# Patient Record
Sex: FEMALE | Race: WHITE | Employment: FULL TIME | URBAN - METROPOLITAN AREA
[De-identification: names, ages, dates, MRNs, and addresses within clinical notes are randomized per-mention and may not be internally consistent; named-entity substitution may affect disease eponyms.]

---

## 2017-01-16 ENCOUNTER — PATIENT MESSAGE (OUTPATIENT)
Dept: FAMILY MEDICINE CLINIC | Age: 32
End: 2017-01-16

## 2017-01-17 RX ORDER — ALBUTEROL SULFATE 90 UG/1
2 AEROSOL, METERED RESPIRATORY (INHALATION) EVERY 6 HOURS PRN
Qty: 1 INHALER | Refills: 1 | Status: SHIPPED | OUTPATIENT
Start: 2017-01-17 | End: 2018-07-26 | Stop reason: ALTCHOICE

## 2017-01-18 ENCOUNTER — OFFICE VISIT (OUTPATIENT)
Dept: FAMILY MEDICINE CLINIC | Age: 32
End: 2017-01-18

## 2017-01-18 VITALS
DIASTOLIC BLOOD PRESSURE: 84 MMHG | HEIGHT: 66 IN | TEMPERATURE: 98.1 F | SYSTOLIC BLOOD PRESSURE: 112 MMHG | HEART RATE: 124 BPM | BODY MASS INDEX: 38.41 KG/M2 | OXYGEN SATURATION: 94 % | WEIGHT: 239 LBS

## 2017-01-18 DIAGNOSIS — J06.9 URI, ACUTE: Primary | ICD-10-CM

## 2017-01-18 PROCEDURE — 99213 OFFICE O/P EST LOW 20 MIN: CPT | Performed by: NURSE PRACTITIONER

## 2017-01-18 RX ORDER — BROMPHENIRAMINE MALEATE, PSEUDOEPHEDRINE HYDROCHLORIDE, AND DEXTROMETHORPHAN HYDROBROMIDE 2; 30; 10 MG/5ML; MG/5ML; MG/5ML
SYRUP ORAL
COMMUNITY
Start: 2017-01-16 | End: 2017-11-13 | Stop reason: ALTCHOICE

## 2017-01-18 RX ORDER — METHYLPREDNISOLONE 4 MG/1
TABLET ORAL
COMMUNITY
Start: 2017-01-16 | End: 2017-01-18 | Stop reason: SINTOL

## 2017-01-18 RX ORDER — FLUCONAZOLE 150 MG/1
TABLET ORAL
Qty: 2 TABLET | Refills: 0 | Status: SHIPPED | OUTPATIENT
Start: 2017-01-18 | End: 2017-01-19

## 2017-01-18 RX ORDER — AMOXICILLIN AND CLAVULANATE POTASSIUM 875; 125 MG/1; MG/1
1 TABLET, FILM COATED ORAL 2 TIMES DAILY
Qty: 20 TABLET | Refills: 0 | Status: SHIPPED | OUTPATIENT
Start: 2017-01-18 | End: 2017-01-28

## 2017-01-18 ASSESSMENT — ENCOUNTER SYMPTOMS
COUGH: 1
DIARRHEA: 0
ALLERGIC/IMMUNOLOGIC NEGATIVE: 1
NAUSEA: 0
CHEST TIGHTNESS: 1
EYES NEGATIVE: 1
SORE THROAT: 0
ABDOMINAL PAIN: 0
WHEEZING: 0
SINUS PAIN: 1
SWOLLEN GLANDS: 0
SINUS PRESSURE: 1
VOMITING: 0
GASTROINTESTINAL NEGATIVE: 1
RHINORRHEA: 1

## 2017-02-05 DIAGNOSIS — F51.04 PSYCHOPHYSIOLOGICAL INSOMNIA: Primary | ICD-10-CM

## 2017-02-05 RX ORDER — TRAZODONE HYDROCHLORIDE 50 MG/1
50 TABLET ORAL NIGHTLY
Qty: 90 TABLET | Refills: 1 | Status: SHIPPED | OUTPATIENT
Start: 2017-02-05 | End: 2017-08-10 | Stop reason: SDUPTHER

## 2017-06-19 ENCOUNTER — PATIENT MESSAGE (OUTPATIENT)
Dept: FAMILY MEDICINE CLINIC | Age: 32
End: 2017-06-19

## 2017-06-19 DIAGNOSIS — H10.33 ACUTE BACTERIAL CONJUNCTIVITIS OF BOTH EYES: Primary | ICD-10-CM

## 2017-06-19 PROCEDURE — 99444 PR PHYSICIAN ONLINE EVALUATION & MANAGEMENT SERVICE: CPT | Performed by: FAMILY MEDICINE

## 2017-06-19 RX ORDER — CIPROFLOXACIN HYDROCHLORIDE 3.5 MG/ML
1 SOLUTION/ DROPS TOPICAL
Qty: 1 BOTTLE | Refills: 0 | Status: SHIPPED | OUTPATIENT
Start: 2017-06-19 | End: 2017-06-29

## 2017-06-19 RX ORDER — CIPROFLOXACIN HYDROCHLORIDE 3.5 MG/ML
SOLUTION/ DROPS TOPICAL
Qty: 5 ML | Refills: 0 | OUTPATIENT
Start: 2017-06-19

## 2017-08-10 DIAGNOSIS — F51.04 PSYCHOPHYSIOLOGICAL INSOMNIA: ICD-10-CM

## 2017-08-10 RX ORDER — TRAZODONE HYDROCHLORIDE 50 MG/1
50 TABLET ORAL NIGHTLY
Qty: 90 TABLET | Refills: 0 | Status: SHIPPED | OUTPATIENT
Start: 2017-08-10 | End: 2017-11-13 | Stop reason: ALTCHOICE

## 2017-11-13 ENCOUNTER — OFFICE VISIT (OUTPATIENT)
Dept: FAMILY MEDICINE CLINIC | Age: 32
End: 2017-11-13

## 2017-11-13 VITALS
HEIGHT: 66 IN | DIASTOLIC BLOOD PRESSURE: 60 MMHG | HEART RATE: 125 BPM | WEIGHT: 237.4 LBS | OXYGEN SATURATION: 96 % | SYSTOLIC BLOOD PRESSURE: 110 MMHG | BODY MASS INDEX: 38.15 KG/M2 | TEMPERATURE: 99.4 F

## 2017-11-13 DIAGNOSIS — J06.9 URI, ACUTE: Primary | ICD-10-CM

## 2017-11-13 RX ORDER — NORETHINDRONE 0.35 MG/1
TABLET ORAL
COMMUNITY
Start: 2017-10-24 | End: 2018-07-26 | Stop reason: ALTCHOICE

## 2017-11-13 RX ORDER — AMOXICILLIN 500 MG/1
500 CAPSULE ORAL 3 TIMES DAILY
Qty: 30 CAPSULE | Refills: 0 | Status: SHIPPED | OUTPATIENT
Start: 2017-11-13 | End: 2017-11-23

## 2017-11-13 ASSESSMENT — ENCOUNTER SYMPTOMS
VOMITING: 0
SWOLLEN GLANDS: 0
DIARRHEA: 0
SINUS PAIN: 1
CHEST TIGHTNESS: 1
ABDOMINAL PAIN: 0
ALLERGIC/IMMUNOLOGIC NEGATIVE: 1
COUGH: 1
EYES NEGATIVE: 1
SORE THROAT: 0
GASTROINTESTINAL NEGATIVE: 1
WHEEZING: 0
RHINORRHEA: 0
NAUSEA: 0

## 2017-11-13 NOTE — PROGRESS NOTES
11/13/17    Katharina Becerra  1985      Chief Complaint   Patient presents with    URI     chest congestion, cough x 5 days       Vitals:    11/13/17 1053   BP: 110/60   Pulse: 125   Temp: 99.4 °F (37.4 °C)   SpO2: 96%         Immunization History   Administered Date(s) Administered    HPV Gardasil Quadrivalent 09/02/2010    Influenza Vaccine, unspecified formulation 11/11/2016    MMR 08/03/2016    PPD Test 06/29/2011    Tdap (Boostrix, Adacel) 05/02/2006, 07/01/2011       Allergies   Allergen Reactions    Prednisone      flushing     Outpatient Prescriptions Marked as Taking for the 11/13/17 encounter (Office Visit) with Sandra Lua, NP   Medication Sig Dispense Refill    GABBIE 0.35 MG tablet       amoxicillin (AMOXIL) 500 MG capsule Take 1 capsule by mouth 3 times daily for 10 days 30 capsule 0    albuterol sulfate HFA (PROVENTIL HFA) 108 (90 BASE) MCG/ACT inhaler Inhale 2 puffs into the lungs every 6 hours as needed for Wheezing MAY SUBSTITUTE PER INSURANCE 1 Inhaler 1    fluticasone (FLONASE) 50 MCG/ACT nasal spray 2 sprays by Nasal route daily 3 Bottle 1    cetirizine (ZYRTEC) 10 MG tablet Take by mouth      Flaxseed, Linseed, 1000 MG CAPS Take  by mouth.  fish oil-omega-3 fatty acids 1000 MG capsule Take 2 g by mouth daily.  multivitamin (THERAGRAN) per tablet Take 1 tablet by mouth daily.  vitamin D (CHOLECALCIFEROL) 400 UNIT TABS tablet Take 2,000 Units by mouth daily.            Past Medical History:   Diagnosis Date    Allergic rhinitis     Goiter, non-toxic     Hypertension      Past Surgical History:   Procedure Laterality Date    WISDOM TOOTH EXTRACTION       Family History   Problem Relation Age of Onset    High Blood Pressure Mother     High Cholesterol Mother     High Blood Pressure Father     High Cholesterol Father     Heart Disease Father     High Cholesterol Brother     Heart Disease Paternal Aunt     Stroke Paternal Uncle     Heart Disease Negative. Physical Exam   Constitutional: She is oriented to person, place, and time. She appears well-developed and well-nourished. HENT:   Head: Normocephalic. Right Ear: External ear normal.   Left Ear: External ear normal.   Mouth/Throat: Oropharynx is clear and moist. No oropharyngeal exudate. Nasal congestion and facial pain     Eyes: Conjunctivae are normal. Pupils are equal, round, and reactive to light. Neck: Normal range of motion. Neck supple. Cardiovascular: Normal rate, regular rhythm, normal heart sounds and intact distal pulses. Pulmonary/Chest: Effort normal. No respiratory distress. She has decreased breath sounds. She has no wheezes. She has no rhonchi. She has no rales. Lymphadenopathy:     She has no cervical adenopathy. Neurological: She is alert and oriented to person, place, and time. Skin: Skin is warm and dry. Psychiatric: She has a normal mood and affect. Her behavior is normal. Judgment and thought content normal.         1. URI, acute  The condition is deteriorating, will change treatment, investigate cause and make further recommendations when data back. Continue current therapy, will monitor.    amoxicillin (AMOXIL) 500 MG capsule             Micheal Kaufman NP

## 2018-01-29 ENCOUNTER — TELEPHONE (OUTPATIENT)
Dept: FAMILY MEDICINE CLINIC | Age: 33
End: 2018-01-29

## 2018-01-29 NOTE — TELEPHONE ENCOUNTER
Son was seen by the [facility] today and tested negative for the flu but mom thinks he does have the flu  Mom is 7 months pregnant and wants to know what she can do or take to avoid getting the flu  Please advise  If applicable, please send medication

## 2018-06-26 ENCOUNTER — OFFICE VISIT (OUTPATIENT)
Dept: FAMILY MEDICINE CLINIC | Age: 33
End: 2018-06-26

## 2018-06-26 VITALS
BODY MASS INDEX: 32.95 KG/M2 | DIASTOLIC BLOOD PRESSURE: 78 MMHG | SYSTOLIC BLOOD PRESSURE: 102 MMHG | TEMPERATURE: 98.7 F | HEIGHT: 66 IN | OXYGEN SATURATION: 98 % | HEART RATE: 117 BPM | WEIGHT: 205 LBS

## 2018-06-26 DIAGNOSIS — E78.5 HYPERLIPIDEMIA, UNSPECIFIED HYPERLIPIDEMIA TYPE: ICD-10-CM

## 2018-06-26 DIAGNOSIS — E55.9 VITAMIN D DEFICIENCY: ICD-10-CM

## 2018-06-26 DIAGNOSIS — E04.9 NONTOXIC NODULAR GOITER: ICD-10-CM

## 2018-06-26 DIAGNOSIS — R03.0 ELEVATED BLOOD PRESSURE READING WITHOUT DIAGNOSIS OF HYPERTENSION: ICD-10-CM

## 2018-06-26 DIAGNOSIS — Z00.00 ROUTINE GENERAL MEDICAL EXAMINATION AT A HEALTH CARE FACILITY: ICD-10-CM

## 2018-06-26 DIAGNOSIS — K64.9 HEMORRHOIDS, UNSPECIFIED HEMORRHOID TYPE: Primary | ICD-10-CM

## 2018-06-26 DIAGNOSIS — L72.9 SCALP CYST: ICD-10-CM

## 2018-06-26 DIAGNOSIS — D22.9 NUMEROUS MOLES: ICD-10-CM

## 2018-06-26 PROCEDURE — 99214 OFFICE O/P EST MOD 30 MIN: CPT | Performed by: NURSE PRACTITIONER

## 2018-06-26 RX ORDER — DOCUSATE SODIUM 100 MG/1
100 CAPSULE, LIQUID FILLED ORAL 2 TIMES DAILY
COMMUNITY
End: 2018-09-28 | Stop reason: ALTCHOICE

## 2018-06-26 ASSESSMENT — PATIENT HEALTH QUESTIONNAIRE - PHQ9
SUM OF ALL RESPONSES TO PHQ9 QUESTIONS 1 & 2: 1
SUM OF ALL RESPONSES TO PHQ QUESTIONS 1-9: 1
2. FEELING DOWN, DEPRESSED OR HOPELESS: 0
1. LITTLE INTEREST OR PLEASURE IN DOING THINGS: 1

## 2018-06-26 ASSESSMENT — ENCOUNTER SYMPTOMS: CONSTIPATION: 1

## 2018-07-06 ENCOUNTER — OFFICE VISIT (OUTPATIENT)
Dept: SURGERY | Age: 33
End: 2018-07-06

## 2018-07-06 VITALS
TEMPERATURE: 97.9 F | HEIGHT: 66 IN | HEART RATE: 74 BPM | BODY MASS INDEX: 33.14 KG/M2 | SYSTOLIC BLOOD PRESSURE: 120 MMHG | DIASTOLIC BLOOD PRESSURE: 83 MMHG | WEIGHT: 206.2 LBS

## 2018-07-06 DIAGNOSIS — E04.9 NONTOXIC NODULAR GOITER: ICD-10-CM

## 2018-07-06 DIAGNOSIS — L72.11 PILAR CYST: Primary | ICD-10-CM

## 2018-07-06 DIAGNOSIS — E55.9 VITAMIN D DEFICIENCY: ICD-10-CM

## 2018-07-06 DIAGNOSIS — E78.5 HYPERLIPIDEMIA, UNSPECIFIED HYPERLIPIDEMIA TYPE: ICD-10-CM

## 2018-07-06 DIAGNOSIS — R03.0 ELEVATED BLOOD PRESSURE READING WITHOUT DIAGNOSIS OF HYPERTENSION: ICD-10-CM

## 2018-07-06 DIAGNOSIS — Z00.00 ROUTINE GENERAL MEDICAL EXAMINATION AT A HEALTH CARE FACILITY: ICD-10-CM

## 2018-07-06 LAB
A/G RATIO: 1.3 (ref 1.1–2.2)
ALBUMIN SERPL-MCNC: 4.4 G/DL (ref 3.4–5)
ALP BLD-CCNC: 102 U/L (ref 40–129)
ALT SERPL-CCNC: 20 U/L (ref 10–40)
ANION GAP SERPL CALCULATED.3IONS-SCNC: 13 MMOL/L (ref 3–16)
AST SERPL-CCNC: 16 U/L (ref 15–37)
BASOPHILS ABSOLUTE: 0 K/UL (ref 0–0.2)
BASOPHILS RELATIVE PERCENT: 0.5 %
BILIRUB SERPL-MCNC: 0.5 MG/DL (ref 0–1)
BUN BLDV-MCNC: 12 MG/DL (ref 7–20)
CALCIUM SERPL-MCNC: 10.7 MG/DL (ref 8.3–10.6)
CHLORIDE BLD-SCNC: 100 MMOL/L (ref 99–110)
CHOLESTEROL, TOTAL: 231 MG/DL (ref 0–199)
CO2: 28 MMOL/L (ref 21–32)
CREAT SERPL-MCNC: 0.6 MG/DL (ref 0.6–1.1)
EOSINOPHILS ABSOLUTE: 0.4 K/UL (ref 0–0.6)
EOSINOPHILS RELATIVE PERCENT: 6.3 %
GFR AFRICAN AMERICAN: >60
GFR NON-AFRICAN AMERICAN: >60
GLOBULIN: 3.3 G/DL
GLUCOSE BLD-MCNC: 85 MG/DL (ref 70–99)
HCT VFR BLD CALC: 41.3 % (ref 36–48)
HDLC SERPL-MCNC: 77 MG/DL (ref 40–60)
HEMOGLOBIN: 14.2 G/DL (ref 12–16)
LDL CHOLESTEROL CALCULATED: 137 MG/DL
LYMPHOCYTES ABSOLUTE: 2.3 K/UL (ref 1–5.1)
LYMPHOCYTES RELATIVE PERCENT: 36.4 %
MCH RBC QN AUTO: 28.8 PG (ref 26–34)
MCHC RBC AUTO-ENTMCNC: 34.4 G/DL (ref 31–36)
MCV RBC AUTO: 83.9 FL (ref 80–100)
MONOCYTES ABSOLUTE: 0.4 K/UL (ref 0–1.3)
MONOCYTES RELATIVE PERCENT: 7.1 %
NEUTROPHILS ABSOLUTE: 3.1 K/UL (ref 1.7–7.7)
NEUTROPHILS RELATIVE PERCENT: 49.7 %
PDW BLD-RTO: 12.6 % (ref 12.4–15.4)
PLATELET # BLD: 298 K/UL (ref 135–450)
PMV BLD AUTO: 7.4 FL (ref 5–10.5)
POTASSIUM SERPL-SCNC: 4.9 MMOL/L (ref 3.5–5.1)
RBC # BLD: 4.92 M/UL (ref 4–5.2)
SODIUM BLD-SCNC: 141 MMOL/L (ref 136–145)
T4 FREE: 2.2 NG/DL (ref 0.9–1.8)
TOTAL PROTEIN: 7.7 G/DL (ref 6.4–8.2)
TRIGL SERPL-MCNC: 83 MG/DL (ref 0–150)
TSH SERPL DL<=0.05 MIU/L-ACNC: <0.01 UIU/ML (ref 0.27–4.2)
VITAMIN D 25-HYDROXY: 35.3 NG/ML
VLDLC SERPL CALC-MCNC: 17 MG/DL
WBC # BLD: 6.2 K/UL (ref 4–11)

## 2018-07-06 PROCEDURE — 99202 OFFICE O/P NEW SF 15 MIN: CPT | Performed by: SURGERY

## 2018-07-06 NOTE — LETTER
[employer] of [address] [place] [address] [employer]  [address] [facility]  Phone: [phone]  Fax: [phone]    Issa Galan MD        July 12, 2018     Irena Erazo MD  [address] [address]    Patient: Julio César Schneider  MR Number: [id number]  YOB: 1985  Date of Visit: 7/6/2018    Dear Dr. Jaky Daniel [facility]:    Thank you for the request for consultation for Mahin Richter to me for the evaluation of a pilar cyst. Below are the relevant portions of my assessment and plan of care. The patient has a pilar cyst of the scalp. She wishes to have this removed and as a result we will proceed under straight local in the office. If you have questions, please do not hesitate to call me. I look forward to following Ana Snow along with you.     Sincerely,        Issa Galan MD

## 2018-07-11 ENCOUNTER — PATIENT MESSAGE (OUTPATIENT)
Dept: FAMILY MEDICINE CLINIC | Age: 33
End: 2018-07-11

## 2018-07-12 ENCOUNTER — TELEPHONE (OUTPATIENT)
Dept: SURGERY | Age: 33
End: 2018-07-12

## 2018-07-12 NOTE — PROGRESS NOTES
PATIENT NAME: Richelle Giang     YOB: 1985     TODAY'S DATE: 7/12/2018    Reason for Visit:  Jesse Britt cyst    Requesting Physician:  Dr. Jojo Ramirez:              The patient is a 26 y.o. female who presents with a cyst of the scalp that has been present for some time. This has gradually increased in size and associated with slight tenderness. Chief Complaint   Patient presents with    Cyst     scalp cyst-referred by Dr Chantel Florence:  CONSTITUTIONAL:  negative  HEENT:  negative  RESPIRATORY:  negative  CARDIOVASCULAR:  negative  GASTROINTESTINAL:  negative  GENITOURINARY:  negative  HEMATOLOGIC/LYMPHATIC:  negative  NEUROLOGICAL:  negative    PHYSICAL EXAM:  VITALS:  /83 (Site: Left Arm)   Pulse 74   Temp 97.9 °F (36.6 °C) (Oral)   Ht 5' 6\" (1.676 m)   Wt 206 lb 3.2 oz (93.5 kg)   LMP 06/30/2017   BMI 33.28 kg/m²     CONSTITUTIONAL:  alert, no apparent distress and mildly obese  EYES:  sclera clear  ENT:  normocepalic, without obvious abnormality  NECK:  supple, symmetrical, trachea midline and no carotid bruits  LUNGS:  clear to auscultation  CARDIOVASCULAR:  regular rate and rhythm and no murmur noted  ABDOMEN:  no scars, normal bowel sounds, soft, non-distended, non-tender, voluntary guarding absent, no masses palpated and hernia absent  MUSCULOSKELETAL:  0+ pitting edema lower extremities  NEUROLOGIC:  Mental Status Exam:  Level of Alertness:   awake  Orientation:   person, place, time  SKIN:  1 cm pilar cyst without evidence of infection    IMPRESSION/RECOMMENDATIONS:    The patient has a pilar cyst of the scalp. She wishes to have this removed and as a result we will proceed under straight local in the office. The risks and benefits of surgery were discussed with the patient and she wishes to proceed.     Romario Nazario

## 2018-07-18 ENCOUNTER — PROCEDURE VISIT (OUTPATIENT)
Dept: SURGERY | Age: 33
End: 2018-07-18

## 2018-07-18 VITALS
WEIGHT: 207 LBS | HEIGHT: 66 IN | TEMPERATURE: 98 F | BODY MASS INDEX: 33.27 KG/M2 | SYSTOLIC BLOOD PRESSURE: 106 MMHG | DIASTOLIC BLOOD PRESSURE: 74 MMHG

## 2018-07-18 DIAGNOSIS — L72.11 PILAR CYST: Primary | ICD-10-CM

## 2018-07-18 PROCEDURE — 11421 EXC H-F-NK-SP B9+MARG 0.6-1: CPT | Performed by: SURGERY

## 2018-07-18 PROCEDURE — 11422 EXC H-F-NK-SP B9+MARG 1.1-2: CPT | Performed by: SURGERY

## 2018-07-23 ENCOUNTER — OFFICE VISIT (OUTPATIENT)
Dept: SURGERY | Age: 33
End: 2018-07-23

## 2018-07-23 VITALS — WEIGHT: 207 LBS | TEMPERATURE: 98.1 F | HEIGHT: 66 IN | BODY MASS INDEX: 33.27 KG/M2

## 2018-07-23 DIAGNOSIS — L72.11 PILAR CYST: Primary | ICD-10-CM

## 2018-07-23 PROCEDURE — 99024 POSTOP FOLLOW-UP VISIT: CPT | Performed by: SURGERY

## 2018-07-24 PROBLEM — O24.410 GDM (GESTATIONAL DIABETES MELLITUS), CLASS A1: Status: ACTIVE | Noted: 2018-01-10

## 2018-07-24 PROBLEM — Z34.90 PREGNANCY: Status: ACTIVE | Noted: 2018-03-26

## 2018-07-26 ENCOUNTER — OFFICE VISIT (OUTPATIENT)
Dept: FAMILY MEDICINE CLINIC | Age: 33
End: 2018-07-26

## 2018-07-26 VITALS
HEART RATE: 74 BPM | DIASTOLIC BLOOD PRESSURE: 70 MMHG | HEIGHT: 66 IN | RESPIRATION RATE: 14 BRPM | OXYGEN SATURATION: 96 % | BODY MASS INDEX: 33.75 KG/M2 | SYSTOLIC BLOOD PRESSURE: 104 MMHG | WEIGHT: 210 LBS

## 2018-07-26 DIAGNOSIS — E04.9 NONTOXIC NODULAR GOITER: Primary | ICD-10-CM

## 2018-07-26 PROCEDURE — 99213 OFFICE O/P EST LOW 20 MIN: CPT | Performed by: FAMILY MEDICINE

## 2018-07-26 NOTE — PROGRESS NOTES
Final    Total Protein 07/06/2018 7.7  6.4 - 8.2 g/dL Final    Alb 07/06/2018 4.4  3.4 - 5.0 g/dL Final    Albumin/Globulin Ratio 07/06/2018 1.3  1.1 - 2.2 Final    Total Bilirubin 07/06/2018 0.5  0.0 - 1.0 mg/dL Final    Alkaline Phosphatase 07/06/2018 102  40 - 129 U/L Final    ALT 07/06/2018 20  10 - 40 U/L Final    AST 07/06/2018 16  15 - 37 U/L Final    Globulin 07/06/2018 3.3  g/dL Final    WBC 07/06/2018 6.2  4.0 - 11.0 K/uL Final    RBC 07/06/2018 4.92  4.00 - 5.20 M/uL Final    Hemoglobin 07/06/2018 14.2  12.0 - 16.0 g/dL Final    Hematocrit 07/06/2018 41.3  36.0 - 48.0 % Final    MCV 07/06/2018 83.9  80.0 - 100.0 fL Final    MCH 07/06/2018 28.8  26.0 - 34.0 pg Final    MCHC 07/06/2018 34.4  31.0 - 36.0 g/dL Final    RDW 07/06/2018 12.6  12.4 - 15.4 % Final    Platelets 01/96/7294 298  135 - 450 K/uL Final    MPV 07/06/2018 7.4  5.0 - 10.5 fL Final    Neutrophils % 07/06/2018 49.7  % Final    Lymphocytes % 07/06/2018 36.4  % Final    Monocytes % 07/06/2018 7.1  % Final    Eosinophils % 07/06/2018 6.3  % Final    Basophils % 07/06/2018 0.5  % Final    Neutrophils # 07/06/2018 3.1  1.7 - 7.7 K/uL Final    Lymphocytes # 07/06/2018 2.3  1.0 - 5.1 K/uL Final    Monocytes # 07/06/2018 0.4  0.0 - 1.3 K/uL Final    Eosinophils # 07/06/2018 0.4  0.0 - 0.6 K/uL Final    Basophils # 07/06/2018 0.0  0.0 - 0.2 K/uL Final    Cholesterol, Total 07/06/2018 231* 0 - 199 mg/dL Final    Triglycerides 07/06/2018 83  0 - 150 mg/dL Final    HDL 07/06/2018 77* 40 - 60 mg/dL Final    LDL Calculated 07/06/2018 137* <100 mg/dL Final    VLDL Cholesterol Calculated 07/06/2018 17  Not Established mg/dL Final    TSH 07/06/2018 <0.01* 0.27 - 4.20 uIU/mL Final    Vit D, 25-Hydroxy 07/06/2018 35.3  >=30 ng/mL Final    Comment: <=20 ng/mL. ........... Clerance Riis Deficient  21-29 ng/mL. ......... Clerance Riis Insufficient  >=30 ng/mL. ........ Clerance Riis Sufficient      T4 Free 07/06/2018 2.2* 0.9 - 1.8 ng/dL Final       Review of Systems

## 2018-07-31 NOTE — PROGRESS NOTES
PATIENT NAME: Amna Barone     YOB: 1985     TODAY'S DATE: 7/31/2018    Reason for Visit:  Post-op check    Requesting Physician:  Dr. Raguel Felty:              The patient is a 26 y.o. female who presents for follow up without complaints. Chief Complaint   Patient presents with    Post-Op Check     Excision of pilar cyst of scalp in office 7/18/18       REVIEW OF SYSTEMS:  CONSTITUTIONAL:  negative  HEENT:  negative  RESPIRATORY:  negative  CARDIOVASCULAR:  negative  GASTROINTESTINAL:  negative  GENITOURINARY:  negative  HEMATOLOGIC/LYMPHATIC:  negative  NEUROLOGICAL:  negative    PHYSICAL EXAM:  VITALS:  Temp 98.1 °F (36.7 °C)   Ht 5' 6\" (1.676 m)   Wt 207 lb (93.9 kg)   LMP 06/30/2017   BMI 33.41 kg/m²     CONSTITUTIONAL:  alert, no apparent distress and mildly obese  EYES:  sclera clear  ENT:  normocepalic, without obvious abnormality  NECK:  supple, symmetrical, trachea midline and no carotid bruits  LUNGS:  clear to auscultation  CARDIOVASCULAR:  regular rate and rhythm and no murmur noted  ABDOMEN:  no scars, normal bowel sounds, soft, non-distended, non-tender, voluntary guarding absent, no masses palpated and hernia absent  MUSCULOSKELETAL:  0+ pitting edema lower extremities  NEUROLOGIC:  Mental Status Exam:  Level of Alertness:   awake  Orientation:   person, place, time  SKIN:  Incision healing well and the sutures were removed    IMPRESSION/RECOMMENDATIONS:    The patient is s/p excision of a pilar cyst of the scalp. The patient is recovered well from surgery and his incision is healing well. I will see her back in the office on a PRN basis.      Romario Nazario

## 2018-08-20 ENCOUNTER — OFFICE VISIT (OUTPATIENT)
Dept: ENDOCRINOLOGY | Age: 33
End: 2018-08-20

## 2018-08-20 VITALS
HEIGHT: 66 IN | DIASTOLIC BLOOD PRESSURE: 78 MMHG | BODY MASS INDEX: 34.36 KG/M2 | WEIGHT: 213.8 LBS | HEART RATE: 84 BPM | SYSTOLIC BLOOD PRESSURE: 118 MMHG | OXYGEN SATURATION: 97 %

## 2018-08-20 DIAGNOSIS — E66.9 CLASS 1 OBESITY WITH BODY MASS INDEX (BMI) OF 34.0 TO 34.9 IN ADULT, UNSPECIFIED OBESITY TYPE, UNSPECIFIED WHETHER SERIOUS COMORBIDITY PRESENT: ICD-10-CM

## 2018-08-20 DIAGNOSIS — E05.90 HYPERTHYROIDISM: ICD-10-CM

## 2018-08-20 DIAGNOSIS — E04.2 MULTINODULAR GOITER: Primary | ICD-10-CM

## 2018-08-20 PROBLEM — E66.811 CLASS 1 OBESITY IN ADULT: Status: ACTIVE | Noted: 2018-08-20

## 2018-08-20 PROBLEM — Z34.90 PREGNANCY: Status: RESOLVED | Noted: 2018-03-26 | Resolved: 2018-08-20

## 2018-08-20 PROCEDURE — 99204 OFFICE O/P NEW MOD 45 MIN: CPT | Performed by: INTERNAL MEDICINE

## 2018-08-20 ASSESSMENT — PATIENT HEALTH QUESTIONNAIRE - PHQ9
2. FEELING DOWN, DEPRESSED OR HOPELESS: 0
SUM OF ALL RESPONSES TO PHQ QUESTIONS 1-9: 0
1. LITTLE INTEREST OR PLEASURE IN DOING THINGS: 0
SUM OF ALL RESPONSES TO PHQ QUESTIONS 1-9: 0
SUM OF ALL RESPONSES TO PHQ9 QUESTIONS 1 & 2: 0

## 2018-08-20 NOTE — PROGRESS NOTES
SUBJECTIVE:  Ferd Lombard is a 26 y.o. female who is here for a hyperthyroidism, multinodular goiter. 1. Multinodular goiter    This started in 2001. Patient was diagnosed with MNG. The problem has been unchanged. Previous thyroid studies include: TSH and free thyroxine. Current complaints: weight gain, anxiety, insomnia, fatigue. Had a lot of stress, father, brother passed away recently. In 2001 she was diagnosed with 1.5 cm cystic nodule. Repeated US - right 1.4 cm, right superior 1.7 cm, left subcm nodules. History of obstructive symptoms: difficulty swallowing No, changes in voice/hoarseness No.    History of radiation to patient's neck: No  Resent iodine exposure: No  Family history includes thyroid abnormalities. Family history of thyroid cancer: No    2. Hyperthyroidism    No diarrhea. Hair loss postpartum. Calcium 10.6, ULN 10.5. Had son born 3/28/2018. Has another boy 5yo  Has breastfeeding. Has Mary implant for birth control. Had short periods since 3/2018. 3. Class 1 obesity with body mass index (BMI) of 34.0 to 34.9 in adult, unspecified obesity type, unspecified whether serious comorbidity present  Gained 10 lbs recently. Eats moderately healthy.           Past Medical History:   Diagnosis Date    Allergic rhinitis     Class 1 obesity in adult 8/20/2018    Goiter, non-toxic     Hypertension     Hyperthyroidism 8/20/2018     Patient Active Problem List    Diagnosis Date Noted    Hyperthyroidism 08/20/2018    Class 1 obesity in adult 08/20/2018    GDM (gestational diabetes mellitus), class A1 01/10/2018    Psychophysiological insomnia 02/05/2017    Allergic rhinitis 07/20/2016    Routine general medical examination at a health care facility 05/02/2011    Multinodular goiter 05/02/2011    Hyperlipemia 05/02/2011    Vitamin D deficiency 05/02/2011    Anxiety 05/02/2011    Elevated blood pressure reading without diagnosis of hypertension 05/02/2011     Past Surgical History:   Procedure Laterality Date    CYST REMOVAL  2018    Scalp    WISDOM TOOTH EXTRACTION       Family History   Problem Relation Age of Onset    High Blood Pressure Mother     High Cholesterol Mother     High Blood Pressure Father     High Cholesterol Father     Heart Disease Father     High Cholesterol Brother     Heart Disease Paternal Aunt     Stroke Paternal Uncle     Heart Disease Paternal Uncle     Stroke Maternal Grandmother     Arthritis Paternal Grandmother     Heart Disease Paternal Grandfather      Social History     Social History    Marital status:      Spouse name: N/A    Number of children: N/A    Years of education: N/A     Social History Main Topics    Smoking status: Never Smoker    Smokeless tobacco: Never Used    Alcohol use Yes      Comment: 1-2 per month on average    Drug use: No    Sexual activity: Yes     Other Topics Concern    None     Social History Narrative    None     Current Outpatient Prescriptions   Medication Sig Dispense Refill    docusate sodium (COLACE) 100 MG capsule Take 100 mg by mouth 2 times daily      fluticasone (FLONASE) 50 MCG/ACT nasal spray 2 sprays by Nasal route daily 3 Bottle 1    cetirizine (ZYRTEC) 10 MG tablet Take by mouth      Flaxseed, Linseed, 1000 MG CAPS Take  by mouth.  fish oil-omega-3 fatty acids 1000 MG capsule Take 1,600 mg by mouth daily       multivitamin (THERAGRAN) per tablet Take 1 tablet by mouth daily.  Cholecalciferol (VITAMIN D) 2000 units CAPS capsule Take 2,000 Units by mouth daily. No current facility-administered medications for this visit.       Allergies   Allergen Reactions    Prednisone      flushing     Family Status   Relation Status    Mother Alive    Father     Brother Alive    PAunt Alive        one  from sudden death   [facility]     MGM     [address] [place] [address] Alive    [facility]     MGF Alive        thyroid, gout    Brother 07/06/2018    GFRAA 98 10/22/2011    AGRATIO 1.3 07/06/2018    GLOB 3.3 07/06/2018     Lab Results   Component Value Date    TSH <0.01 07/06/2018     No results found for: LABA1C  No results found for: EAG  Lab Results   Component Value Date    CHOL 231 07/06/2018     Lab Results   Component Value Date    TRIG 83 07/06/2018     Lab Results   Component Value Date    HDL 77 07/06/2018    HDL 51 03/14/2012     Lab Results   Component Value Date    LDLCALC 137 07/06/2018     Lab Results   Component Value Date    LABVLDL 17 07/06/2018     No results found for: CHOLHDLRATIO  No results found for: Jarret Sherwood  Lab Results   Component Value Date    VITD25 35.3 07/06/2018        ASSESSMENT/PLAN:  1. Hyperthyroidism    - Comprehensive Metabolic Panel; Future  - T3; Future  - T3, Free; Future  - T4; Future  - T4, Free; Future  - TSH without Reflex; Future  - Thyroid Stimulating Immunoglobulin; Future  - Thyroid Peroxidase Antibody; Future  - Anti-Thyroglobulin Antibody; Future  - Thyrotropin receptor antibody; Future  - CBC Auto Differential; Future    2. Class 1 obesity with body mass index (BMI) of 34.0 to 34.9 in adult, unspecified obesity type, unspecified whether serious comorbidity present  Diet, exercise. 3. Multinodular goiter  Follow sonogram with Dr. Vickey Springer. Reviewed and/or ordered clinical lab results Yes  Reviewed and/or ordered radiology tests Yes   Reviewed and/or ordered other diagnostic tests No  Discussed test results with performing physician No  Independently reviewed image, tracing, or specimen No  Made a decision to obtain old records No  Reviewed and summarized old records Yes  In 2001 she was diagnosed with 1.5 cm cystic nodule. Repeated US - right 1.4 cm, right superior 1.7 cm, left subcm nodules.   TSH 7/21/2018 <0.01  TSH 7/2016 2.36  Obtained history from other than patient No    Janet Reese was counseled regarding symptoms of thyroid diagnosis, course and complications of disease if inadequately treated, side effects of medications, diagnosis, treatment options, and prognosis, risks, benefits, complications, and alternatives of treatment, labs, imaging and other studies and treatment targets and goals, thyorid nodules, hyperthyroidism causes treatment. .  She understands instructions and counseling. Total visit time 45 min, >50% was counseling time. Return in about 1 month (around 9/20/2018) for thyroid problems.

## 2018-08-24 DIAGNOSIS — E05.90 HYPERTHYROIDISM: ICD-10-CM

## 2018-08-25 LAB
A/G RATIO: 1.7 (ref 1.1–2.2)
ALBUMIN SERPL-MCNC: 4.7 G/DL (ref 3.4–5)
ALP BLD-CCNC: 91 U/L (ref 40–129)
ALT SERPL-CCNC: 17 U/L (ref 10–40)
ANION GAP SERPL CALCULATED.3IONS-SCNC: 14 MMOL/L (ref 3–16)
ANTI-THYROGLOB ABS: 187 IU/ML
AST SERPL-CCNC: 18 U/L (ref 15–37)
BASOPHILS ABSOLUTE: 0.1 K/UL (ref 0–0.2)
BASOPHILS RELATIVE PERCENT: 1.3 %
BILIRUB SERPL-MCNC: 0.4 MG/DL (ref 0–1)
BUN BLDV-MCNC: 11 MG/DL (ref 7–20)
CALCIUM SERPL-MCNC: 10 MG/DL (ref 8.3–10.6)
CHLORIDE BLD-SCNC: 101 MMOL/L (ref 99–110)
CO2: 27 MMOL/L (ref 21–32)
CREAT SERPL-MCNC: 0.7 MG/DL (ref 0.6–1.1)
EOSINOPHILS ABSOLUTE: 0.3 K/UL (ref 0–0.6)
EOSINOPHILS RELATIVE PERCENT: 5.2 %
GFR AFRICAN AMERICAN: >60
GFR NON-AFRICAN AMERICAN: >60
GLOBULIN: 2.8 G/DL
GLUCOSE BLD-MCNC: 77 MG/DL (ref 70–99)
HCT VFR BLD CALC: 42.6 % (ref 36–48)
HEMOGLOBIN: 14 G/DL (ref 12–16)
LYMPHOCYTES ABSOLUTE: 2.3 K/UL (ref 1–5.1)
LYMPHOCYTES RELATIVE PERCENT: 40.4 %
MCH RBC QN AUTO: 28.7 PG (ref 26–34)
MCHC RBC AUTO-ENTMCNC: 32.8 G/DL (ref 31–36)
MCV RBC AUTO: 87.7 FL (ref 80–100)
MONOCYTES ABSOLUTE: 0.3 K/UL (ref 0–1.3)
MONOCYTES RELATIVE PERCENT: 5.2 %
NEUTROPHILS ABSOLUTE: 2.8 K/UL (ref 1.7–7.7)
NEUTROPHILS RELATIVE PERCENT: 47.9 %
PDW BLD-RTO: 14.1 % (ref 12.4–15.4)
PLATELET # BLD: 273 K/UL (ref 135–450)
PMV BLD AUTO: 7.1 FL (ref 5–10.5)
POTASSIUM SERPL-SCNC: 4.6 MMOL/L (ref 3.5–5.1)
RBC # BLD: 4.86 M/UL (ref 4–5.2)
SODIUM BLD-SCNC: 142 MMOL/L (ref 136–145)
T3 FREE: 2.6 PG/ML (ref 2.3–4.2)
T3 TOTAL: 1.04 NG/ML (ref 0.8–2)
T4 FREE: 0.8 NG/DL (ref 0.9–1.8)
T4 TOTAL: 5.5 UG/DL (ref 4.5–10.9)
THYROID PEROXIDASE (TPO) ABS: 568 IU/ML
TOTAL PROTEIN: 7.5 G/DL (ref 6.4–8.2)
TSH SERPL DL<=0.05 MIU/L-ACNC: 6.12 UIU/ML (ref 0.27–4.2)
WBC # BLD: 5.8 K/UL (ref 4–11)

## 2018-08-26 ENCOUNTER — TELEPHONE (OUTPATIENT)
Dept: ENDOCRINOLOGY | Age: 33
End: 2018-08-26

## 2018-08-26 PROBLEM — E06.9 THYROIDITIS: Status: ACTIVE | Noted: 2018-08-26

## 2018-08-26 PROBLEM — E06.3 HASHIMOTO'S THYROIDITIS: Status: ACTIVE | Noted: 2018-08-26

## 2018-08-26 PROBLEM — E03.9 ACQUIRED HYPOTHYROIDISM: Status: ACTIVE | Noted: 2018-08-26

## 2018-08-27 LAB — TSH RECEPTOR AB: <0.9 IU/L

## 2018-08-29 LAB — THYROID STIMULATING IMMUNOGLOBULIN: 94 %

## 2018-09-06 ENCOUNTER — OFFICE VISIT (OUTPATIENT)
Dept: ENDOCRINOLOGY | Age: 33
End: 2018-09-06

## 2018-09-06 VITALS
SYSTOLIC BLOOD PRESSURE: 100 MMHG | HEART RATE: 90 BPM | DIASTOLIC BLOOD PRESSURE: 68 MMHG | BODY MASS INDEX: 33.52 KG/M2 | HEIGHT: 66 IN | OXYGEN SATURATION: 98 % | WEIGHT: 208.6 LBS

## 2018-09-06 DIAGNOSIS — E04.2 MULTINODULAR GOITER: Primary | ICD-10-CM

## 2018-09-06 DIAGNOSIS — E06.3 HASHIMOTO'S THYROIDITIS: ICD-10-CM

## 2018-09-06 DIAGNOSIS — E03.9 ACQUIRED HYPOTHYROIDISM: ICD-10-CM

## 2018-09-06 DIAGNOSIS — E66.9 CLASS 1 OBESITY WITH BODY MASS INDEX (BMI) OF 33.0 TO 33.9 IN ADULT, UNSPECIFIED OBESITY TYPE, UNSPECIFIED WHETHER SERIOUS COMORBIDITY PRESENT: ICD-10-CM

## 2018-09-06 DIAGNOSIS — E55.9 VITAMIN D DEFICIENCY: ICD-10-CM

## 2018-09-06 DIAGNOSIS — Z86.39 HISTORY OF HYPERTHYROIDISM: ICD-10-CM

## 2018-09-06 PROCEDURE — 99213 OFFICE O/P EST LOW 20 MIN: CPT | Performed by: INTERNAL MEDICINE

## 2018-09-06 ASSESSMENT — PATIENT HEALTH QUESTIONNAIRE - PHQ9
SUM OF ALL RESPONSES TO PHQ9 QUESTIONS 1 & 2: 0
1. LITTLE INTEREST OR PLEASURE IN DOING THINGS: 0
SUM OF ALL RESPONSES TO PHQ QUESTIONS 1-9: 0
2. FEELING DOWN, DEPRESSED OR HOPELESS: 0
SUM OF ALL RESPONSES TO PHQ QUESTIONS 1-9: 0

## 2018-09-06 NOTE — PROGRESS NOTES
SUBJECTIVE:  Ruy Koo is a 26 y.o. female who is here for a hyperthyroidism, multinodular goiter. 1. Multinodular goiter    This started in . Patient was diagnosed with MNG. The problem has been unchanged. Previous thyroid studies include: TSH and free thyroxine. Current complaints: weight gain, anxiety, insomnia, fatigue. Had a lot of stress, father, brother passed away recently. In  she was diagnosed with 1.5 cm cystic nodule. Repeated US - right 1.4 cm, right superior 1.7 cm, left subcm nodules. History of obstructive symptoms: difficulty swallowing No, changes in voice/hoarseness No.    History of radiation to patient's neck: No  Resent iodine exposure: No  Family history includes thyroid abnormalities. Family history of thyroid cancer: No    2. History of Hyperthyroidism    No diarrhea. Hair loss postpartum. Calcium 10.6, ULN 10.5. Had son born 3/28/2018. Has another boy 3yo  Has breastfeeding. Has Mary implant for birth control. Had short periods since 3/2018. 3. Class 1 obesity with body mass index (BMI) of 33.0 to 33.9 in adult, unspecified obesity type, unspecified whether serious comorbidity present  Gained 10 lbs recently. Eats moderately healthy. 4. Hypothyroidism  New diagnosis. 5. Hashimoto's thyroiditis  Has fatigue. 6. Postpartum thyroiditis    Name: Gabriel Garcia   :  1985   [address] [place] [address]. Staff: Sonia Briseno [id number]    Verified By: Rahat SANZ         Samm: 09  11:51 am  Exams:  US-THYROID/NECK/HEAD      Thyroid ultrasound on 2009    History: Thyroid nodule    Comparison: None    Findings: The right lobe of the gland measures greater than 5 cm in  craniocaudal dimension. It extends beyond the confines of the  transducer. Transversely, it measures 2.4 x 2.5 cm. The left  lobe measures 4.8 x 2.4 x 1.6 cm.  The thyroid demonstrates  diffusely heterogeneous echogenicity. There is coarsening of the  echotexture. Within the right lobe, there is a 1.3 x 0.7 x 0.9  cm cyst. There is also a hypoechoic nodule measuring 1.2 x 0.9 x  0.7 cm. The remainder of the gland is diffusely heterogeneous  and may contain additional small hypoechoic nodules. Impression:    Diffusely heterogeneous thyroid gland. There is a discrete cyst  and a discrete nodule seen within the right lobe as described  above.   * end of result *   Status         Past Medical History:   Diagnosis Date    Acquired hypothyroidism 8/26/2018    Allergic rhinitis     Class 1 obesity in adult 8/20/2018    Goiter, non-toxic     Hypertension     Hyperthyroidism 8/20/2018     Patient Active Problem List    Diagnosis Date Noted    Postpartum thyroiditis 08/26/2018    Hashimoto's thyroiditis 08/26/2018    Acquired hypothyroidism 08/26/2018    Hyperthyroidism 08/20/2018    Class 1 obesity in adult 08/20/2018    GDM (gestational diabetes mellitus), class A1 01/10/2018    Psychophysiological insomnia 02/05/2017    Allergic rhinitis 07/20/2016    Routine general medical examination at a health care facility 05/02/2011    Multinodular goiter 05/02/2011    Hyperlipemia 05/02/2011    Vitamin D deficiency 05/02/2011    Anxiety 05/02/2011    Elevated blood pressure reading without diagnosis of hypertension 05/02/2011     Past Surgical History:   Procedure Laterality Date    CYST REMOVAL  07/18/2018    Scalp    WISDOM TOOTH EXTRACTION       Family History   Problem Relation Age of Onset    High Blood Pressure Mother     High Cholesterol Mother     High Blood Pressure Father     High Cholesterol Father     Heart Disease Father     High Cholesterol Brother     Heart Disease Paternal Aunt     Stroke Paternal Uncle     Heart Disease Paternal Uncle     Stroke Maternal Grandmother     Arthritis Paternal Grandmother     Heart Disease Paternal Grandfather      Social History     Social History    no lower extremity edema, no orthopnea, no intermittent leg claudication, no palpitations  Gastrointestinal: no abdominal pain, no nausea, no vomiting, no diarrhea, no constipation, no dysphagia, no heartburn, no bloating  Genitourinary: no dysuria, no urinary incontinence, no urinary hesitancy, no urinary frequency, no feelings of urinary urgency, no nocturia  Musculoskeletal: no joint swelling, no joint stiffness, no joint pain, no muscle cramps, no muscle pain, no bone pain  Integument/Breast: no hair loss, no skin rashes, no skin lesions, no itching, no dry skin  Neurological: no numbness, no tingling, no weakness, no confusion, no headaches, no dizziness, no fainting, no tremors, no decrease in memory, no balance problems  Psychiatric: has anxiety, no depression, has insomnia  Hematologic/Lymphatic: no tendency for easy bleeding, no swollen lymph nodes, no tendency for easy bruising  Immunology: has seasonal allergies, no frequent infections, no frequent illnesses  Endocrine: no temperature intolerance    /68 (Site: Left Arm, Position: Sitting, Cuff Size: Large Adult)   Pulse 90   Ht 5' 6\" (1.676 m)   Wt 208 lb 9.6 oz (94.6 kg)   SpO2 98%   BMI 33.67 kg/m²    Wt Readings from Last 3 Encounters:   09/06/18 208 lb 9.6 oz (94.6 kg)   08/20/18 213 lb 12.8 oz (97 kg)   07/26/18 210 lb (95.3 kg)     Body mass index is 33.67 kg/m².     OBJECTIVE:  Constitutional: no acute distress, well appearing and well nourished  Psychiatric: oriented to person, place and time, judgement and insight and normal, recent and remote memory and intact and mood and affect are normal  Skin: skin and subcutaneous tissue is normal without mass, normal turgor  Head and Face: examination of head and face revealed no abnormalities  Eyes: no lid or conjunctival swelling, erythema or discharge, pupils are normal, equal, round, reactive to light  Ears/Nose: external inspection of ears and nose revealed no abnormalities, hearing is grossly normal  Oropharynx/Mouth/Face: lips, tongue and gums are normal with no lesions, the voice quality was normal  Neck: neck is supple and symmetric, with midline trachea and no masses, thyroid is enlarged  Lymphatics: normal cervical lymph nodes, normal supraclavicular nodes  Pulmonary: no increased work of breathing or signs of respiratory distress, lungs are clear to auscultation  Cardiovascular: normal heart rate and rhythm, normal S1 and S2, no murmurs and pedal pulses and 2+ bilaterally, No edema  Abdomen: abdomen is soft, non-tender with no masses  Musculoskeletal: normal gait and station and exam of the digits and nails are normal  Neurological: normal coordination and normal general cortical function      Lab Review:    Lab Results   Component Value Date    WBC 5.8 08/24/2018    HGB 14.0 08/24/2018    HCT 42.6 08/24/2018    MCV 87.7 08/24/2018     08/24/2018     Lab Results   Component Value Date     08/24/2018    K 4.6 08/24/2018     08/24/2018    CO2 27 08/24/2018    BUN 11 08/24/2018    CREATININE 0.7 08/24/2018    GLUCOSE 77 08/24/2018    GLUCOSE 83 10/22/2011    CALCIUM 10.0 08/24/2018    PROT 7.5 08/24/2018    PROT 7.1 10/22/2011    LABALBU 4.7 08/24/2018    BILITOT 0.4 08/24/2018    ALKPHOS 91 08/24/2018    AST 18 08/24/2018    ALT 17 08/24/2018    LABGLOM >60 08/24/2018    GFRAA >60 08/24/2018    GFRAA 98 10/22/2011    AGRATIO 1.7 08/24/2018    GLOB 2.8 08/24/2018     Lab Results   Component Value Date    TSH 6.12 08/24/2018    FT3 2.6 08/24/2018     No results found for: LABA1C  No results found for: EAG  Lab Results   Component Value Date    CHOL 231 07/06/2018     Lab Results   Component Value Date    TRIG 83 07/06/2018     Lab Results   Component Value Date    HDL 77 07/06/2018    HDL 51 03/14/2012     Lab Results   Component Value Date    LDLCALC 137 07/06/2018     Lab Results   Component Value Date    LABVLDL 17 07/06/2018     No results found for: CHOLHDLRATIO  No

## 2018-09-28 ENCOUNTER — OFFICE VISIT (OUTPATIENT)
Dept: FAMILY MEDICINE CLINIC | Age: 33
End: 2018-09-28
Payer: COMMERCIAL

## 2018-09-28 VITALS
HEIGHT: 66 IN | WEIGHT: 210 LBS | BODY MASS INDEX: 33.75 KG/M2 | RESPIRATION RATE: 14 BRPM | SYSTOLIC BLOOD PRESSURE: 116 MMHG | DIASTOLIC BLOOD PRESSURE: 66 MMHG | HEART RATE: 72 BPM | OXYGEN SATURATION: 96 %

## 2018-09-28 DIAGNOSIS — Z00.00 ANNUAL PHYSICAL EXAM: Primary | ICD-10-CM

## 2018-09-28 PROCEDURE — 99395 PREV VISIT EST AGE 18-39: CPT | Performed by: FAMILY MEDICINE

## 2018-09-28 ASSESSMENT — ENCOUNTER SYMPTOMS
ABDOMINAL PAIN: 0
EYE PAIN: 0
DIARRHEA: 0
SORE THROAT: 0
SHORTNESS OF BREATH: 0
CONSTIPATION: 0
EYE ITCHING: 0
COUGH: 0
APNEA: 0
COLOR CHANGE: 0
BACK PAIN: 0
VOMITING: 0
RHINORRHEA: 0

## 2018-09-28 ASSESSMENT — PATIENT HEALTH QUESTIONNAIRE - PHQ9
SUM OF ALL RESPONSES TO PHQ QUESTIONS 1-9: 0
1. LITTLE INTEREST OR PLEASURE IN DOING THINGS: 0
SUM OF ALL RESPONSES TO PHQ9 QUESTIONS 1 & 2: 0
2. FEELING DOWN, DEPRESSED OR HOPELESS: 0
SUM OF ALL RESPONSES TO PHQ QUESTIONS 1-9: 0

## 2018-09-28 NOTE — PROGRESS NOTES
(ZYRTEC) 10 MG tablet Take by mouth      Flaxseed, Linseed, 1000 MG CAPS Take  by mouth.  fish oil-omega-3 fatty acids 1000 MG capsule Take 1,600 mg by mouth daily       multivitamin (THERAGRAN) per tablet Take 1 tablet by mouth daily.  Cholecalciferol (VITAMIN D) 2000 units CAPS capsule Take 2,000 Units by mouth daily. Past Medical History:   Diagnosis Date    Acquired hypothyroidism 8/26/2018    Allergic rhinitis     Class 1 obesity in adult 8/20/2018    Goiter, non-toxic     Hypertension     Hyperthyroidism 8/20/2018     Past Surgical History:   Procedure Laterality Date    CYST REMOVAL  07/18/2018    Scalp    WISDOM TOOTH EXTRACTION       Family History   Problem Relation Age of Onset    High Blood Pressure Mother     High Cholesterol Mother     High Blood Pressure Father     High Cholesterol Father     Heart Disease Father     High Cholesterol Brother     Heart Disease Paternal Aunt     Stroke Paternal Uncle     Heart Disease Paternal Uncle     Stroke Maternal Grandmother     Arthritis Paternal Grandmother     Heart Disease Paternal Grandfather      Social History     Social History    Marital status:      Spouse name: N/A    Number of children: N/A    Years of education: N/A     Occupational History    Not on file. Social History Main Topics    Smoking status: Never Smoker    Smokeless tobacco: Never Used    Alcohol use Yes      Comment: 1-2 per month on average    Drug use: No    Sexual activity: Yes     Other Topics Concern    Not on file     Social History Narrative    No narrative on file       Review of Systems:  Review of Systems   Constitutional: Positive for fatigue. Negative for activity change and appetite change. HENT: Negative for rhinorrhea and sore throat. Eyes: Negative for pain and itching. Respiratory: Negative for apnea, cough and shortness of breath.          Snores   Cardiovascular: Negative for chest pain and 1 year (around 9/28/2019) for Physical Exam.      Prior to Visit Medications    Medication Sig Taking? Authorizing Provider   cetirizine (ZYRTEC) 10 MG tablet Take by mouth Yes Historical Provider, MD   Flaxseed, Linseed, 1000 MG CAPS Take  by mouth. Yes Historical Provider, MD   fish oil-omega-3 fatty acids 1000 MG capsule Take 1,600 mg by mouth daily  Yes Historical Provider, MD   multivitamin [facility]) per tablet Take 1 tablet by mouth daily. Yes Historical Provider, MD   Cholecalciferol (VITAMIN D) 2000 units CAPS capsule Take 2,000 Units by mouth daily.    Yes Historical Provider, MD   fluticasone (FLONASE) 50 MCG/ACT nasal spray 2 sprays by Nasal route daily  Jeancarlos Rodríguez MD

## 2018-10-29 ENCOUNTER — OFFICE VISIT (OUTPATIENT)
Dept: FAMILY MEDICINE CLINIC | Age: 33
End: 2018-10-29
Payer: COMMERCIAL

## 2018-10-29 VITALS
DIASTOLIC BLOOD PRESSURE: 70 MMHG | WEIGHT: 210 LBS | SYSTOLIC BLOOD PRESSURE: 118 MMHG | HEART RATE: 74 BPM | RESPIRATION RATE: 16 BRPM | HEIGHT: 66 IN | BODY MASS INDEX: 33.75 KG/M2 | OXYGEN SATURATION: 94 %

## 2018-10-29 DIAGNOSIS — L72.11 PILAR CYST: Primary | ICD-10-CM

## 2018-10-29 PROCEDURE — 99213 OFFICE O/P EST LOW 20 MIN: CPT | Performed by: NURSE PRACTITIONER

## 2018-10-29 RX ORDER — SULFAMETHOXAZOLE AND TRIMETHOPRIM 800; 160 MG/1; MG/1
1 TABLET ORAL 2 TIMES DAILY
Qty: 20 TABLET | Refills: 0 | Status: SHIPPED | OUTPATIENT
Start: 2018-10-29 | End: 2018-11-08

## 2018-10-29 RX ORDER — ASCORBIC ACID 500 MG
500 TABLET ORAL DAILY
COMMUNITY
End: 2019-02-21

## 2018-10-29 ASSESSMENT — ENCOUNTER SYMPTOMS
GASTROINTESTINAL NEGATIVE: 1
ALLERGIC/IMMUNOLOGIC NEGATIVE: 1
EYES NEGATIVE: 1
RESPIRATORY NEGATIVE: 1

## 2018-10-29 ASSESSMENT — PATIENT HEALTH QUESTIONNAIRE - PHQ9
1. LITTLE INTEREST OR PLEASURE IN DOING THINGS: 0
SUM OF ALL RESPONSES TO PHQ QUESTIONS 1-9: 0
SUM OF ALL RESPONSES TO PHQ9 QUESTIONS 1 & 2: 0
2. FEELING DOWN, DEPRESSED OR HOPELESS: 0
SUM OF ALL RESPONSES TO PHQ QUESTIONS 1-9: 0

## 2018-10-29 NOTE — PROGRESS NOTES
finished as prescribed. Take it with food. This medicine can wipe out some of the good bacteria in your body along with the bad causing the infection. Please eat yogurt or take a probiotic daily to prevent diarrhea and/or yeast infection    If you are on birth control pills, this medication may interfere with the effectiveness of the pill so please use a backup method of birth control during and for 1 week after you are on the antibiotic    Return if symptoms worsen or fail to improve. An electronic signature was used to authenticate this note.     --CATY Major - CNP on 10/29/2018 at 4:57 PM

## 2018-11-07 DIAGNOSIS — E03.9 ACQUIRED HYPOTHYROIDISM: ICD-10-CM

## 2018-11-07 PROBLEM — Z86.39 HISTORY OF HYPERTHYROIDISM: Status: ACTIVE | Noted: 2018-08-20

## 2018-11-07 LAB
A/G RATIO: 1.6 (ref 1.1–2.2)
ALBUMIN SERPL-MCNC: 4.7 G/DL (ref 3.4–5)
ALP BLD-CCNC: 83 U/L (ref 40–129)
ALT SERPL-CCNC: 17 U/L (ref 10–40)
ANION GAP SERPL CALCULATED.3IONS-SCNC: 16 MMOL/L (ref 3–16)
AST SERPL-CCNC: 16 U/L (ref 15–37)
BILIRUB SERPL-MCNC: <0.2 MG/DL (ref 0–1)
BUN BLDV-MCNC: 12 MG/DL (ref 7–20)
CALCIUM SERPL-MCNC: 10.3 MG/DL (ref 8.3–10.6)
CHLORIDE BLD-SCNC: 101 MMOL/L (ref 99–110)
CO2: 24 MMOL/L (ref 21–32)
CREAT SERPL-MCNC: 0.8 MG/DL (ref 0.6–1.1)
GFR AFRICAN AMERICAN: >60
GFR NON-AFRICAN AMERICAN: >60
GLOBULIN: 3 G/DL
GLUCOSE BLD-MCNC: 100 MG/DL (ref 70–99)
POTASSIUM SERPL-SCNC: 4.5 MMOL/L (ref 3.5–5.1)
SODIUM BLD-SCNC: 141 MMOL/L (ref 136–145)
T3 FREE: 2.7 PG/ML (ref 2.3–4.2)
T3 TOTAL: 1.04 NG/ML (ref 0.8–2)
T4 FREE: 1 NG/DL (ref 0.9–1.8)
T4 TOTAL: 6.1 UG/DL (ref 4.5–10.9)
TOTAL PROTEIN: 7.7 G/DL (ref 6.4–8.2)
TSH SERPL DL<=0.05 MIU/L-ACNC: 3.42 UIU/ML (ref 0.27–4.2)

## 2018-11-08 ENCOUNTER — OFFICE VISIT (OUTPATIENT)
Dept: ENDOCRINOLOGY | Age: 33
End: 2018-11-08
Payer: COMMERCIAL

## 2018-11-08 VITALS
SYSTOLIC BLOOD PRESSURE: 123 MMHG | OXYGEN SATURATION: 97 % | HEIGHT: 66 IN | WEIGHT: 209.6 LBS | HEART RATE: 78 BPM | BODY MASS INDEX: 33.68 KG/M2 | DIASTOLIC BLOOD PRESSURE: 80 MMHG

## 2018-11-08 DIAGNOSIS — E04.2 MULTINODULAR GOITER: Primary | ICD-10-CM

## 2018-11-08 DIAGNOSIS — E66.9 CLASS 1 OBESITY WITHOUT SERIOUS COMORBIDITY WITH BODY MASS INDEX (BMI) OF 33.0 TO 33.9 IN ADULT, UNSPECIFIED OBESITY TYPE: ICD-10-CM

## 2018-11-08 DIAGNOSIS — E03.9 ACQUIRED HYPOTHYROIDISM: ICD-10-CM

## 2018-11-08 DIAGNOSIS — Z86.39 HISTORY OF HYPERTHYROIDISM: ICD-10-CM

## 2018-11-08 DIAGNOSIS — E06.3 HASHIMOTO'S THYROIDITIS: ICD-10-CM

## 2018-11-08 PROCEDURE — 99213 OFFICE O/P EST LOW 20 MIN: CPT | Performed by: INTERNAL MEDICINE

## 2019-01-11 ENCOUNTER — PATIENT MESSAGE (OUTPATIENT)
Dept: ENDOCRINOLOGY | Age: 34
End: 2019-01-11

## 2019-01-11 DIAGNOSIS — E03.9 ACQUIRED HYPOTHYROIDISM: Primary | ICD-10-CM

## 2019-01-11 RX ORDER — LEVOTHYROXINE SODIUM 0.03 MG/1
25 TABLET ORAL
Qty: 30 TABLET | Refills: 3 | Status: SHIPPED | OUTPATIENT
Start: 2019-01-11 | End: 2019-02-14 | Stop reason: SDUPTHER

## 2019-02-07 LAB
ANTI-THYROGLOB ABS: 197 IU/ML
T3 FREE: 2.9 PG/ML (ref 2.3–4.2)
T3 TOTAL: 1.13 NG/ML (ref 0.8–2)
T4 FREE: 1.1 NG/DL (ref 0.9–1.8)
T4 TOTAL: 6.9 UG/DL (ref 4.5–10.9)
THYROID PEROXIDASE (TPO) ABS: 344 IU/ML
TSH SERPL DL<=0.05 MIU/L-ACNC: 1.61 UIU/ML (ref 0.27–4.2)

## 2019-02-10 ENCOUNTER — E-VISIT (OUTPATIENT)
Dept: FAMILY MEDICINE CLINIC | Age: 34
End: 2019-02-10
Payer: COMMERCIAL

## 2019-02-10 DIAGNOSIS — H10.9 BACTERIAL CONJUNCTIVITIS: Primary | ICD-10-CM

## 2019-02-10 PROCEDURE — 99444 PR PHYSICIAN ONLINE EVALUATION & MANAGEMENT SERVICE: CPT | Performed by: NURSE PRACTITIONER

## 2019-02-10 RX ORDER — POLYMYXIN B SULFATE AND TRIMETHOPRIM 1; 10000 MG/ML; [USP'U]/ML
1 SOLUTION OPHTHALMIC EVERY 6 HOURS
Qty: 10 ML | Refills: 0 | Status: SHIPPED | OUTPATIENT
Start: 2019-02-10 | End: 2019-02-17

## 2019-02-14 ENCOUNTER — OFFICE VISIT (OUTPATIENT)
Dept: ENDOCRINOLOGY | Age: 34
End: 2019-02-14
Payer: COMMERCIAL

## 2019-02-14 VITALS
DIASTOLIC BLOOD PRESSURE: 80 MMHG | HEART RATE: 73 BPM | WEIGHT: 220.6 LBS | SYSTOLIC BLOOD PRESSURE: 115 MMHG | BODY MASS INDEX: 35.45 KG/M2 | HEIGHT: 66 IN

## 2019-02-14 DIAGNOSIS — E03.9 ACQUIRED HYPOTHYROIDISM: ICD-10-CM

## 2019-02-14 DIAGNOSIS — Z86.39 HISTORY OF HYPERTHYROIDISM: ICD-10-CM

## 2019-02-14 DIAGNOSIS — E78.2 MIXED HYPERLIPIDEMIA: ICD-10-CM

## 2019-02-14 DIAGNOSIS — E06.3 HASHIMOTO'S THYROIDITIS: ICD-10-CM

## 2019-02-14 DIAGNOSIS — E66.9 CLASS 2 OBESITY WITHOUT SERIOUS COMORBIDITY WITH BODY MASS INDEX (BMI) OF 35.0 TO 35.9 IN ADULT, UNSPECIFIED OBESITY TYPE: ICD-10-CM

## 2019-02-14 DIAGNOSIS — E04.2 MULTINODULAR GOITER: ICD-10-CM

## 2019-02-14 PROBLEM — E66.812 CLASS 2 OBESITY WITH BODY MASS INDEX (BMI) OF 35.0 TO 35.9 IN ADULT: Status: ACTIVE | Noted: 2018-08-20

## 2019-02-14 PROCEDURE — 99214 OFFICE O/P EST MOD 30 MIN: CPT | Performed by: INTERNAL MEDICINE

## 2019-02-14 RX ORDER — LEVOTHYROXINE SODIUM 0.03 MG/1
25 TABLET ORAL
Qty: 30 TABLET | Refills: 3 | Status: SHIPPED | OUTPATIENT
Start: 2019-02-14 | End: 2019-04-18 | Stop reason: SDUPTHER

## 2019-02-21 ENCOUNTER — OFFICE VISIT (OUTPATIENT)
Dept: DERMATOLOGY | Age: 34
End: 2019-02-21
Payer: COMMERCIAL

## 2019-02-21 DIAGNOSIS — L81.4 LENTIGINES: ICD-10-CM

## 2019-02-21 DIAGNOSIS — Z78.9 NON-TOBACCO USER: ICD-10-CM

## 2019-02-21 DIAGNOSIS — D22.9 MULTIPLE BENIGN MELANOCYTIC NEVI: Primary | ICD-10-CM

## 2019-02-21 DIAGNOSIS — F42.4 SKIN-PICKING DISORDER: ICD-10-CM

## 2019-02-21 DIAGNOSIS — L81.9 HYPERPIGMENTATION: ICD-10-CM

## 2019-02-21 DIAGNOSIS — Z87.2 HISTORY OF FOLLICULITIS: ICD-10-CM

## 2019-02-21 PROCEDURE — 99243 OFF/OP CNSLTJ NEW/EST LOW 30: CPT | Performed by: DERMATOLOGY

## 2019-02-21 RX ORDER — CLINDAMYCIN PHOSPHATE 10 UG/ML
LOTION TOPICAL
Qty: 60 ML | Refills: 1 | Status: SHIPPED | OUTPATIENT
Start: 2019-02-21 | End: 2019-08-13

## 2019-04-11 DIAGNOSIS — E03.9 ACQUIRED HYPOTHYROIDISM: ICD-10-CM

## 2019-04-11 LAB
A/G RATIO: 1.5 (ref 1.1–2.2)
ALBUMIN SERPL-MCNC: 4.6 G/DL (ref 3.4–5)
ALP BLD-CCNC: 75 U/L (ref 40–129)
ALT SERPL-CCNC: 18 U/L (ref 10–40)
ANION GAP SERPL CALCULATED.3IONS-SCNC: 10 MMOL/L (ref 3–16)
AST SERPL-CCNC: 18 U/L (ref 15–37)
BILIRUB SERPL-MCNC: 0.5 MG/DL (ref 0–1)
BUN BLDV-MCNC: 11 MG/DL (ref 7–20)
CALCIUM SERPL-MCNC: 9.9 MG/DL (ref 8.3–10.6)
CHLORIDE BLD-SCNC: 103 MMOL/L (ref 99–110)
CO2: 28 MMOL/L (ref 21–32)
CREAT SERPL-MCNC: 0.8 MG/DL (ref 0.6–1.1)
GFR AFRICAN AMERICAN: >60
GFR NON-AFRICAN AMERICAN: >60
GLOBULIN: 3.1 G/DL
GLUCOSE BLD-MCNC: 102 MG/DL (ref 70–99)
POTASSIUM SERPL-SCNC: 4.7 MMOL/L (ref 3.5–5.1)
SODIUM BLD-SCNC: 141 MMOL/L (ref 136–145)
T3 FREE: 3 PG/ML (ref 2.3–4.2)
T3 TOTAL: 1.13 NG/ML (ref 0.8–2)
T4 FREE: 1.2 NG/DL (ref 0.9–1.8)
T4 TOTAL: 7.1 UG/DL (ref 4.5–10.9)
TOTAL PROTEIN: 7.7 G/DL (ref 6.4–8.2)
TSH SERPL DL<=0.05 MIU/L-ACNC: 1.32 UIU/ML (ref 0.27–4.2)

## 2019-04-15 LAB — T3 REVERSE: 14.5 NG/DL (ref 9–27)

## 2019-04-18 ENCOUNTER — OFFICE VISIT (OUTPATIENT)
Dept: ENDOCRINOLOGY | Age: 34
End: 2019-04-18
Payer: COMMERCIAL

## 2019-04-18 VITALS
DIASTOLIC BLOOD PRESSURE: 89 MMHG | WEIGHT: 229 LBS | BODY MASS INDEX: 36.8 KG/M2 | HEIGHT: 66 IN | SYSTOLIC BLOOD PRESSURE: 126 MMHG | HEART RATE: 72 BPM

## 2019-04-18 DIAGNOSIS — E04.2 MULTINODULAR GOITER: ICD-10-CM

## 2019-04-18 DIAGNOSIS — Z86.39 HISTORY OF HYPERTHYROIDISM: ICD-10-CM

## 2019-04-18 DIAGNOSIS — E66.9 CLASS 2 OBESITY WITHOUT SERIOUS COMORBIDITY WITH BODY MASS INDEX (BMI) OF 36.0 TO 36.9 IN ADULT, UNSPECIFIED OBESITY TYPE: ICD-10-CM

## 2019-04-18 DIAGNOSIS — E03.9 ACQUIRED HYPOTHYROIDISM: Primary | ICD-10-CM

## 2019-04-18 DIAGNOSIS — E06.3 HASHIMOTO'S THYROIDITIS: ICD-10-CM

## 2019-04-18 PROCEDURE — 99214 OFFICE O/P EST MOD 30 MIN: CPT | Performed by: INTERNAL MEDICINE

## 2019-04-18 RX ORDER — LEVOTHYROXINE SODIUM 0.03 MG/1
25 TABLET ORAL
Qty: 90 TABLET | Refills: 1 | Status: SHIPPED | OUTPATIENT
Start: 2019-04-18 | End: 2019-10-18

## 2019-04-18 NOTE — PROGRESS NOTES
SUBJECTIVE:  Zane Malhotra is a 26 y.o. female who is here for a hyperthyroidism, multinodular goiter. 1. Hypothyroidism    This started in . Patient was diagnosed with MNG. The problem has been worsening. Previous thyroid studies include: TSH and free thyroxine. On levothyroxine 0.025 mg daily. Current complaints: weight gain, insomnia, fatigue, hair loss. Had a lot of stress, father, brother passed away recently. 2. Multinodular goiter    In  she was diagnosed with 1.5 cm cystic nodule. Repeated US - right 1.4 cm, right superior 1.7 cm, left subcm nodules. History of obstructive symptoms: difficulty swallowing No, changes in voice/hoarseness No.  History of radiation to patient's neck: No  Resent iodine exposure: No  Family history includes thyroid abnormalities. Family history of thyroid cancer: No    3. Class 1 obesity with body mass index (BMI) of 36.0 to 36.9 in adult, unspecified obesity type, unspecified whether serious comorbidity present    Eats moderately healthy. 2. History of Hyperthyroidism    No diarrhea. Hair loss postpartum. Calcium 10.6, ULN 10.5. Had son born 3/28/2018. Has another boy 5yo  Has Mary implant for birth control. Had short periods since 3/2018.    5. Hashimoto's thyroiditis  Has fatigue. 6. Postpartum thyroiditis  Has fatigue. Thyroid sono copy from Dr. Felipe Walton note: The patient was placed in a semi-recumbent position with mild neck extension. Real time B-mode ultrasound on the neck was performed in transverse/ axial and longitudinal/ sagittal planes. Stable heterogenous MNG with cyto nondiagnostic R inf predominately cystic 1.1cm (prior1.4cm) with R sup solid 1.5cm (prior 1.7cm) and L inf solid hypo 1.1cm without suspicious features. Bilateral infrathyroid LNs benign appearing likely reactive. The patient tolerated the procedure well and without side effects. 7. IFG  Glucose 102.     Name: Wander Robles   :  [id number]  Reason:  THYROID NODULE  Dict. Staff: Elgin Perez [id number]    Verified By: Ariana SANZ         Samm: 4/17/09  11:51 am  Exams:  US-THYROID/NECK/HEAD      Thyroid ultrasound on 04/16/2009    History: Thyroid nodule    Comparison: None    Findings: The right lobe of the gland measures greater than 5 cm in  craniocaudal dimension. It extends beyond the confines of the  transducer. Transversely, it measures 2.4 x 2.5 cm. The left  lobe measures 4.8 x 2.4 x 1.6 cm. The thyroid demonstrates  diffusely heterogeneous echogenicity. There is coarsening of the  echotexture. Within the right lobe, there is a 1.3 x 0.7 x 0.9  cm cyst. There is also a hypoechoic nodule measuring 1.2 x 0.9 x  0.7 cm. The remainder of the gland is diffusely heterogeneous  and may contain additional small hypoechoic nodules. Impression:    Diffusely heterogeneous thyroid gland. There is a discrete cyst  and a discrete nodule seen within the right lobe as described  above.   * end of result *   Status         Past Medical History:   Diagnosis Date    Acquired hypothyroidism 8/26/2018    Allergic rhinitis     Class 1 obesity in adult 8/20/2018    Goiter, non-toxic     Hypertension     Hyperthyroidism 8/20/2018     Patient Active Problem List    Diagnosis Date Noted    Postpartum thyroiditis 08/26/2018    Hashimoto's thyroiditis 08/26/2018    Acquired hypothyroidism 08/26/2018    History of hyperthyroidism 08/20/2018    Class 2 obesity with body mass index (BMI) of 36.0 to 36.9 in adult 08/20/2018    GDM (gestational diabetes mellitus), class A1 01/10/2018    Psychophysiological insomnia 02/05/2017    Allergic rhinitis 07/20/2016    Multinodular goiter 05/02/2011    Mixed hyperlipidemia 05/02/2011    Vitamin D deficiency 05/02/2011    Anxiety 05/02/2011    Elevated blood pressure reading without diagnosis of hypertension 05/02/2011     Past Surgical History:   Procedure to affected areas on armpits after shaving, shave in direction of hair growth 60 mL 1    cetirizine (ZYRTEC) 10 MG tablet Take by mouth      Flaxseed, Linseed, 1000 MG CAPS Take  by mouth.  fish oil-omega-3 fatty acids 1000 MG capsule Take 1,600 mg by mouth daily       multivitamin (THERAGRAN) per tablet Take 1 tablet by mouth daily.  Cholecalciferol (VITAMIN D) 2000 units CAPS capsule Take 2,000 Units by mouth daily. No current facility-administered medications for this visit.       Allergies   Allergen Reactions    Prednisone      flushing     Family Status   Relation Name Status    Mother  Alive    Father      Brother  Alive   Harvinder Nguyen        one  from sudden death   [place] [address]      MGM      [address]  Alive    PGF      MGF  Alive        thyroid, gout    Brother         Review of Systems:  Constitutional: has fatigue, no fever, has recent weight gain, no recent weight loss, no changes in appetite  Eyes: no eye pain, no change in vision, no eye redness, no eye irritation, no double vision  Ears, nose, throat: no nasal congestion, no sore throat, no earache, no decrease in hearing, no hoarseness, no dry mouth, no sinus problems, no difficulty swallowing, has neck lumps, no dental problems, no mouth sores, no ringing in ears  Pulmonary: no shortness of breath, no wheezing, no dyspnea on exertion, no cough  Cardiovascular: no chest pain, no lower extremity edema, no orthopnea, no intermittent leg claudication, no palpitations  Gastrointestinal: no abdominal pain, no nausea, no vomiting, no diarrhea, no constipation, no dysphagia, no heartburn, no bloating  Genitourinary: no dysuria, no urinary incontinence, no urinary hesitancy, no urinary frequency, no feelings of urinary urgency, no nocturia  Musculoskeletal: no joint swelling, no joint stiffness, no joint pain, no muscle cramps, no muscle pain, no bone pain  Integument/Breast: has hair loss, no skin rashes, no skin lesions, no itching, no dry skin  Neurological: no numbness, no tingling, no weakness, no confusion, no headaches, no dizziness, no fainting, no tremors, no decrease in memory, no balance problems  Psychiatric: has anxiety, no depression, has insomnia  Hematologic/Lymphatic: no tendency for easy bleeding, no swollen lymph nodes, no tendency for easy bruising  Immunology: has seasonal allergies, no frequent infections, no frequent illnesses  Endocrine: no temperature intolerance    /89 (Site: Left Lower Arm, Position: Sitting, Cuff Size: Medium Adult)   Pulse 72   Ht 5' 6\" (1.676 m)   Wt 229 lb (103.9 kg)   BMI 36.96 kg/m²    Wt Readings from Last 3 Encounters:   04/18/19 229 lb (103.9 kg)   02/14/19 220 lb 9.6 oz (100.1 kg)   11/08/18 209 lb 9.6 oz (95.1 kg)     Body mass index is 36.96 kg/m².     OBJECTIVE:  Constitutional: no acute distress, well appearing and well nourished  Psychiatric: oriented to person, place and time, judgement and insight and normal, recent and remote memory and intact and mood and affect are normal  Skin: skin and subcutaneous tissue is normal without mass, normal turgor  Head and Face: examination of head and face revealed no abnormalities  Eyes: no lid or conjunctival swelling, erythema or discharge, pupils are normal, equal, round, reactive to light  Ears/Nose: external inspection of ears and nose revealed no abnormalities, hearing is grossly normal  Oropharynx/Mouth/Face: lips, tongue and gums are normal with no lesions, the voice quality was normal  Neck: neck is supple and symmetric, with midline trachea and no masses, thyroid is enlarged  Lymphatics: normal cervical lymph nodes, normal supraclavicular nodes  Pulmonary: no increased work of breathing or signs of respiratory distress, lungs are clear to auscultation  Cardiovascular: normal heart rate and rhythm, normal S1 and S2, no murmurs and pedal pulses and 2+ bilaterally, No edema  Abdomen: nondiagnostic R inf predominately cystic 1.1cm (prior1.4cm) with R sup solid 1.5cm (prior 1.7cm) and L inf solid hypo 1.1cm without suspicious features. Bilateral infrathyroid LNs benign appearing likely reactive. 4. Hashimoto's thyroiditis  Follow TSH. 5. Postpartum thyroiditis  Follow TSH. 6. History of Hyperthyroidism  - T3, Free; Future  - T4, Free; Future  - TSH without Reflex; Future    7. IFG   Glucose 102. Hx of gestational diabetes. Prediabetes education    Reviewed and/or ordered clinical lab results Yes  Reviewed and/or ordered radiology tests Yes   Reviewed and/or ordered other diagnostic tests No  Discussed test results with performing physician No  Independently reviewed image, tracing, or specimen No  Made a decision to obtain old records No  Reviewed and summarized old records Yes  In 2001 she was diagnosed with 1.5 cm cystic nodule. Repeated US - right 1.4 cm, right superior 1.7 cm, left subcm nodules. TSH 7/21/2018 <0.01  TSH 7/2016 2.36  TSH 8/2018 6.12  TSH 10/14/2018 4.63  11/7/2018 TSH 3.42  Obtained history from other than patient Aliyah Santiago Lorenzo was counseled regarding symptoms of thyroid diagnosis, course and complications of disease if inadequately treated, side effects of medications, diagnosis, treatment options, and prognosis, risks, benefits, complications, and alternatives of treatment, labs, imaging and other studies and treatment targets and goals, thyorid nodules, hyperthyroidism causes treatment. .  She understands instructions and counseling. Return in about 6 months (around 10/18/2019) for me - 3 months, Jane - prediabetes, life time modifications, 40 min.

## 2019-08-05 ENCOUNTER — TELEPHONE (OUTPATIENT)
Dept: FAMILY MEDICINE CLINIC | Age: 34
End: 2019-08-05

## 2019-08-05 DIAGNOSIS — Z00.00 PHYSICAL EXAM: Primary | ICD-10-CM

## 2019-08-13 ENCOUNTER — OFFICE VISIT (OUTPATIENT)
Dept: FAMILY MEDICINE CLINIC | Age: 34
End: 2019-08-13
Payer: COMMERCIAL

## 2019-08-13 VITALS
DIASTOLIC BLOOD PRESSURE: 84 MMHG | RESPIRATION RATE: 18 BRPM | OXYGEN SATURATION: 97 % | WEIGHT: 234 LBS | SYSTOLIC BLOOD PRESSURE: 128 MMHG | HEIGHT: 66 IN | BODY MASS INDEX: 37.61 KG/M2 | HEART RATE: 86 BPM

## 2019-08-13 DIAGNOSIS — E05.90 HYPERTHYROIDISM: ICD-10-CM

## 2019-08-13 DIAGNOSIS — E03.0 CONGENITAL HYPOTHYROIDISM WITH DIFFUSE GOITER: Primary | ICD-10-CM

## 2019-08-13 DIAGNOSIS — E66.01 MORBID OBESITY (HCC): ICD-10-CM

## 2019-08-13 PROCEDURE — 99212 OFFICE O/P EST SF 10 MIN: CPT | Performed by: NURSE PRACTITIONER

## 2019-08-13 ASSESSMENT — PATIENT HEALTH QUESTIONNAIRE - PHQ9
SUM OF ALL RESPONSES TO PHQ QUESTIONS 1-9: 0
2. FEELING DOWN, DEPRESSED OR HOPELESS: 0
SUM OF ALL RESPONSES TO PHQ QUESTIONS 1-9: 0
1. LITTLE INTEREST OR PLEASURE IN DOING THINGS: 0
SUM OF ALL RESPONSES TO PHQ9 QUESTIONS 1 & 2: 0

## 2019-08-13 NOTE — PROGRESS NOTES
Subjective:      No chief complaint on file. Patient ID: Dang Rausch is a 37 y.o. female who presents to have her papers filled out for work. Denies any problems states she is seeing the endocrinologist regularly for her thyroid and feels everything is going good last TSH at the endocrinologist was 1.3 that was in July of this year continued on same dose of Synthroid also in July her lipid levels were drawn total 231 HDL 77  triglycerides 83 she is on fish oil and flaxseed. Stated she got some \"drunk\" from the grandkids and just had to take time to get out of her body. She said she was congested but did not want any medication.     HPI    Family History   Problem Relation Age of Onset    High Blood Pressure Mother     High Cholesterol Mother     High Blood Pressure Father     High Cholesterol Father     Heart Disease Father     High Cholesterol Brother     Heart Disease Paternal Aunt     Stroke Paternal Uncle     Heart Disease Paternal Uncle     Stroke Maternal Grandmother     Arthritis Paternal Grandmother     Heart Disease Paternal Grandfather        Social History     Socioeconomic History    Marital status:      Spouse name: Not on file    Number of children: Not on file    Years of education: Not on file    Highest education level: Not on file   Occupational History    Not on file   Social Needs    Financial resource strain: Not on file    Food insecurity:     Worry: Not on file     Inability: Not on file    Transportation needs:     Medical: Not on file     Non-medical: Not on file   Tobacco Use    Smoking status: Never Smoker    Smokeless tobacco: Never Used   Substance and Sexual Activity    Alcohol use: Yes     Comment: 1-2 per month on average    Drug use: No    Sexual activity: Yes   Lifestyle    Physical activity:     Days per week: Not on file     Minutes per session: Not on file    Stress: Not on file   Relationships    Social connections:     Talks on

## 2019-10-16 DIAGNOSIS — E03.9 ACQUIRED HYPOTHYROIDISM: ICD-10-CM

## 2019-10-16 LAB
A/G RATIO: 2.1 (ref 1.1–2.2)
ALBUMIN SERPL-MCNC: 5.1 G/DL (ref 3.4–5)
ALP BLD-CCNC: 75 U/L (ref 40–129)
ALT SERPL-CCNC: 28 U/L (ref 10–40)
ANION GAP SERPL CALCULATED.3IONS-SCNC: 16 MMOL/L (ref 3–16)
AST SERPL-CCNC: 26 U/L (ref 15–37)
BILIRUB SERPL-MCNC: 0.5 MG/DL (ref 0–1)
BUN BLDV-MCNC: 9 MG/DL (ref 7–20)
CALCIUM SERPL-MCNC: 9.8 MG/DL (ref 8.3–10.6)
CHLORIDE BLD-SCNC: 103 MMOL/L (ref 99–110)
CO2: 23 MMOL/L (ref 21–32)
CREAT SERPL-MCNC: 0.7 MG/DL (ref 0.6–1.1)
GFR AFRICAN AMERICAN: >60
GFR NON-AFRICAN AMERICAN: >60
GLOBULIN: 2.4 G/DL
GLUCOSE BLD-MCNC: 88 MG/DL (ref 70–99)
POTASSIUM SERPL-SCNC: 4.6 MMOL/L (ref 3.5–5.1)
SODIUM BLD-SCNC: 142 MMOL/L (ref 136–145)
T3 FREE: 3.2 PG/ML (ref 2.3–4.2)
T4 FREE: 1.3 NG/DL (ref 0.9–1.8)
TOTAL PROTEIN: 7.5 G/DL (ref 6.4–8.2)
TSH SERPL DL<=0.05 MIU/L-ACNC: 1.65 UIU/ML (ref 0.27–4.2)

## 2019-10-18 ENCOUNTER — OFFICE VISIT (OUTPATIENT)
Dept: ENDOCRINOLOGY | Age: 34
End: 2019-10-18
Payer: COMMERCIAL

## 2019-10-18 VITALS
WEIGHT: 234.6 LBS | HEIGHT: 68 IN | BODY MASS INDEX: 35.55 KG/M2 | SYSTOLIC BLOOD PRESSURE: 125 MMHG | HEART RATE: 80 BPM | OXYGEN SATURATION: 98 % | DIASTOLIC BLOOD PRESSURE: 88 MMHG

## 2019-10-18 DIAGNOSIS — E06.3 HASHIMOTO'S THYROIDITIS: ICD-10-CM

## 2019-10-18 DIAGNOSIS — E04.2 MULTINODULAR GOITER: ICD-10-CM

## 2019-10-18 DIAGNOSIS — Z86.39 HISTORY OF HYPERTHYROIDISM: ICD-10-CM

## 2019-10-18 DIAGNOSIS — E55.9 VITAMIN D DEFICIENCY: ICD-10-CM

## 2019-10-18 DIAGNOSIS — E78.2 MIXED HYPERLIPIDEMIA: ICD-10-CM

## 2019-10-18 DIAGNOSIS — E03.9 ACQUIRED HYPOTHYROIDISM: Primary | ICD-10-CM

## 2019-10-18 PROCEDURE — 99214 OFFICE O/P EST MOD 30 MIN: CPT | Performed by: INTERNAL MEDICINE

## 2019-10-18 RX ORDER — LEVOTHYROXINE SODIUM 0.05 MG/1
50 TABLET ORAL
Qty: 90 TABLET | Refills: 1 | Status: SHIPPED | OUTPATIENT
Start: 2019-10-18 | End: 2020-01-17 | Stop reason: SDUPTHER

## 2019-10-18 RX ORDER — CHLORAL HYDRATE 500 MG
2 CAPSULE ORAL
COMMUNITY
End: 2020-09-10 | Stop reason: SDUPTHER

## 2020-01-14 ENCOUNTER — NURSE TRIAGE (OUTPATIENT)
Dept: OTHER | Facility: CLINIC | Age: 35
End: 2020-01-14

## 2020-01-14 ENCOUNTER — OFFICE VISIT (OUTPATIENT)
Dept: FAMILY MEDICINE CLINIC | Age: 35
End: 2020-01-14
Payer: COMMERCIAL

## 2020-01-14 VITALS
RESPIRATION RATE: 16 BRPM | WEIGHT: 236 LBS | HEART RATE: 115 BPM | BODY MASS INDEX: 37.93 KG/M2 | OXYGEN SATURATION: 96 % | TEMPERATURE: 99.3 F | HEIGHT: 66 IN | DIASTOLIC BLOOD PRESSURE: 86 MMHG | SYSTOLIC BLOOD PRESSURE: 112 MMHG

## 2020-01-14 PROCEDURE — 99213 OFFICE O/P EST LOW 20 MIN: CPT | Performed by: FAMILY MEDICINE

## 2020-01-14 RX ORDER — TRAZODONE HYDROCHLORIDE 50 MG/1
50 TABLET ORAL NIGHTLY PRN
COMMUNITY
End: 2020-04-02 | Stop reason: SDUPTHER

## 2020-01-14 ASSESSMENT — ENCOUNTER SYMPTOMS
SHORTNESS OF BREATH: 0
TROUBLE SWALLOWING: 0
SORE THROAT: 0
COUGH: 0
DIARRHEA: 0
NAUSEA: 0

## 2020-01-14 ASSESSMENT — PATIENT HEALTH QUESTIONNAIRE - PHQ9
SUM OF ALL RESPONSES TO PHQ QUESTIONS 1-9: 0
1. LITTLE INTEREST OR PLEASURE IN DOING THINGS: 0
SUM OF ALL RESPONSES TO PHQ9 QUESTIONS 1 & 2: 0
SUM OF ALL RESPONSES TO PHQ QUESTIONS 1-9: 0
2. FEELING DOWN, DEPRESSED OR HOPELESS: 0

## 2020-01-14 NOTE — TELEPHONE ENCOUNTER
Call received from [facility]      Reason for Disposition   Spinning or tilting sensation (vertigo) present now    Protocols used: DIZZINESS-ADULT-[place]    Pt c/o feeling lightheaded and dizzy today. States she is not sure if anxiety related or not. She denies CP, SOB, fever, bleeding, one sided numbness or weakness. No visual changes, nausea or vomiting. Able to stand and walk without issue. 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 Additionally,  states that her legs, elbow and wrist felt \"sore\" today but those symptoms have resolved. Also states she has a \"sour stomach\" but is eating and drinking well. Has a toothache and taking ibuprofen. Caller reports symptoms as documented above. Caller informed of disposition. Soft transfer to pre-service center to schedule apt as recommended. Care advice as documented. Please do not respond to the triage nurse through this encounter. Any subsequent communication should be directly with the patient.

## 2020-01-14 NOTE — PROGRESS NOTES
Subjective:      Patient ID: Gonzales Ball is a 37 y.o. y.o. female.   1day not feeling quite right  Some achy arms and muscles  Nausea and then some diarrhea        HPI      Chief Complaint   Patient presents with    Dizziness     Lightheadiness - weird symptoms started on way to work - pain L elbow & wrist - Imelda Notice, then aches in legs & now all over -now has diarrhea    Chest Pain     has had in past but related to anxiety, has acid reflux & also has family history of heart disease    Fever       Allergies   Allergen Reactions    Prednisone      flushing       Past Medical History:   Diagnosis Date    Acquired hypothyroidism 8/26/2018    Allergic rhinitis     Class 1 obesity in adult 8/20/2018    Goiter, non-toxic     Hypertension     Hyperthyroidism 8/20/2018       Past Surgical History:   Procedure Laterality Date    CYST REMOVAL  07/18/2018    Scalp    WISDOM TOOTH EXTRACTION         Social History     Socioeconomic History    Marital status:      Spouse name: Not on file    Number of children: Not on file    Years of education: Not on file    Highest education level: Not on file   Occupational History    Not on file   Social Needs    Financial resource strain: Not on file    Food insecurity:     Worry: Not on file     Inability: Not on file    Transportation needs:     Medical: Not on file     Non-medical: Not on file   Tobacco Use    Smoking status: Never Smoker    Smokeless tobacco: Never Used   Substance and Sexual Activity    Alcohol use: Yes     Comment: 1-2 per month on average    Drug use: No    Sexual activity: Yes   Lifestyle    Physical activity:     Days per week: Not on file     Minutes per session: Not on file    Stress: Not on file   Relationships    Social connections:     Talks on phone: Not on file     Gets together: Not on file     Attends Yarsanism service: Not on file     Active member of club or organization: Not on file     Attends meetings of clubs or scleral icterus. Pulmonary:      Effort: Pulmonary effort is normal.      Breath sounds: Normal breath sounds. No wheezing or rhonchi. Musculoskeletal:      Right lower leg: No edema. Left lower leg: No edema. Neurological:      Mental Status: She is alert and oriented to person, place, and time. Cranial Nerves: No cranial nerve deficit. Psychiatric:         Mood and Affect: Mood normal.         Behavior: Behavior normal.         Assessment:      Viral syndrome        Plan:     Some early viral syndrome. Mucinex D  Tylenol    Follow 2 days      Current Outpatient Medications   Medication Sig Dispense Refill    traZODone (DESYREL) 50 MG tablet Take 50 mg by mouth nightly as needed for Sleep      Omega-3 Fatty Acids (FISH OIL) 1000 MG CAPS Take 2 g by mouth      levothyroxine (SYNTHROID) 50 MCG tablet Take 1 tablet by mouth every morning (before breakfast) 90 tablet 1    cetirizine (ZYRTEC) 10 MG tablet Take by mouth      Flaxseed, Linseed, 1000 MG CAPS Take  by mouth.  fish oil-omega-3 fatty acids 1000 MG capsule Take 1,600 mg by mouth daily       multivitamin (THERAGRAN) per tablet Take 1 tablet by mouth daily.  Cholecalciferol (VITAMIN D) 2000 units CAPS capsule Take 2,000 Units by mouth daily. No current facility-administered medications for this visit.

## 2020-01-15 DIAGNOSIS — E03.9 ACQUIRED HYPOTHYROIDISM: ICD-10-CM

## 2020-01-15 DIAGNOSIS — E55.9 VITAMIN D DEFICIENCY: ICD-10-CM

## 2020-01-15 LAB
A/G RATIO: 1.5 (ref 1.1–2.2)
ALBUMIN SERPL-MCNC: 4.4 G/DL (ref 3.4–5)
ALP BLD-CCNC: 66 U/L (ref 40–129)
ALT SERPL-CCNC: 26 U/L (ref 10–40)
ANION GAP SERPL CALCULATED.3IONS-SCNC: 14 MMOL/L (ref 3–16)
AST SERPL-CCNC: 25 U/L (ref 15–37)
BILIRUB SERPL-MCNC: 0.5 MG/DL (ref 0–1)
BUN BLDV-MCNC: 10 MG/DL (ref 7–20)
CALCIUM SERPL-MCNC: 9.3 MG/DL (ref 8.3–10.6)
CHLORIDE BLD-SCNC: 101 MMOL/L (ref 99–110)
CO2: 23 MMOL/L (ref 21–32)
CREAT SERPL-MCNC: 0.8 MG/DL (ref 0.6–1.1)
GFR AFRICAN AMERICAN: >60
GFR NON-AFRICAN AMERICAN: >60
GLOBULIN: 3 G/DL
GLUCOSE BLD-MCNC: 88 MG/DL (ref 70–99)
POTASSIUM SERPL-SCNC: 4.1 MMOL/L (ref 3.5–5.1)
SODIUM BLD-SCNC: 138 MMOL/L (ref 136–145)
T3 FREE: 2.6 PG/ML (ref 2.3–4.2)
T4 FREE: 1.2 NG/DL (ref 0.9–1.8)
TOTAL PROTEIN: 7.4 G/DL (ref 6.4–8.2)
TSH SERPL DL<=0.05 MIU/L-ACNC: 0.69 UIU/ML (ref 0.27–4.2)
VITAMIN D 25-HYDROXY: 49.8 NG/ML

## 2020-01-16 PROBLEM — R73.01 IFG (IMPAIRED FASTING GLUCOSE): Status: ACTIVE | Noted: 2020-01-16

## 2020-01-17 ENCOUNTER — OFFICE VISIT (OUTPATIENT)
Dept: ENDOCRINOLOGY | Age: 35
End: 2020-01-17
Payer: COMMERCIAL

## 2020-01-17 VITALS
HEIGHT: 66 IN | BODY MASS INDEX: 37.9 KG/M2 | DIASTOLIC BLOOD PRESSURE: 86 MMHG | WEIGHT: 235.8 LBS | HEART RATE: 74 BPM | SYSTOLIC BLOOD PRESSURE: 127 MMHG | OXYGEN SATURATION: 96 %

## 2020-01-17 PROCEDURE — 99214 OFFICE O/P EST MOD 30 MIN: CPT | Performed by: INTERNAL MEDICINE

## 2020-01-17 RX ORDER — LEVOTHYROXINE SODIUM 0.03 MG/1
25 TABLET ORAL DAILY
Qty: 90 TABLET | Refills: 1 | Status: SHIPPED | OUTPATIENT
Start: 2020-01-17 | End: 2020-03-17

## 2020-01-17 NOTE — PROGRESS NOTES
SUBJECTIVE:  Kurt Aguilar is a 37 y.o. female who is here for a hyperthyroidism, multinodular goiter. 1. Hypothyroidism    This started in . Patient was diagnosed with MNG. The problem has been worsening. Previous thyroid studies include: TSH and free thyroxine. On levothyroxine 0.025 mg daily. Current complaints: insomnia, fatigue, hair loss. Hair loss worse. Had a lot of stress, father, brother passed away recently. 2. Multinodular goiter    In  she was diagnosed with 1.5 cm cystic nodule. Repeated US - right 1.4 cm, right superior 1.7 cm, left subcm nodules. Dr. Elena Lares follows. History of obstructive symptoms: difficulty swallowing No, changes in voice/hoarseness No.  History of radiation to patient's neck: No  Resent iodine exposure: No  Family history includes thyroid abnormalities. Family history of thyroid cancer: No    3. Obesity  Eats moderately healthy. 2. History of Hyperthyroidism    No diarrhea. Hair loss postpartum. Calcium 10.6, ULN 10.5. Had son born 3/28/2018. Has another boy 5yo  Has Mary implant for birth control. Had short periods since 3/2018.    5. Hashimoto's thyroiditis  Has fatigue. 6. Postpartum thyroiditis  Has fatigue. Thyroid sono copy from Dr. Elena Lares note: The patient was placed in a semi-recumbent position with mild neck extension. Real time B-mode ultrasound on the neck was performed in transverse/ axial and longitudinal/ sagittal planes. Stable heterogenous MNG with cyto nondiagnostic R inf predominately cystic 1.1cm (prior1.4cm) with R sup solid 1.5cm (prior 1.7cm) and L inf solid hypo 1.1cm without suspicious features. Bilateral infrathyroid LNs benign appearing likely reactive. The patient tolerated the procedure well and without side effects. 7. IFG  Glucose 102    8. Vitamin D deficiency  Has fatigue    9.  Hyperlipidemia  Father had MI at age 46  No HTN, diabetes  No smoking      Name: Carson Beltre   :  [id number]  Reason:  THYROID NODULE  Dict. Staff: Ester Felipe [id number]    Verified By: Ortiz SANZ         Samm: 4/17/09  11:51 am  Exams:  US-THYROID/NECK/HEAD      Thyroid ultrasound on 04/16/2009    History: Thyroid nodule    Comparison: None    Findings: The right lobe of the gland measures greater than 5 cm in  craniocaudal dimension. It extends beyond the confines of the  transducer. Transversely, it measures 2.4 x 2.5 cm. The left  lobe measures 4.8 x 2.4 x 1.6 cm. The thyroid demonstrates  diffusely heterogeneous echogenicity. There is coarsening of the  echotexture. Within the right lobe, there is a 1.3 x 0.7 x 0.9  cm cyst. There is also a hypoechoic nodule measuring 1.2 x 0.9 x  0.7 cm. The remainder of the gland is diffusely heterogeneous  and may contain additional small hypoechoic nodules. Impression:    Diffusely heterogeneous thyroid gland. There is a discrete cyst  and a discrete nodule seen within the right lobe as described  above.   * end of result *   Status         Past Medical History:   Diagnosis Date    Acquired hypothyroidism 8/26/2018    Allergic rhinitis     Goiter, non-toxic     Hypertension     Hyperthyroidism 8/20/2018     Patient Active Problem List    Diagnosis Date Noted    IFG (impaired fasting glucose) 01/16/2020    Postpartum thyroiditis 08/26/2018    Hashimoto's thyroiditis 08/26/2018    Acquired hypothyroidism 08/26/2018    History of hyperthyroidism 08/20/2018    Class 2 obesity with body mass index (BMI) of 38.0 to 38.9 in adult 08/20/2018    GDM (gestational diabetes mellitus), class A1 01/10/2018    Psychophysiological insomnia 02/05/2017    Allergic rhinitis 07/20/2016    Multinodular goiter 05/02/2011    Mixed hyperlipidemia 05/02/2011    Vitamin D deficiency 05/02/2011    Anxiety 05/02/2011    Elevated blood pressure reading without diagnosis of hypertension 05/02/2011     Past Surgical History: Procedure Laterality Date    CYST REMOVAL  07/18/2018    Scalp    WISDOM TOOTH EXTRACTION       Family History   Problem Relation Age of Onset    High Blood Pressure Mother     High Cholesterol Mother     High Blood Pressure Father     High Cholesterol Father     Heart Disease Father     High Cholesterol Brother     Heart Disease Paternal Aunt     Stroke Paternal Uncle     Heart Disease Paternal Uncle     Stroke Maternal Grandmother     Arthritis Paternal Grandmother     Heart Disease Paternal Grandfather      Social History     Socioeconomic History    Marital status:      Spouse name: None    Number of children: None    Years of education: None    Highest education level: None   Occupational History    None   Social Needs    Financial resource strain: None    Food insecurity:     Worry: None     Inability: None    Transportation needs:     Medical: None     Non-medical: None   Tobacco Use    Smoking status: Never Smoker    Smokeless tobacco: Never Used   Substance and Sexual Activity    Alcohol use: Yes     Comment: 1-2 per month on average    Drug use: No    Sexual activity: Yes   Lifestyle    Physical activity:     Days per week: None     Minutes per session: None    Stress: None   Relationships    Social connections:     Talks on phone: None     Gets together: None     Attends Judaism service: None     Active member of club or organization: None     Attends meetings of clubs or organizations: None     Relationship status: None    Intimate partner violence:     Fear of current or ex partner: None     Emotionally abused: None     Physically abused: None     Forced sexual activity: None   Other Topics Concern    None   Social History Narrative    None     Current Outpatient Medications   Medication Sig Dispense Refill    levothyroxine (SYNTHROID) 25 MCG tablet Take 1 tablet by mouth Daily 90 tablet 1    traZODone (DESYREL) 50 MG tablet Take 50 mg by mouth nightly as needed for Sleep      Omega-3 Fatty Acids (FISH OIL) 1000 MG CAPS Take 2 g by mouth      cetirizine (ZYRTEC) 10 MG tablet Take by mouth      Flaxseed, Linseed, 1000 MG CAPS Take  by mouth.  fish oil-omega-3 fatty acids 1000 MG capsule Take 1,600 mg by mouth daily       multivitamin (THERAGRAN) per tablet Take 1 tablet by mouth daily.  Cholecalciferol (VITAMIN D) 2000 units CAPS capsule Take 2,000 Units by mouth daily. No current facility-administered medications for this visit.       Allergies   Allergen Reactions    Prednisone      flushing     Family Status   Relation Name Status    Mother  Alive    Father      Brother  Alive   Chio smith  from sudden death   [address]      MGM      [address]  Alive    PGF      MGF  Alive        thyroid, gout    Brother         Review of Systems:  Constitutional: has fatigue, no fever, has recent weight gain, no recent weight loss, no changes in appetite  Eyes: no eye pain, no change in vision, no eye redness, no eye irritation, no double vision  Ears, nose, throat: no nasal congestion, no sore throat, no earache, no decrease in hearing, no hoarseness, no dry mouth, no sinus problems, no difficulty swallowing, has neck lumps, no dental problems, no mouth sores, no ringing in ears  Pulmonary: no shortness of breath, no wheezing, no dyspnea on exertion, no cough  Cardiovascular: no chest pain, no lower extremity edema, no orthopnea, no intermittent leg claudication, no palpitations  Gastrointestinal: no abdominal pain, no nausea, no vomiting, no diarrhea, no constipation, no dysphagia, no heartburn, no bloating  Genitourinary: no dysuria, no urinary incontinence, no urinary hesitancy, no urinary frequency, no feelings of urinary urgency, no nocturia  Musculoskeletal: no joint swelling, no joint stiffness, no joint pain, no muscle cramps, no muscle pain, no bone pain  Integument/Breast: has hair loss, no skin rashes, no skin lesions, no itching, no dry skin  Neurological: no numbness, no tingling, no weakness, no confusion, no headaches, no dizziness, no fainting, no tremors, no decrease in memory, no balance problems  Psychiatric: has anxiety, no depression, has insomnia  Hematologic/Lymphatic: no tendency for easy bleeding, no swollen lymph nodes, no tendency for easy bruising  Immunology: has seasonal allergies, no frequent infections, no frequent illnesses  Endocrine: no temperature intolerance    /86 (Site: Left Upper Arm, Position: Sitting, Cuff Size: Large Adult)   Pulse 74   Ht 5' 5.5\" (1.664 m)   Wt 235 lb 12.8 oz (107 kg)   SpO2 96%   BMI 38.64 kg/m²    Wt Readings from Last 3 Encounters:   01/17/20 235 lb 12.8 oz (107 kg)   01/14/20 236 lb (107 kg)   10/18/19 234 lb 9.6 oz (106.4 kg)     Body mass index is 38.64 kg/m².     OBJECTIVE:  Constitutional: no acute distress, well appearing and well nourished  Psychiatric: oriented to person, place and time, judgement and insight and normal, recent and remote memory and intact and mood and affect are normal  Skin: skin and subcutaneous tissue is normal without mass, normal turgor  Head and Face: examination of head and face revealed no abnormalities  Eyes: no lid or conjunctival swelling, erythema or discharge, pupils are normal, equal, round, reactive to light  Ears/Nose: external inspection of ears and nose revealed no abnormalities, hearing is grossly normal  Oropharynx/Mouth/Face: lips, tongue and gums are normal with no lesions, the voice quality was normal  Neck: neck is supple and symmetric, with midline trachea and no masses, thyroid is enlarged  Lymphatics: normal cervical lymph nodes, normal supraclavicular nodes  Pulmonary: no increased work of breathing or signs of respiratory distress, lungs are clear to auscultation  Cardiovascular: normal heart rate and rhythm, normal S1 and S2, no murmurs and pedal pulses and 2+ bilaterally, No

## 2020-03-09 ENCOUNTER — OFFICE VISIT (OUTPATIENT)
Dept: DERMATOLOGY | Age: 35
End: 2020-03-09
Payer: COMMERCIAL

## 2020-03-09 PROCEDURE — 99213 OFFICE O/P EST LOW 20 MIN: CPT | Performed by: DERMATOLOGY

## 2020-03-09 NOTE — PROGRESS NOTES
Social History     Socioeconomic History    Marital status:      Spouse name: Not on file    Number of children: Not on file    Years of education: Not on file    Highest education level: Not on file   Occupational History    Not on file   Social Needs    Financial resource strain: Not on file    Food insecurity     Worry: Not on file     Inability: Not on file    Transportation needs     Medical: Not on file     Non-medical: Not on file   Tobacco Use    Smoking status: Never Smoker    Smokeless tobacco: Never Used   Substance and Sexual Activity    Alcohol use: Yes     Comment: 1-2 per month on average    Drug use: No    Sexual activity: Yes   Lifestyle    Physical activity     Days per week: Not on file     Minutes per session: Not on file    Stress: Not on file   Relationships    Social connections     Talks on phone: Not on file     Gets together: Not on file     Attends Episcopalian service: Not on file     Active member of club or organization: Not on file     Attends meetings of clubs or organizations: Not on file     Relationship status: Not on file    Intimate partner violence     Fear of current or ex partner: Not on file     Emotionally abused: Not on file     Physically abused: Not on file     Forced sexual activity: Not on file   Other Topics Concern    Not on file   Social History Narrative    Not on file       Physical Examination     The following were examined and determined to be normal: Psych/Neuro, Scalp/hair, Gums/teeth/lips, Neck, Breast/axilla/chest, Abdomen, Back, RUE, LUE, RLE, LLE and Nails/digits. buttocks. Areas covered by underwear garment(s) not examined. Does see OBGYN regularly     The following were examined and determined to be abnormal: Head/face and Conjunctivae/eyelids. [place]land phototype: 2    -General: A+Ox3, NAD, well-nourished, well-developed. Total body skin exam performed, areas examined listed above:   1.  Scattered on the head,neck, trunk and extremities are multiple well-defined round and oval symmetric smoothly-bordered uniformly brown macules and papules. no change in size/shape/color of any lesions; no bleeding lesions. 2. Right medial eyelid margin and left lateral eyelid margin- 2 discrete yellowish-white 2mm well circumscribed papules  3. Right lateral neck and chin- few linear excoriations, few hypopigmented scars to lower face and neck    Assessment and Plan     1. Multiple benign melanocytic nevi    2. Milia of eyelid, unspecified laterality    3. Neurotic excoriations        1. Multiple benign melanocytic nevi  Benign acquired melanocytic nevi  -Recommend monthly self skin exams   -Educated regarding the ABCDEs of melanoma detection   -Call for any new/changing moles or concerning lesions  -Reviewed sun protective behavior -- sun avoidance during the peak hours of the day, sun-protective clothing (including hat and sunglasses), sunscreen use (water resistant, broad spectrum, SPF at least 30, need for reapplication every 1.5 to 2 hours), avoidance of tanning beds   -Plan: Observation with annual skin checks (earlier if indicated) performed in office to monitor current nevi and to assess for new lesions. 2. Milia of eyelid, unspecified laterality  -Reassurance, benign small cyst.  No treatment is necessary   -Advised patient to seek care by ophthalmologist, [address] Her given his recommendation, if she would like them removed for cosmetic purposes    3. Neurotic excoriations  Patient has insight, states it is fairly well controlled. Avoid picking skin. Advised patient to seek care with PCP or psychiatrist if she would like greater control or if it worsens        Return to Clinic: 1 year skin exam   Discussed plan with patient and/or primary caretaker. Patient to call clinic with any questions / concerns. Reviewed proper use and side effects of treatment(s) and/or medication(s) with patient and/or primary caretaker.    MELISSA

## 2020-03-09 NOTE — PATIENT INSTRUCTIONS
Sun Protection Tips    Apply broad spectrum water resistant sunscreen with an SPF of at least 30 to exposed areas of the skin. Dont forget the ears and lips! Remember to reapply sunscreen about every 2 hours and after swimming or sweating. Wear sun protective clothing. Swim shirts (aka. rash guards) are a great idea and negates the need to reapply sunscreen in those areas. Seek the shade whenever possible especially between the hours of 10am and 4pm when the suns rays are the strongest.     Avoid tanning beds          Wear a wide brim hat while in the sun        Thanks for your visit! Feel free to send  Dr. Rosa Ybarra assistant, Janice, a [employer] message for any questions, concerns or  to schedule your cosmetic procedures.

## 2020-03-17 RX ORDER — LEVOTHYROXINE SODIUM 0.03 MG/1
TABLET ORAL
Qty: 90 TABLET | Refills: 1 | Status: SHIPPED | OUTPATIENT
Start: 2020-03-17 | End: 2020-12-20

## 2020-03-18 ENCOUNTER — E-VISIT (OUTPATIENT)
Dept: FAMILY MEDICINE CLINIC | Age: 35
End: 2020-03-18
Payer: COMMERCIAL

## 2020-03-18 PROCEDURE — 99422 OL DIG E/M SVC 11-20 MIN: CPT | Performed by: FAMILY MEDICINE

## 2020-03-18 RX ORDER — TOBRAMYCIN 3 MG/ML
1 SOLUTION/ DROPS OPHTHALMIC EVERY 4 HOURS
Qty: 5 ML | Refills: 0 | Status: SHIPPED | OUTPATIENT
Start: 2020-03-18 | End: 2020-03-28

## 2020-04-02 RX ORDER — TRAZODONE HYDROCHLORIDE 50 MG/1
50 TABLET ORAL NIGHTLY PRN
Qty: 30 TABLET | Refills: 1 | Status: SHIPPED | OUTPATIENT
Start: 2020-04-02 | End: 2020-04-28

## 2020-04-17 ENCOUNTER — VIRTUAL VISIT (OUTPATIENT)
Dept: ENDOCRINOLOGY | Age: 35
End: 2020-04-17
Payer: COMMERCIAL

## 2020-04-17 PROCEDURE — 99214 OFFICE O/P EST MOD 30 MIN: CPT | Performed by: INTERNAL MEDICINE

## 2020-04-17 RX ORDER — ESOMEPRAZOLE MAGNESIUM 20 MG
20 CAPSULE,DELAYED RELEASE (ENTERIC COATED) ORAL
COMMUNITY
End: 2022-03-30 | Stop reason: ALTCHOICE

## 2020-04-17 NOTE — PROGRESS NOTES
Medications   Medication Sig Dispense Refill    esomeprazole (NEXIUM) 20 MG delayed release capsule Take 20 mg by mouth every morning (before breakfast)      traZODone (DESYREL) 50 MG tablet Take 1 tablet by mouth nightly as needed for Sleep 30 tablet 1    levothyroxine (SYNTHROID) 25 MCG tablet TAKE 1 TABLET BY MOUTH EVERY DAY 90 tablet 1    Omega-3 Fatty Acids (FISH OIL) 1000 MG CAPS Take 2 g by mouth      cetirizine (ZYRTEC) 10 MG tablet Take by mouth      Flaxseed, Linseed, 1000 MG CAPS Take  by mouth.  multivitamin (THERAGRAN) per tablet Take 1 tablet by mouth daily.  Cholecalciferol (VITAMIN D) 2000 units CAPS capsule Take 2,000 Units by mouth daily.  fish oil-omega-3 fatty acids 1000 MG capsule Take 1,600 mg by mouth daily        No current facility-administered medications for this visit.       Allergies   Allergen Reactions    Prednisone      flushing     Family Status   Relation Name Status    Mother  Alive    Father      Brother  Alive   Wiliam Betancourt        one  from sudden death   [address]      MGM      [facility]  Alive    PGF      MGF  Alive        thyroid, gout    Brother         Review of Systems:  Constitutional: has fatigue, no fever, has recent weight gain, no recent weight loss, no changes in appetite  Eyes: no eye pain, no change in vision, no eye redness, no eye irritation, no double vision  Ears, nose, throat: no nasal congestion, no sore throat, no earache, no decrease in hearing, no hoarseness, no dry mouth, no sinus problems, no difficulty swallowing, has neck lumps, no dental problems, no mouth sores, no ringing in ears  Pulmonary: no shortness of breath, no wheezing, no dyspnea on exertion, no cough  Cardiovascular: no chest pain, no lower extremity edema, no orthopnea, no intermittent leg claudication, no palpitations  Gastrointestinal: no abdominal pain, no nausea, no vomiting, no diarrhea, no constipation, no dysphagia, no heartburn, no bloating  Genitourinary: no dysuria, no urinary incontinence, no urinary hesitancy, no urinary frequency, no feelings of urinary urgency, no nocturia  Musculoskeletal: no joint swelling, no joint stiffness, no joint pain, no muscle cramps, no muscle pain, no bone pain  Integument/Breast: has hair loss, no skin rashes, no skin lesions, no itching, no dry skin  Neurological: no numbness, no tingling, no weakness, no confusion, no headaches, no dizziness, no fainting, no tremors, no decrease in memory, no balance problems  Psychiatric: has anxiety, no depression, has insomnia  Hematologic/Lymphatic: no tendency for easy bleeding, no swollen lymph nodes, no tendency for easy bruising  Immunology: has seasonal allergies, no frequent infections, no frequent illnesses  Endocrine: no temperature intolerance    Wt 233 lbs  Wt Readings from Last 3 Encounters:   01/17/20 235 lb 12.8 oz (107 kg)   01/14/20 236 lb (107 kg)   10/18/19 234 lb 9.6 oz (106.4 kg)       OBJECTIVE:  Constitutional: no acute distress, well appearing and well nourished  Psychiatric: oriented to person, place and time, judgement and insight and normal, recent and remote memory and intact and mood and affect are normal  Skin: skin inspection appears normal  Head and Face: head and face inspection revealed no abnormalities  Eyes: no lid or conjunctival swelling, erythema or discharge  Ears/Nose: external inspection of ears and nose revealed no abnormalities, hearing is grossly normal  Oropharynx/Mouth/Face: lips are normal with no lesions, the voice quality was normal  Neck: neck is symmetric  Pulmonary: no increased work of breathing or signs of respiratory distress  Musculoskeletal: normal station   Neurological: normal general cortical function    Lab Review:    Lab Results   Component Value Date    WBC 5.8 08/24/2018    HGB 14.0 08/24/2018    HCT 42.6 08/24/2018    MCV 87.7 08/24/2018     08/24/2018     Lab Results in-person clinic visit. Return in about 3 months (around 7/17/2020) for thyroid problems.

## 2020-04-27 NOTE — TELEPHONE ENCOUNTER
Future Appointments   Date Time Provider Andrea Daigle   3/9/2021 10:15 AM Moe Madsen DO [facility] 1/14/2020

## 2020-04-28 RX ORDER — TRAZODONE HYDROCHLORIDE 50 MG/1
TABLET ORAL
Qty: 30 TABLET | Refills: 1 | Status: SHIPPED | OUTPATIENT
Start: 2020-04-28 | End: 2020-06-02

## 2020-06-02 RX ORDER — TRAZODONE HYDROCHLORIDE 50 MG/1
TABLET ORAL
Qty: 30 TABLET | Refills: 1 | Status: SHIPPED | OUTPATIENT
Start: 2020-06-02 | End: 2020-06-29

## 2020-06-02 NOTE — TELEPHONE ENCOUNTER
Future Appointments   Date Time Provider Andrea Daigle   3/9/2021 10:15 AM Genie Bustos DO [facility] 1/14/2020

## 2020-06-29 RX ORDER — TRAZODONE HYDROCHLORIDE 50 MG/1
TABLET ORAL
Qty: 30 TABLET | Refills: 1 | Status: SHIPPED | OUTPATIENT
Start: 2020-06-29 | End: 2020-07-28

## 2020-07-27 NOTE — TELEPHONE ENCOUNTER
Future Appointments   Date Time Provider Andrea Daigle   3/9/2021 10:15 AM Lyubov Persons, DO [facility]     [facility] Visit 3/18/2020

## 2020-07-28 RX ORDER — TRAZODONE HYDROCHLORIDE 50 MG/1
TABLET ORAL
Qty: 30 TABLET | Refills: 1 | Status: SHIPPED | OUTPATIENT
Start: 2020-07-28 | End: 2020-08-20

## 2020-08-07 ENCOUNTER — TELEPHONE (OUTPATIENT)
Dept: FAMILY MEDICINE CLINIC | Age: 35
End: 2020-08-07

## 2020-08-24 ENCOUNTER — E-VISIT (OUTPATIENT)
Dept: FAMILY MEDICINE CLINIC | Age: 35
End: 2020-08-24
Payer: COMMERCIAL

## 2020-08-24 PROCEDURE — 98970 NQHP OL DIG ASSMT&MGMT 5-10: CPT | Performed by: NURSE PRACTITIONER

## 2020-08-24 RX ORDER — FLUTICASONE PROPIONATE 50 MCG
1 SPRAY, SUSPENSION (ML) NASAL DAILY
Qty: 1 BOTTLE | Refills: 0 | Status: SHIPPED | OUTPATIENT
Start: 2020-08-24 | End: 2020-11-02

## 2020-08-24 ASSESSMENT — LIFESTYLE VARIABLES: SMOKING_STATUS: NO, I'VE NEVER SMOKED

## 2020-08-24 NOTE — PROGRESS NOTES
HPI: as per patient provided history  Exam: N/A (electronic visit)  ASSESSMENT/PLAN:  1. Viral sinusitis  - fluticasone (FLONASE) 50 MCG/ACT nasal spray; 1 spray by Nasal route daily  Dispense: 1 Bottle; Refill: 0      Patient instructed to call the office if worsens, or fails to improve as anticipated. 5-10 minutes were spent on the digital evaluation and management of this patient.

## 2020-09-03 DIAGNOSIS — E03.9 ACQUIRED HYPOTHYROIDISM: ICD-10-CM

## 2020-09-03 DIAGNOSIS — E78.2 MIXED HYPERLIPIDEMIA: ICD-10-CM

## 2020-09-03 DIAGNOSIS — R03.0 ELEVATED BLOOD PRESSURE READING WITHOUT DIAGNOSIS OF HYPERTENSION: ICD-10-CM

## 2020-09-03 DIAGNOSIS — R73.01 IFG (IMPAIRED FASTING GLUCOSE): ICD-10-CM

## 2020-09-03 DIAGNOSIS — E04.2 MULTINODULAR GOITER: ICD-10-CM

## 2020-09-03 DIAGNOSIS — E55.9 VITAMIN D DEFICIENCY: ICD-10-CM

## 2020-09-03 LAB
A/G RATIO: 1.7 (ref 1.1–2.2)
ALBUMIN SERPL-MCNC: 4.5 G/DL (ref 3.4–5)
ALP BLD-CCNC: 58 U/L (ref 40–129)
ALT SERPL-CCNC: 29 U/L (ref 10–40)
ANION GAP SERPL CALCULATED.3IONS-SCNC: 11 MMOL/L (ref 3–16)
AST SERPL-CCNC: 23 U/L (ref 15–37)
BASOPHILS ABSOLUTE: 0.1 K/UL (ref 0–0.2)
BASOPHILS RELATIVE PERCENT: 0.8 %
BILIRUB SERPL-MCNC: 0.4 MG/DL (ref 0–1)
BUN BLDV-MCNC: 9 MG/DL (ref 7–20)
CALCIUM SERPL-MCNC: 9.9 MG/DL (ref 8.3–10.6)
CHLORIDE BLD-SCNC: 101 MMOL/L (ref 99–110)
CHOLESTEROL, TOTAL: 253 MG/DL (ref 0–199)
CO2: 27 MMOL/L (ref 21–32)
CREAT SERPL-MCNC: 0.7 MG/DL (ref 0.6–1.1)
EOSINOPHILS ABSOLUTE: 0.2 K/UL (ref 0–0.6)
EOSINOPHILS RELATIVE PERCENT: 3.1 %
ESTIMATED AVERAGE GLUCOSE: 108.3 MG/DL
GFR AFRICAN AMERICAN: >60
GFR NON-AFRICAN AMERICAN: >60
GLOBULIN: 2.7 G/DL
GLUCOSE BLD-MCNC: 96 MG/DL (ref 70–99)
HBA1C MFR BLD: 5.4 %
HCT VFR BLD CALC: 40.9 % (ref 36–48)
HDLC SERPL-MCNC: 48 MG/DL (ref 40–60)
HEMOGLOBIN: 13.8 G/DL (ref 12–16)
LDL CHOLESTEROL CALCULATED: 179 MG/DL
LYMPHOCYTES ABSOLUTE: 2.3 K/UL (ref 1–5.1)
LYMPHOCYTES RELATIVE PERCENT: 33.1 %
MCH RBC QN AUTO: 29.6 PG (ref 26–34)
MCHC RBC AUTO-ENTMCNC: 33.7 G/DL (ref 31–36)
MCV RBC AUTO: 87.8 FL (ref 80–100)
MONOCYTES ABSOLUTE: 0.3 K/UL (ref 0–1.3)
MONOCYTES RELATIVE PERCENT: 5 %
NEUTROPHILS ABSOLUTE: 4 K/UL (ref 1.7–7.7)
NEUTROPHILS RELATIVE PERCENT: 58 %
PDW BLD-RTO: 12.5 % (ref 12.4–15.4)
PLATELET # BLD: 296 K/UL (ref 135–450)
PMV BLD AUTO: 7 FL (ref 5–10.5)
POTASSIUM SERPL-SCNC: 4.4 MMOL/L (ref 3.5–5.1)
RBC # BLD: 4.65 M/UL (ref 4–5.2)
SODIUM BLD-SCNC: 139 MMOL/L (ref 136–145)
T3 FREE: 3.2 PG/ML (ref 2.3–4.2)
T4 FREE: 1.2 NG/DL (ref 0.9–1.8)
TOTAL PROTEIN: 7.2 G/DL (ref 6.4–8.2)
TRIGL SERPL-MCNC: 130 MG/DL (ref 0–150)
TSH SERPL DL<=0.05 MIU/L-ACNC: 1.54 UIU/ML (ref 0.27–4.2)
VITAMIN D 25-HYDROXY: 62.3 NG/ML
VLDLC SERPL CALC-MCNC: 26 MG/DL
WBC # BLD: 6.9 K/UL (ref 4–11)

## 2020-09-10 ENCOUNTER — OFFICE VISIT (OUTPATIENT)
Dept: FAMILY MEDICINE CLINIC | Age: 35
End: 2020-09-10
Payer: COMMERCIAL

## 2020-09-10 VITALS
HEART RATE: 93 BPM | DIASTOLIC BLOOD PRESSURE: 78 MMHG | RESPIRATION RATE: 16 BRPM | TEMPERATURE: 98.8 F | HEIGHT: 66 IN | WEIGHT: 237 LBS | SYSTOLIC BLOOD PRESSURE: 120 MMHG | BODY MASS INDEX: 38.09 KG/M2 | OXYGEN SATURATION: 98 %

## 2020-09-10 PROCEDURE — 99395 PREV VISIT EST AGE 18-39: CPT | Performed by: FAMILY MEDICINE

## 2020-09-10 RX ORDER — LISINOPRIL 10 MG/1
10 TABLET ORAL DAILY
Qty: 30 TABLET | Refills: 5 | Status: SHIPPED | OUTPATIENT
Start: 2020-09-10 | End: 2020-09-11 | Stop reason: CLARIF

## 2020-09-10 ASSESSMENT — PATIENT HEALTH QUESTIONNAIRE - PHQ9
SUM OF ALL RESPONSES TO PHQ QUESTIONS 1-9: 0
2. FEELING DOWN, DEPRESSED OR HOPELESS: 0
SUM OF ALL RESPONSES TO PHQ9 QUESTIONS 1 & 2: 0
1. LITTLE INTEREST OR PLEASURE IN DOING THINGS: 0
SUM OF ALL RESPONSES TO PHQ QUESTIONS 1-9: 0

## 2020-09-10 ASSESSMENT — ENCOUNTER SYMPTOMS
BACK PAIN: 0
SHORTNESS OF BREATH: 0
ABDOMINAL PAIN: 0
COLOR CHANGE: 0

## 2020-09-10 NOTE — PROGRESS NOTES
Jose Pump  YOB: 1985    Date of Service:  9/10/2020    Chief Complaint:   Jose Pump is a 38 y.o. female who presents for complete physical examination.     HPI:  HPI    Hx abnormal PAP: no  Sexual activity: has sex with males   Self-breast exams: yes  Previous DEXA scan: no  Last eye exam: August 2019, abnormal - stigmatism (bilateral)  Exercise: aerobics 2 time(s) per week, walks 2 times a week  Seatbelt use: yes  Are You a Spiritual Person: no  Advance Directive: no    Wt Readings from Last 3 Encounters:   09/10/20 237 lb (107.5 kg)   01/17/20 235 lb 12.8 oz (107 kg)   01/14/20 236 lb (107 kg)     BP Readings from Last 3 Encounters:   09/10/20 120/78   01/17/20 127/86   01/14/20 112/86       Patient Active Problem List   Diagnosis    Multinodular goiter    Mixed hyperlipidemia    Vitamin D deficiency    Anxiety    Elevated blood pressure reading without diagnosis of hypertension    Allergic rhinitis    Psychophysiological insomnia    GDM (gestational diabetes mellitus), class A1    History of hyperthyroidism    Class 2 obesity with body mass index (BMI) of 38.0 to 38.9 in adult    Postpartum thyroiditis    Hashimoto's thyroiditis    Acquired hypothyroidism    IFG (impaired fasting glucose)       Health Maintenance   Topic Date Due    Varicella vaccine (1 of 2 - 2-dose childhood series) 05/10/1984    HIV screen  05/10/1998    Cervical cancer screen  12/04/2016    Flu vaccine (1) 09/01/2020    A1C test (Diabetic or Prediabetic)  09/03/2021    TSH testing  09/03/2021    DTaP/Tdap/Td vaccine (5 - Td) 03/01/2028    Hepatitis A vaccine  Aged Out    Hepatitis B vaccine  Aged Out    Hib vaccine  Aged Out    Meningococcal (ACWY) vaccine  Aged Out    Pneumococcal 0-64 years Vaccine  Aged [employer] History   Administered Date(s) Administered    HPV Quadrivalent (Gardasil) 09/02/2010    Influenza Vaccine, unspecified formulation 11/11/2016    Influenza Virus Vaccine 09/12/2018    Influenza, MDCK Randee Rogel, with preserv IM (Flucelvax 4 yrs and older) 12/31/2019    MMR 08/03/2016    PPD Test 06/29/2011    Tdap (Boostrix, Adacel) 05/02/2006, 07/01/2011, 05/01/2015, 03/01/2018       Allergies   Allergen Reactions    Prednisone      flushing     Outpatient Medications Marked as Taking for the 9/10/20 encounter (Office Visit) with Alo Velásquez DO   Medication Sig Dispense Refill    lisinopril (PRINIVIL;ZESTRIL) 10 MG tablet Take 1 tablet by mouth daily 30 tablet 5    fluticasone (FLONASE) 50 MCG/ACT nasal spray 1 spray by Nasal route daily 1 Bottle 0    traZODone (DESYREL) 50 MG tablet TAKE 1 TABLET BY MOUTH EVERY DAY AT BEDTIME AS NEEDED FOR SLEEP 30 tablet 2    esomeprazole (NEXIUM) 20 MG delayed release capsule Take 20 mg by mouth every morning (before breakfast)      levothyroxine (SYNTHROID) 25 MCG tablet TAKE 1 TABLET BY MOUTH EVERY DAY 90 tablet 1    cetirizine (ZYRTEC) 10 MG tablet Take by mouth      Flaxseed, Linseed, 1000 MG CAPS Take  by mouth.  fish oil-omega-3 fatty acids 1000 MG capsule Take 1,600 mg by mouth daily       multivitamin (THERAGRAN) per tablet Take 1 tablet by mouth daily.  Cholecalciferol (VITAMIN D) 2000 units CAPS capsule Take 2,000 Units by mouth daily.            Past Medical History:   Diagnosis Date    Acquired hypothyroidism 8/26/2018    Allergic rhinitis     Goiter, non-toxic     Hypertension     Hyperthyroidism 8/20/2018     Past Surgical History:   Procedure Laterality Date    CYST REMOVAL  07/18/2018    Scalp    WISDOM TOOTH EXTRACTION       Family History   Problem Relation Age of Onset    High Blood Pressure Mother     High Cholesterol Mother     High Blood Pressure Father     High Cholesterol Father     Heart Disease Father     High Cholesterol Brother     Heart Disease Paternal Aunt     Stroke Paternal Uncle     Heart Disease Paternal Uncle     Stroke Maternal Grandmother     Arthritis change and rash. Allergic/Immunologic: Positive for environmental allergies. Neurological: Negative for dizziness and headaches. Hematological: Does not bruise/bleed easily. Psychiatric/Behavioral: Negative for dysphoric mood. The patient is not nervous/anxious. PhysicalExam:   Vitals:    09/10/20 1358   BP: 120/78   Site: Left Upper Arm   Position: Sitting   Pulse: 93   Resp: 16   Temp: 98.8 °F (37.1 °C)   TempSrc: Temporal   SpO2: 98%   Weight: 237 lb (107.5 kg)   Height: 5' 6\" (1.676 m)     Body mass index is 38.25 kg/m². Physical Exam  Vitals signs reviewed. Constitutional:       Appearance: She is well-developed. HENT:      Head: Normocephalic and atraumatic. Right Ear: External ear normal.      Left Ear: External ear normal.      Nose: Nose normal.   Eyes:      Pupils: Pupils are equal, round, and reactive to light. Neck:      Musculoskeletal: Normal range of motion. Cardiovascular:      Rate and Rhythm: Normal rate and regular rhythm. Heart sounds: Normal heart sounds. Pulmonary:      Effort: Pulmonary effort is normal.      Breath sounds: Normal breath sounds. Abdominal:      General: Bowel sounds are normal.      Palpations: Abdomen is soft. Musculoskeletal: Normal range of motion. Skin:     General: Skin is warm and dry. Neurological:      Mental Status: She is alert and oriented to person, place, and time. Deep Tendon Reflexes: Reflexes are normal and symmetric. Psychiatric:         Behavior: Behavior normal.         Thought Content: Thought content normal.         Judgment: Judgment normal.         Assessment/Plan:   Diagnosis Orders   1. Annual physical exam     2. Elevated LDL cholesterol level  lisinopril (PRINIVIL;ZESTRIL) 10 MG tablet     Return in about 1 year (around 9/10/2021) for Physical Exam.    Prior to Visit Medications    Medication Sig Taking?  Authorizing Provider   lisinopril (PRINIVIL;ZESTRIL) 10 MG tablet Take 1 tablet by mouth daily Yes Celena Oropeza,    fluticasone (FLONASE) 50 MCG/ACT nasal spray 1 spray by Nasal route daily Yes CATY Russo CNP   traZODone (DESYREL) 50 MG tablet TAKE 1 TABLET BY MOUTH EVERY DAY AT BEDTIME AS NEEDED FOR SLEEP Yes Celena Oropeza, DO   esomeprazole (NEXIUM) 20 MG delayed release capsule Take 20 mg by mouth every morning (before breakfast) Yes Historical Provider, MD   levothyroxine (SYNTHROID) 25 MCG tablet TAKE 1 TABLET BY MOUTH EVERY DAY Yes Cheryl Davison MD   cetirizine (ZYRTEC) 10 MG tablet Take by mouth Yes Historical Provider, MD   Flaxseed, Linseed, 1000 MG CAPS Take  by mouth. Yes Historical Provider, MD   fish oil-omega-3 fatty acids 1000 MG capsule Take 1,600 mg by mouth daily  Yes Historical Provider, MD   multivitamin [facility]) per tablet Take 1 tablet by mouth daily. Yes Historical Provider, MD   Cholecalciferol (VITAMIN D) 2000 units CAPS capsule Take 2,000 Units by mouth daily.    Yes Historical Provider, MD

## 2020-09-11 ENCOUNTER — TELEPHONE (OUTPATIENT)
Dept: FAMILY MEDICINE CLINIC | Age: 35
End: 2020-09-11

## 2020-09-11 RX ORDER — ATORVASTATIN CALCIUM 10 MG/1
10 TABLET, FILM COATED ORAL DAILY
Qty: 30 TABLET | Refills: 3 | Status: SHIPPED | OUTPATIENT
Start: 2020-09-11 | End: 2021-01-04

## 2020-09-11 NOTE — TELEPHONE ENCOUNTER
Pt state she was supposed to get lipitor to ;lower cholesterol  She believes wrong medication was called in and wants to clairify why she got lisinopril (PRINIVIL;ZESTRIL) 10 MG tablet    Which she states is for high blood pressure .  Pt is requesting call back with clarification

## 2020-09-14 ENCOUNTER — NURSE TRIAGE (OUTPATIENT)
Dept: OTHER | Facility: CLINIC | Age: 35
End: 2020-09-14

## 2020-09-14 ENCOUNTER — TELEMEDICINE (OUTPATIENT)
Dept: FAMILY MEDICINE CLINIC | Age: 35
End: 2020-09-14
Payer: COMMERCIAL

## 2020-09-14 PROCEDURE — 99441 PR PHYS/QHP TELEPHONE EVALUATION 5-10 MIN: CPT | Performed by: NURSE PRACTITIONER

## 2020-09-14 RX ORDER — METHYLPREDNISOLONE 4 MG/1
TABLET ORAL
Qty: 1 KIT | Refills: 0 | Status: SHIPPED | OUTPATIENT
Start: 2020-09-14 | End: 2021-01-19 | Stop reason: SDUPTHER

## 2020-09-14 RX ORDER — ALBUTEROL SULFATE 90 UG/1
2 AEROSOL, METERED RESPIRATORY (INHALATION) EVERY 6 HOURS PRN
Qty: 1 INHALER | Refills: 3 | Status: SHIPPED | OUTPATIENT
Start: 2020-09-14 | End: 2021-08-05 | Stop reason: ALTCHOICE

## 2020-09-14 NOTE — TELEPHONE ENCOUNTER
Received call from [place], [address], [place]. Patient called in states her 8 y/o (cough) and 1 y/o (congestion, runny nose, ear infection) both were tested for COVID on 9//11, results negative on 9/12/20. Patient c/o chest tightness, intermittent cough, sinus congestion and clear runny nose, onset of symptoms 9/13/20, see triage assessment below. Care Advice provided; patient acknowledged understanding of care advice and is in agreement with plan. Call soft transferred to [address] [place], [place] [address] Floor to schedule appointment. Please do not reply to the triage nurse through this encounter. Any subsequent communication should be directly with the patient. Reason for Disposition   Patient wants to be seen    Answer Assessment - Initial Assessment Questions  1. ONSET: \"When did the cough begin? \"       9/13/20  2. SEVERITY: \"How bad is the cough today? \"       Mild dry cough  3. RESPIRATORY DISTRESS: \"Describe your breathing. \"       Normal but Painful to take a deep breath, feels tightness  4. FEVER: \"Do you have a fever? \" If so, ask: \"What is your temperature, how was it measured, and when did it start? \"     Denies  5. HEMOPTYSIS: \"Are you coughing up any blood? \" If so ask: \"How much? \" (flecks, streaks, tablespoons, etc.)      Denies  6. TREATMENT: \"What have you done so far to treat the cough? \" (e.g., meds, fluids, humidifier)      Robitussin DM at night and Flonase and Mucinex D in the morning  7. CARDIAC HISTORY: \"Do you have any history of heart disease? \" (e.g., heart attack, congestive heart failure)       High cholesterolf  8. LUNG HISTORY: \"Do you have any history of lung disease? \"  (e.g., pulmonary embolus, asthma, emphysema)      Denies  9. PE RISK FACTORS: \"Do you have a history of blood clots? \" (or: recent major surgery, recent prolonged travel, bedridden)      Denies  10.  OTHER SYMPTOMS: \"Do you have any other symptoms? (e.g., runny nose, wheezing, chest pain)       Clear runny nose, Chest pain with deep inspiration only; mild sinus congestion  11. PREGNANCY: \"Is there any chance you are pregnant? \" \"When was your last menstrual period? \"        No. LMP - IUD  12. TRAVEL: \"Have you traveled out of the country in the last month? \" (e.g., travel history, exposures)       Denies    Protocols used: [id number]

## 2020-09-24 ENCOUNTER — E-VISIT (OUTPATIENT)
Dept: FAMILY MEDICINE CLINIC | Age: 35
End: 2020-09-24

## 2020-09-24 ENCOUNTER — VIRTUAL VISIT (OUTPATIENT)
Dept: FAMILY MEDICINE CLINIC | Age: 35
End: 2020-09-24
Payer: COMMERCIAL

## 2020-09-24 PROCEDURE — 99213 OFFICE O/P EST LOW 20 MIN: CPT | Performed by: FAMILY MEDICINE

## 2020-09-24 RX ORDER — SULFAMETHOXAZOLE AND TRIMETHOPRIM 800; 160 MG/1; MG/1
1 TABLET ORAL 2 TIMES DAILY
Qty: 14 TABLET | Refills: 0 | Status: SHIPPED | OUTPATIENT
Start: 2020-09-24 | End: 2020-10-01

## 2020-09-24 NOTE — PROGRESS NOTES
D) 2000 units CAPS capsule Take 2,000 Units by mouth daily. Yes Historical Provider, MD       Social History     Tobacco Use    Smoking status: Never Smoker    Smokeless tobacco: Never Used   Substance Use Topics    Alcohol use: Yes     Comment: 1-2 per month on average    Drug use: No        Allergies   Allergen Reactions    Prednisone      flushing   ,   Past Medical History:   Diagnosis Date    Acquired hypothyroidism 8/26/2018    Allergic rhinitis     Goiter, non-toxic     Hypertension     Hyperthyroidism 8/20/2018   ,   Past Surgical History:   Procedure Laterality Date    CYST REMOVAL  07/18/2018    Scalp    WISDOM TOOTH EXTRACTION         PHYSICAL EXAMINATION:  [ INSTRUCTIONS:  \"[x]\" Indicates a positive item  \"[]\" Indicates a negative item  -- DELETE ALL ITEMS NOT EXAMINED]  Vital Signs: (As obtained by patient/caregiver or practitioner observation)    Height - 5' 6\" Weight - 236 lb :MP - has IUD     Constitutional: [x] Appears well-developed and well-nourished [x] No apparent distress      [] Abnormal-   Mental status  [x] Alert and awake  [x] Oriented to person/place/time [x]Able to follow commands      Eyes:  EOM    [x]  Normal  [] Abnormal-  Sclera  []  Normal  [] Abnormal -         Discharge []  None visible  [] Abnormal -    HENT:   [] Normocephalic, atraumatic.   [] Abnormal   [x] Mouth/Throat: Mucous membranes are moist.     External Ears [x] Normal  [] Abnormal-     Neck: [x] No visualized mass     Pulmonary/Chest: [x] Respiratory effort normal.  [x] No visualized signs of difficulty breathing or respiratory distress        [] Abnormal-      Musculoskeletal:   [] Normal gait with no signs of ataxia         [x] Normal range of motion of neck        [] Abnormal-       Neurological:        [x] No Facial Asymmetry (Cranial nerve 7 motor function) (limited exam to video visit)          [x] No gaze palsy        [] Abnormal-         Skin:        [] No significant exanthematous lesions or discoloration noted on facial skin         [x] Abnormal- mild erythema at distal left index finger with dark red/black under distal nail, negative for discharge or visible nail/nailbed damage           Psychiatric:       [x] Normal Affect [] No Hallucinations        [] Abnormal-     Other pertinent observable physical exam findings-     ASSESSMENT/PLAN:  1. [facility]  - Bactrim DS BID x  7days    Return if symptoms worsen or fail to improve. Sarrah Siemens is a 38 y.o. female being evaluated by a Virtual Visit (video visit) encounter to address concerns as mentioned above. A caregiver was present when appropriate. Due to this being a TeleHealth encounter ([facility]), evaluation of the following organ systems was limited: Vitals/Constitutional/EENT/Resp/CV/GI//MS/Neuro/Skin/Heme-Lymph-Imm. Pursuant to the emergency declaration under the [address] [place] [address] and the [employer] Act, this Virtual Visit was conducted with patient's (and/or legal guardian's) consent, to reduce the patient's risk of exposure to COVID-19 and provide necessary medical care. The patient (and/or legal guardian) has also been advised to contact this office for worsening conditions or problems, and seek emergency medical treatment and/or call 911 if deemed necessary. Patient identification was verified at the start of the visit: Yes    Total time spent on this encounter: Not billed by time    Services were provided through a video synchronous discussion virtually to substitute for in-person clinic visit. Patient and provider were located at their individual homes. --El Dunlap DO on 9/24/2020 at 10:51 AM    An electronic signature was used to authenticate this note.

## 2020-10-01 ENCOUNTER — VIRTUAL VISIT (OUTPATIENT)
Dept: ENDOCRINOLOGY | Age: 35
End: 2020-10-01
Payer: COMMERCIAL

## 2020-10-01 PROCEDURE — 99214 OFFICE O/P EST MOD 30 MIN: CPT | Performed by: INTERNAL MEDICINE

## 2020-10-01 NOTE — PROGRESS NOTES
SUBJECTIVE:  Dung Reyes is a 38 y.o. female who is here for a hyperthyroidism, multinodular goiter. 1. Hypothyroidism     This started in 2001. Patient was diagnosed with MNG. The problem has been worsening. Previous thyroid studies include: TSH and free thyroxine. On levothyroxine 0.025 mg daily.     Current complaints: fatigue, hair loss. Hair loss consistent, same  Fatigue minimal  Had a lot of stress, father, brother passed away      2. Multinodular goiter     In 2001 she was diagnosed with 1.5 cm cystic nodule. Repeated US - right 1.4 cm, right superior 1.7 cm, left subcm nodules. Dr. Tyler Fernandez follows. History of obstructive symptoms: difficulty swallowing No, changes in voice/hoarseness No.  History of radiation to patient's neck: No  Resent iodine exposure: No  Family history includes thyroid abnormalities. Family history of thyroid cancer: No     3. Obesity  Eats moderately healthy.     2. History of Hyperthyroidism     No diarrhea. Hair loss postpartum. Calcium 10.6, ULN 10.5. Had son born 3/28/2018. Has another boy 5yo  Has Mary implant for birth control. Had short periods since 3/2018.     5. Hashimoto's thyroiditis  Has fatigue.     6. Postpartum thyroiditis  Has fatigue.     Thyroid sono copy from Dr. Tyler Fernandez note: The patient was placed in a semi-recumbent position with mild neck extension. Real time B-mode ultrasound on the neck was performed in transverse/ axial and longitudinal/ sagittal planes. Stable heterogenous MNG with cyto nondiagnostic R inf predominately cystic 1.1cm (prior1.4cm) with R sup solid 1.5cm (prior 1.7cm) and L inf solid hypo 1.1cm without suspicious features. Bilateral infrathyroid LNs benign appearing likely reactive. The patient tolerated the procedure well and without side effects.     7. IFG  Glucose 102     8. Vitamin D deficiency  Has fatigue     9.  Hyperlipidemia  Father had MI at age 46  No HTN, diabetes  No smoking     Name: Danial Hernandez :  1985   [address] [place] [address]. Staff: Meenakshi Suarez [id number]    Verified By: Terrie SANZ         Samm: 09  11:51 am  Exams:  US-THYROID/NECK/HEAD      Thyroid ultrasound on 2009    History: Thyroid nodule    Comparison: None    Findings: The right lobe of the gland measures greater than 5 cm in  craniocaudal dimension. It extends beyond the confines of the  transducer. Transversely, it measures 2.4 x 2.5 cm. The left  lobe measures 4.8 x 2.4 x 1.6 cm. The thyroid demonstrates  diffusely heterogeneous echogenicity. There is coarsening of the  echotexture. Within the right lobe, there is a 1.3 x 0.7 x 0.9  cm cyst. There is also a hypoechoic nodule measuring 1.2 x 0.9 x  0.7 cm. The remainder of the gland is diffusely heterogeneous  and may contain additional small hypoechoic nodules. Impression:    Diffusely heterogeneous thyroid gland. There is a discrete cyst  and a discrete nodule seen within the right lobe as described  above.   * end of result *   Status         Past Medical History:   Diagnosis Date    Acquired hypothyroidism 2018    Allergic rhinitis     Goiter, non-toxic     Hypertension     Hyperthyroidism 2018     Patient Active Problem List    Diagnosis Date Noted    IFG (impaired fasting glucose) 2020    Postpartum thyroiditis 2018    Hashimoto's thyroiditis 2018    Acquired hypothyroidism 2018    History of hyperthyroidism 2018    Class 2 obesity with body mass index (BMI) of 38.0 to 38.9 in adult 2018    GDM (gestational diabetes mellitus), class A1 01/10/2018    Psychophysiological insomnia 2017    Allergic rhinitis 2016    Multinodular goiter 2011    Mixed hyperlipidemia 2011    Vitamin D deficiency 2011    Anxiety 2011    Elevated blood pressure reading without diagnosis of hypertension 2011     Past Surgical History:   Procedure Laterality Date    CYST REMOVAL  07/18/2018    Scalp    WISDOM TOOTH EXTRACTION       Family History   Problem Relation Age of Onset    High Blood Pressure Mother     High Cholesterol Mother     High Blood Pressure Father     High Cholesterol Father     Heart Disease Father     High Cholesterol Brother     Heart Disease Paternal Aunt     Stroke Paternal Uncle     Heart Disease Paternal Uncle     Stroke Maternal Grandmother     Arthritis Paternal Grandmother     Heart Disease Paternal Grandfather      Social History     Socioeconomic History    Marital status:      Spouse name: None    Number of children: None    Years of education: None    Highest education level: None   Occupational History    None   Social Needs    Financial resource strain: None    Food insecurity     Worry: None     Inability: None    Transportation needs     Medical: None     Non-medical: None   Tobacco Use    Smoking status: Never Smoker    Smokeless tobacco: Never Used   Substance and Sexual Activity    Alcohol use: Yes     Comment: 1-2 per month on average    Drug use: No    Sexual activity: Yes   Lifestyle    Physical activity     Days per week: None     Minutes per session: None    Stress: None   Relationships    Social connections     Talks on phone: None     Gets together: None     Attends Alevism service: None     Active member of club or organization: None     Attends meetings of clubs or organizations: None     Relationship status: None    Intimate partner violence     Fear of current or ex partner: None     Emotionally abused: None     Physically abused: None     Forced sexual activity: None   Other Topics Concern    None   Social History Narrative    None     Current Outpatient Medications   Medication Sig Dispense Refill    sulfamethoxazole-trimethoprim (BACTRIM DS) 800-160 MG per tablet Take 1 tablet by mouth 2 times daily for 7 days 14 tablet 0    albuterol sulfate HFA (PROAIR HFA) 108 (90 Base) MCG/ACT inhaler Inhale 2 puffs into the lungs every 6 hours as needed for Wheezing 1 Inhaler 3    atorvastatin (LIPITOR) 10 MG tablet Take 1 tablet by mouth daily 30 tablet 3    fluticasone (FLONASE) 50 MCG/ACT nasal spray 1 spray by Nasal route daily 1 Bottle 0    traZODone (DESYREL) 50 MG tablet TAKE 1 TABLET BY MOUTH EVERY DAY AT BEDTIME AS NEEDED FOR SLEEP 30 tablet 2    esomeprazole (NEXIUM) 20 MG delayed release capsule Take 20 mg by mouth every morning (before breakfast)      levothyroxine (SYNTHROID) 25 MCG tablet TAKE 1 TABLET BY MOUTH EVERY DAY 90 tablet 1    cetirizine (ZYRTEC) 10 MG tablet Take by mouth      Flaxseed, Linseed, 1000 MG CAPS Take  by mouth.  fish oil-omega-3 fatty acids 1000 MG capsule Take 1,600 mg by mouth daily       multivitamin (THERAGRAN) per tablet Take 1 tablet by mouth daily.  Cholecalciferol (VITAMIN D) 2000 units CAPS capsule Take 2,000 Units by mouth daily. No current facility-administered medications for this visit.       Allergies   Allergen Reactions    Prednisone      flushing     Family Status   Relation Name Status    Mother  Alive    Father      Brother  Alive   Sarbjit Shirley        one  from sudden death   Samina Her [address]      MGM      [facility]  Alive    PGF      MGF  Alive        thyroid, gout    Brother         Review of Systems:  Constitutional: has fatigue, no fever, has recent weight gain, no recent weight loss, no changes in appetite  Eyes: no eye pain, no change in vision, no eye redness, no eye irritation, no double vision  Ears, nose, throat: no nasal congestion, no sore throat, no earache, no decrease in hearing, no hoarseness, no dry mouth, no sinus problems, no difficulty swallowing, has neck lumps, no dental problems, no mouth sores, no ringing in ears  Pulmonary: no shortness of breath, no wheezing, no dyspnea on exertion, no cough  Cardiovascular: no [id number]  Lab Results   Component Value Date    VITD25 62.3 09/03/2020        ASSESSMENT/PLAN:    1. Hypothyroidism  Call in 3 months for TSH  TSH 1.54  [id number]  TSH 4.63-3.42  TSH 1.6 on 0.025 mg, TSH-0.69 on 0.05 mg  Levothyroxine 0.025 mg. Follow TSH     2.  Obesity   Diet, exercise. Tried HCG diet, weight watchers, low carb.     3. Multinodular goiter  Follow sonogram with Dr. Nallely Gao. Stable heterogenous MNG with cyto nondiagnostic R inf predominately cystic 1.1cm (prior1.4cm) with R sup solid 1.5cm (prior 1.7cm) and L inf solid hypo 1.1cm without suspicious features. Bilateral infrathyroid LNs benign appearing likely reactive. 4. Hashimoto's thyroiditis  Follow TSH.     5. Postpartum thyroiditis  Follow TSH.     6. History of Hyperthyroidism  - T3, Free; Future  - T4, Free; Future  - TSH without Reflex; Future     7. IFG   Hemoglobin A1c 5.4  Patient has history of gestational diabetes  Glucose 102-88-96  Hx of gestational diabetes. Prediabetes education     8. Vitamin D deficiency  25OHVitamin D 49.8-62.3  Continue vit D 2000 IU daily     9. Hyperlipidemia  Started atorvastatin per PCP  - lipid panel  - Diet, exercise  -179  Uncontrolled  Worsening    Reviewed and/or ordered clinical lab results Yes  Reviewed and/or ordered radiology tests Yes   Reviewed and/or ordered other diagnostic tests No  Discussed test results with performing physician No  Independently reviewed image, tracing, or specimen No  Made a decision to obtain old records No  Reviewed and summarized old records Yes  In 2001 she was diagnosed with 1.5 cm cystic nodule. Repeated US - right 1.4 cm, right superior 1.7 cm, left subcm nodules.   TSH 7/21/2018 <0.01  TSH 7/2016 2.36  TSH 8/2018 6.12  TSH 10/14/2018 4.63  11/7/2018 TSH 3.42  Obtained history from other than patient No    Han Waller was counseled regarding symptoms of thyroid diagnosis, course and complications of disease if inadequately treated, side effects of medications, diagnosis, treatment options, and prognosis, risks, benefits, complications, and alternatives of treatment, labs, imaging and other studies and treatment targets and goals, thyorid nodules, hyperthyroidism causes treatment. .  She understands instructions and counseling. Return in about 6 months (around 4/1/2021) for thyroid problems.

## 2020-11-02 RX ORDER — FLUTICASONE PROPIONATE 50 MCG
SPRAY, SUSPENSION (ML) NASAL
Qty: 1 BOTTLE | Refills: 0 | Status: SHIPPED | OUTPATIENT
Start: 2020-11-02 | End: 2021-01-02

## 2020-11-02 NOTE — TELEPHONE ENCOUNTER
Future Appointments   Date Time Provider Andrea Daigle   3/9/2021 10:15 AM Eh Celaya [facility]     9/24/2020 LOV

## 2020-11-16 RX ORDER — TRAZODONE HYDROCHLORIDE 50 MG/1
TABLET ORAL
Qty: 90 TABLET | Refills: 0 | Status: SHIPPED | OUTPATIENT
Start: 2020-11-16 | End: 2021-02-19

## 2020-11-16 NOTE — TELEPHONE ENCOUNTER
Future Appointments   Date Time Provider Andrea Daigle   3/9/2021 10:15 AM Hayden Jovel DO [facility] 9/24/2020

## 2020-12-16 NOTE — TELEPHONE ENCOUNTER
Requested Prescriptions     Pending Prescriptions Disp Refills    levothyroxine (SYNTHROID) 25 MCG tablet [Pharmacy Med Name: LEVOTHYROXINE 25 MCG TABLET] 90 tablet 1     Sig: TAKE 1 TABLET BY MOUTH EVERY DAY       LAST OV: 10/1/2020  LAST REFILL: 3/17/2020  NEXT OV: N/A

## 2020-12-20 RX ORDER — LEVOTHYROXINE SODIUM 0.03 MG/1
TABLET ORAL
Qty: 90 TABLET | Refills: 0 | Status: SHIPPED | OUTPATIENT
Start: 2020-12-20 | End: 2021-03-15

## 2020-12-21 ENCOUNTER — TELEPHONE (OUTPATIENT)
Dept: ENDOCRINOLOGY | Age: 35
End: 2020-12-21

## 2020-12-21 RX ORDER — LEVOTHYROXINE SODIUM 0.03 MG/1
TABLET ORAL
Qty: 90 TABLET | Refills: 1 | OUTPATIENT
Start: 2020-12-21

## 2020-12-21 NOTE — TELEPHONE ENCOUNTER
I informed patient that prescription was sent to pharmacy yesterday at 11 PM and was confirmed as received. Most likely the duplicate was denied. If pharmacy does not have medication for any reason, we will resent prescription again.

## 2021-01-03 DIAGNOSIS — E78.00 ELEVATED LDL CHOLESTEROL LEVEL: ICD-10-CM

## 2021-01-04 RX ORDER — ATORVASTATIN CALCIUM 10 MG/1
TABLET, FILM COATED ORAL
Qty: 90 TABLET | Refills: 1 | Status: SHIPPED | OUTPATIENT
Start: 2021-01-04 | End: 2021-06-21

## 2021-01-04 NOTE — TELEPHONE ENCOUNTER
Future Appointments   Date Time Provider Andrea Daigle   3/9/2021 10:15 AM Anita Soriano [facility]     LOV 9/24/2020 Patient : Hira Markham Age: 68 year old Sex: male   MRN: [id number] Encounter Date: 5/30/2019  E13/13     History     Chief Complaint   Patient presents with   • Foot Pain   • Dizziness     HPI  5/30/2019  3:04 PM Hira Markham is a 68 year old male with a h/o DM, CKD, and basal cell carcinoma of the skin who presents to the ED via EMS accompanied by his daughter for evaluation of of R foot and ankle pain that began when he fell on Tuesday (5/28/19). Pt reports hearing his foot \"pop\" upon falling. He was drinking [employer] drinks on Tuesday, and reports that his blood glucose level was \"in the 500's.\" He states that he was ambulating to the bathroom, when he suddenly felt dizzy and fell to the ground. Pt reports diarrhea on Tuesday (he states that this is normal for him after consuming \"sugary\" drinks), but he denies current dizziness, CP, SOB, nausea, vomiting, or urinary symptoms. He reports hitting his head upon falling, but he is not anticoagulated. His daughter reports that the pt also reported R knee pain yesterday. She states that the pt also fell on Friday (5/24/19), but did not report injuries at that time. On Wednesday (5/29/19), pt was able to lower his blood glucose level to 329. He also reports that 2 days PTA, he had a systolic blood pressure in the 60's, and when they retook it 2 hours later, it was in the 80's. His daughter reports that the pt fractured his L ankle approximately 10 years PTA, but pt does not follow with orthopedics. He is currently wearing a walking boot that he acquired after the L ankle fx. Pt declines pain medication at this time. There are no further complaints or modifying factors at this time.    PCP: Jesse Ziegler MD     Allergies   Allergen Reactions   • Penicillins      Reaction unknown to pt.       Current Discharge Medication List      Prior to Admission Medications    Details   furosemide (LASIX) 40 MG tablet Take 1 tablet by mouth daily. Pt needs to see  Dr Coats for future refills.  Qty: 90 tablet, Refills: 0      calcitRIOL (ROCALTROL) 0.25 MCG capsule Take 1 capsule by mouth daily. No refills until seen by Dr Coats  Qty: 90 capsule, Refills: 0      LANTUS SOLOSTAR 100 UNIT/ML pen-injector INJECT 60 UNITS SUBCUTANEOUSLY INTO THE SKIN EVERY MORNING.  Qty: 15 mL, Refills: 3      atorvastatin (LIPITOR) 80 MG tablet TAKE 1 TABLET BY MOUTH NIGHTLY.  Qty: 90 tablet, Refills: 1      metoPROLOL tartrate (LOPRESSOR) 50 MG tablet Take 1 tablet by mouth 2 times daily. Remind pt he needs to f/u with Dr Coats for future refills after the 2  Qty: 60 tablet, Refills: 2      metoPROLOL tartrate (LOPRESSOR) 50 MG tablet TAKE 1 TABLET BY MOUTH 2 TIMES DAILY.  Qty: 60 tablet, Refills: 1      amLODIPine (NORVASC) 10 MG tablet Take 1 tablet by mouth daily.  Qty: 90 tablet, Refills: 3      hydrALAZINE (APRESOLINE) 25 MG tablet TAKE 1 TABLET BY MOUTH 3 TIMES DAILY.  Qty: 270 tablet, Refills: 1      lisinopril-hydroCHLOROthiazide (PRINZIDE,ZESTORETIC) 20-12.5 MG per tablet TAKE 1 TABLET BY MOUTH 2 TIMES DAILY  Qty: 180 tablet, Refills: 3      LANTUS SOLOSTAR 100 UNIT/ML pen-injector INJECT 60 UNITS INTO THE SKIN EVERY MORNING.  Qty: 15 mL, Refills: 1      Insulin Pen Needle (PEN NEEDLES) 31G X 8 MM Misc BD Ultra Fine pen needles To use with Humalog quick pen three times a day for DM II E11.21  Qty: 100 each, Refills: 3      insulin lispro (HUMALOG KWIKPEN) 100 UNIT/ML injection Inject 10 Units into the skin 3 times daily (before meals).  Qty: 15 mL, Refills: 12      acetaminophen (TYLENOL) 500 MG tablet Take 1,000 mg by mouth nightly as needed for Pain.      aspirin 81 MG chewable tablet Chew 1 tablet by mouth daily.  Qty: 30 tablet, Refills: 0               Past Medical History:   Diagnosis Date   • Anxiety    • Atrial fibrillation (CMS/HCC)    • Cerebral infarction (CMS/HCC)    • Chronic kidney disease    • Colonoscopy refused 08/03/2015    refused 2/13/2017   • Colonoscopy refused  04/16/2018   • Essential (primary) hypertension    • High cholesterol    • Malignant neoplasm (CMS/HCC)     left kidney tumor removed; skin cancer   • Perineal abscess    • Peripheral neuropathy     secondary to diabetes   • Sleep apnea    • Type II diabetes mellitus with nephropathy (CMS/HCC) 01/01/2001       Past Surgical History:   Procedure Laterality Date   • DESTRUC RETINAL LESN,PHOTOCOAG  2004    Focal Retinal Laser both eyes   • ECHO HEART REST W/O COLOR FLOW & DOPPLER  10/16/12    LV EF 55%   • EYE SURGERY      bilateral cataracts and retinal laser   • LAPAROSCOPIC APPENDECTOMY  6/2016   • REMV CATARACT EXTRACAP INSERT LENS Left 05/06/15    Cataract Removal Lens Implant   • TUMOR REMOVAL      left kidney       Family History   Problem Relation Age of Onset   • Heart disease Father    • Diabetes Brother    • Diabetes Other        Social History     Tobacco Use   • Smoking status: Never Smoker   • Smokeless tobacco: Never Used   Substance Use Topics   • Alcohol use: No     Comment: rare   • Drug use: No       Review of Systems   Constitutional: Negative for chills and fever.   HENT: Negative for congestion, ear pain, rhinorrhea and sore throat.    Eyes: Negative for discharge and redness.   Respiratory: Negative for cough, chest tightness and shortness of breath.    Cardiovascular: Negative for chest pain and leg swelling.   Gastrointestinal: Positive for diarrhea (Pt reports that this is normal for him after consuming \"sugary drinks\"). Negative for abdominal pain, constipation, nausea and vomiting.   Genitourinary: Negative for difficulty urinating, dysuria and frequency.   Musculoskeletal:        Positive for R foot and ankle pain onset on Tuesday (5/28/19), when pt fell.  Positive for R knee pain.   Skin: Negative for rash.   Neurological: Positive for dizziness (Currently resolved). Negative for headaches.   Psychiatric/Behavioral: Negative.        Physical Exam     ED Triage Vitals [05/30/19 1316]   ED  Triage Vitals Group      Temp 98.1 °F (36.7 °C)      Pulse 71      Resp 18      BP 94/56      SpO2 98 %      EtCO2 mmHg       Height 6' 1\" (1.854 m)      Weight 264 lb (119.7 kg)      Weight Scale Used ED Stated       Physical Exam   Constitutional: He is oriented to person, place, and time. He appears well-developed and well-nourished.   HENT:   Head: Normocephalic and atraumatic.   Eyes: Conjunctivae and EOM are normal. Right eye exhibits no discharge. Left eye exhibits no discharge. No scleral icterus.   Neck: Normal range of motion. Neck supple.   NO Cpsine ttp.   Cardiovascular: Normal rate, regular rhythm, normal heart sounds and intact distal pulses.   Pulmonary/Chest: Effort normal and breath sounds normal. No respiratory distress. He has no wheezes.   Abdominal: Soft. Bowel sounds are normal. He exhibits no distension. There is no tenderness.   Musculoskeletal: Normal range of motion.        Right ankle: He exhibits ecchymosis (R medial ankle). Tenderness. Medial malleolus tenderness found.        Right foot: There is swelling.   R meial knee ttp without swelling or erythema. Ecchymosis and swelling overlying the R medial malleolus. Diffuse R foot swelling.    Neurological: He is alert and oriented to person, place, and time. GCS eye subscore is 4. GCS verbal subscore is 5. GCS motor subscore is 6.   Skin: Skin is warm and dry. No rash noted.   Psychiatric: He has a normal mood and affect.   Nursing note and vitals reviewed.      ED Course     Procedures    Lab Results     Results for orders placed or performed during the hospital encounter of 05/30/19   CBC & Auto Differential   Result Value Ref Range    WBC 9.5 4.2 - 11.0 K/mcL    RBC 3.49 (L) 4.50 - 5.90 mil/mcL    HGB 10.3 (L) 13.0 - 17.0 g/dL    HCT 30.2 (L) 39.0 - 51.0 %    MCV 86.5 78.0 - 100.0 fl    MCH 29.5 26.0 - 34.0 pg    MCHC 34.1 32.0 - 36.5 g/dL    RDW-CV 13.4 11.0 - 15.0 %     140 - 450 K/mcL    NRBC 0 0 /100 WBC    DIFF TYPE  AUTOMATED DIFFERENTIAL     Neutrophil 76 %    LYMPH 13 %    MONO 8 %    EOSIN 3 %    BASO 0 %    Percent Immature Granuloctyes 0 %    Absolute Neutrophil 7.2 1.8 - 7.7 K/mcL    Absolute Lymph 1.2 1.0 - 4.0 K/mcL    Absolute Mono 0.8 0.3 - 0.9 K/mcL    Absolute Eos 0.3 0.1 - 0.5 K/mcL    Absolute Baso 0.0 0.0 - 0.3 K/mcL    Absolute Immature Granulocytes 0.0 0 - 0.2 K/mcl   Urinalysis & Reflex Micro with Culture if Indicated   Result Value Ref Range    COLOR YELLOW YELLOW    APPEARANCE CLEAR     GLUCOSE(URINE) 500 (A) NEGATIVE mg/dL    BILIRUBIN NEGATIVE NEGATIVE    KETONES NEGATIVE NEGATIVE mg/dL    SPECIFIC GRAVITY 1.018 1.005 - 1.030    BLOOD NEGATIVE NEGATIVE    pH 5.0 5.0 - 7.0 Units    PROTEIN(URINE) TRACE (A) NEGATIVE mg/dL    UROBILINOGEN 0.2 0.0 - 1.0 mg/dL    NITRITE NEGATIVE NEGATIVE    LEUKOCYTE ESTERASE TRACE (A) NEGATIVE    SPECIMEN TYPE URINE, CLEAN CATCH/MIDSTREAM     Squamous EPI'S NONE SEEN 0 - 5 /hpf    RBC 6 to 10 0 - 2 /hpf    WBC 1 to 5 0 - 5 /hpf    BACTERIA NONE SEEN NONE SEEN /hpf    Hyaline Casts 1 to 5 0 - 5 /lpf   Comprehensive Metabolic Panel   Result Value Ref Range    Sodium 135 135 - 145 mmol/L    Potassium 3.9 3.4 - 5.1 mmol/L    Chloride 104 98 - 107 mmol/L    Carbon Dioxide 19 (L) 21 - 32 mmol/L    Anion Gap 16 10 - 20 mmol/L    Glucose 152 (H) 65 - 99 mg/dL     (H) 6 - 20 mg/dL    Creatinine 5.62 (H) 0.67 - 1.17 mg/dL    GFR Estimate,  11     GFR Estimate, Non African American 9     BUN/Creatinine Ratio 22 7 - 25    CALCIUM 9.1 8.4 - 10.2 mg/dL    TOTAL BILIRUBIN 0.5 0.2 - 1.0 mg/dL    AST/SGOT 7 <38 Units/L    ALT/SGPT 13 <64 Units/L    ALK PHOSPHATASE 89 45 - 117 Units/L    TOTAL PROTEIN 7.6 6.4 - 8.2 g/dL    Albumin 3.3 (L) 3.6 - 5.1 g/dL    GLOBULIN 4.3 (H) 2.0 - 4.0 g/dL    A/G Ratio, Serum 0.8 (L) 1.0 - 2.4   Chem 8 Panel - Point of Care   Result Value Ref Range    Sodium  135 - 145 mmol/L    Potassium POC 4.0 3.4 - 5.1 mmol/L    Chloride   98 - 107 mmol/L    CALCIUM IONIZED-POC 1.20 1.15 - 1.29 mmol/L    CO2 Total 16 (L) 19 - 24 mmol/L    GLUCOSE  (H) 65 - 99 mg/dL    BUN  (H) 6 - 20 mg/dL    HEMATOCRIT POC 32.0 (L) 39.0 - 51.0 %    Hemoglobin POC 10.9 (L) 13.0 - 17.0 g/dL    ANION GAP POC 20 mmol/L    Creatinine POC 6.20 (H) 0.67 - 1.17 mg/dL    Estimated GFR  (POC) 10     Estimated GFR Non-African American (POC) 8    Lactic Acid Venous - Point of Care   Result Value Ref Range    Lactic Acid Venous 1.3 <2.0 mmol/L   Troponin I - Point of Care   Result Value Ref Range    Troponin I POC <0.10 <0.10 ng/mL   Creatine Kinase   Result Value Ref Range    CPK 49 39 - 308 Units/L       EKG Results     EKG Interpretation  Rate: 75  Rhythm: atrial fibrillation   Abnormality:  No acute ischemic abnormality. No significant change from 6/3/16.    EKG interpreted by ED physician    Radiology Results     Imaging Results          CT Head Brain (Final result)  Result time 05/30/19 17:32:31    Final result                 Impression:    IMPRESSION:    1. No acute intracranial finding. Essentially stable examination.               Narrative:      CT HEAD WITHOUT IV CONTRAST    INDICATION: DIZZINESS.    TECHNIQUE: Head CT performed with 5 mm contiguous axial images from the  skull base to the vertex without IV contrast. Coronal and sagittal  reformats were obtained.    COMPARISON: 8/14/2014.    FINDINGS:           No evidence for scalp contusion, laceration or foreign body.    No abnormal intra- or extraaxial fluid collections or masses. No midline  shift or ventriculomegaly. No fractures.    Opacified right mid ethmoid air cell. The mastoid air cells are clear.  Orbital contents are unremarkable. There are no osteolytic or osteoblastic  lesions.    There is no evidence for an acute ischemic injury or hemorrhage.                                   XR Knee 4+ View Right (Final result)  Result time 05/30/19 16:59:02    Final result                  Impression:    IMPRESSION:  Fracture of the proximal fibular metadiaphysis with minimal  displacement. Tricompartment knee osteophytes. No knee joint effusion.  Vascular calcifications. MRI can be considered for evaluation of occult  injury or internal derangement, if necessary.               Narrative:    EXAM:  XR KNEE 4+ VW RIGHT    HISTORY:  injury, pain.    COMPARISONS:  None.                                 XR Ankle 3+ View Right (Final result)  Result time 05/30/19 14:16:35    Final result                 Impression:    IMPRESSION: Transverse fracture medial malleolus- nondisplaced.               Narrative:    EXAM: XR ANKLE 3+ VW RIGHT    DATE OF EXAM: 5/30/2019 2:00 PM.    COMPARISON: None.     CLINICAL INFORMATION: injury and pain.    FINDINGS: Images of the right ankle demonstrate a transverse fracture of  the medial malleolus. This is nondisplaced. No distinct additional  fractures apparent.    Ankle mortise is not widened.                               XR Foot 3+ View Right (Final result)  Result time 05/30/19 14:18:25    Final result                 Impression:    IMPRESSION:   1.  No acute fracture right foot.  2.  Nondisplaced fracture medial malleolus seen best on the ankle series.               Narrative:    XR FOOT 3+ VW RIGHT    CLINICAL HISTORY: Injury and pain.    COMPARISON: None    FINDINGS: Bones are demineralized.  Alignment is anatomic.  Nondisplaced  fracture through the medial malleolus seen better on ankle series.  Long  arch of the foot is maintained.  There is vascular calcifications in the  anterior posterior soft tissues..                                ED Medication Orders (From admission, onward)    Start Ordered     Status Ordering Provider    05/30/19 1725 05/30/19 1724  sodium chloride (NORMAL SALINE) 0.9 % bolus 500 mL  ONCE      Acknowledged BETSY MULLINS    05/30/19 1519 05/30/19 1518  sodium chloride (PF) 0.9 % injection 2 mL  (Capped IV)  ONCE      Acknowledged  BETSY MULLINS    05/30/19 1519 05/30/19 1518  sodium chloride (NORMAL SALINE) 0.9 % bolus 500 mL  ONCE      Last MAR action:  New Bag BETSY MULLINS    05/30/19 1517 05/30/19 1518  sodium chloride (PF) 0.9 % injection 2 mL  (Capped IV)  PRN      Acknowledged BETSY MULLINS               MDM  Vitals  Vitals:    05/30/19 1316 05/30/19 1523 05/30/19 1526 05/30/19 1600   BP: 94/56 103/64 101/55 114/56   Pulse: 71  71 70   Resp: 18  17    Temp: 98.1 °F (36.7 °C)      TempSrc: Oral      SpO2: 98%   98%   Weight: 119.7 kg      Height: 6' 1\" (1.854 m)          ED Course    3:13 PM I updated the pt on his ankle XR that showed a nondisplaced transverse fracture of his medial malleolus. Discussed the plan to add on a knee XR, head CT, and blood work.  The patient understands and agrees with the plan of care. All additional questions and concerns have been addressed at this time.     3:43 PM I spoke with Dr. Cardona's nurse, orthopedics, regarding the patient's presentation of R ankle and foot pain, and the ED work up. Pt's ankle XR shows evidence of a nondisplaced transverse fracture of his medial malleolus. She will have Dr. Cardona review the images and call me back.     4:03 PM I spoke with Dr. Dulce Acuna's nurse, who informed me that Dr. Cardona reviewed the images. I informed her that the pt came in with a walking boot that he had at home from a previous fx. She states that the pt should be splinted and remain non-weightbearing. Discussed the plan to staff message Dr. Cardona, and place him on consult if the pt is hospitalized, but if not he will see him next Thursday.    4:25 PM Pt is at XR. I updated the pt's daughter on my consultation with Dr. Cardona's office and on his recommendation to splint pt's RLE, and to keep him non-weightbearing. I also updated her on his elevated creatinine of 5.62, and on the plan to continue his care in the hospital. The patient's daughter understands and agrees with the plan  of care. All additional questions and concerns have been addressed at this time.     5:11 PM I rechecked the pt who is resting comfortably in bed. I updated him on my consultation with Dr. Cardona's office, and on the plan to splint him and keep him nonbearing. I updated him on his lab workup results including his creatinine of 5.62. I recommended further care in the hospital to hydrate him with IV fluids. The patient understands and agrees with the plan of care. All additional questions and concerns have been addressed at this time.     5:21 PM I spoke with Dr. Palacio, hospitalist, regarding the patient's presentation of R foot and ankle pain, and the ED work up. Pt's ankle XR result showed a nondisplaced transverse medial malleolus fx, and Dr. Cardona will consult. Pt also reports intermittent dizziness, and is hypotensive. We discussed his lab workup results including his elevated creatinine is 5.62. I informed him that the pt is not anticoagulated. Dr. Palacio agrees with the plan for further care of the pt.     5:25 PM I rechecked the pt who is resting comfortably in bed. I updated him on his knee XR result that showed a fracture of the proximal fibular metadiaphysis with minimal displacement. Discussed the plan to place a longer splint. The patient understands and agrees with the plan of care. All additional questions and concerns have been addressed at this time.       6:13 PM I assessed the splint applied to the affected extremity of Hira Markham and found it to be well positioned with a normal neurovascular exam of the affected extremity. Pt has normal color to his toes, and they are warm and well perfused. Pt has neuropathy at baseline.     [facility]  Critical Care time spent on this patient outside of billable procedures:  None    6:14 PM  Does this patient meet Severe Sepsis criteria by CMS SEP-1 definition?No       6:14 PM  Does this patient meet Septic Shock criteria by CMS SEP-1 definition?No       Clinical Impression:  ED Diagnoses        Final diagnoses    Acute on chronic renal insufficiency          Closed nondisplaced fracture of medial malleolus of right tibia, initial encounter          Closed fracture of proximal end of right fibula, unspecified fracture morphology, initial encounter                    Pt to be admitted to Dr. Palacio in stable condition.      I have reviewed the information recorded by the scribe for accuracy and agree with its contents.    ____________________________________________________________________    Claire Farley acting as a scribe for MD Dr. Dalia Martínez  Dictation # [id number]  Scribe: Claire Mayer MD  05/30/19 3275

## 2021-01-19 ENCOUNTER — VIRTUAL VISIT (OUTPATIENT)
Dept: FAMILY MEDICINE CLINIC | Age: 36
End: 2021-01-19
Payer: COMMERCIAL

## 2021-01-19 ENCOUNTER — NURSE TRIAGE (OUTPATIENT)
Dept: OTHER | Facility: CLINIC | Age: 36
End: 2021-01-19

## 2021-01-19 DIAGNOSIS — U07.1 COVID-19: ICD-10-CM

## 2021-01-19 DIAGNOSIS — J40 BRONCHITIS: Primary | ICD-10-CM

## 2021-01-19 PROCEDURE — 99213 OFFICE O/P EST LOW 20 MIN: CPT | Performed by: FAMILY MEDICINE

## 2021-01-19 RX ORDER — AZITHROMYCIN 250 MG/1
TABLET, FILM COATED ORAL
Qty: 1 PACKET | Refills: 0 | Status: SHIPPED | OUTPATIENT
Start: 2021-01-19 | End: 2021-08-05 | Stop reason: ALTCHOICE

## 2021-01-19 RX ORDER — METHYLPREDNISOLONE 4 MG/1
TABLET ORAL
Qty: 1 KIT | Refills: 0 | Status: SHIPPED | OUTPATIENT
Start: 2021-01-19 | End: 2021-01-25

## 2021-01-19 RX ORDER — DEXTROMETHORPHAN HYDROBROMIDE AND PROMETHAZINE HYDROCHLORIDE 15; 6.25 MG/5ML; MG/5ML
5 SYRUP ORAL 4 TIMES DAILY PRN
Qty: 180 ML | Refills: 2 | Status: SHIPPED | OUTPATIENT
Start: 2021-01-19 | End: 2021-08-05 | Stop reason: ALTCHOICE

## 2021-01-19 ASSESSMENT — ENCOUNTER SYMPTOMS
CONSTIPATION: 0
COUGH: 1
ABDOMINAL PAIN: 0
RHINORRHEA: 1
DIARRHEA: 0

## 2021-01-19 ASSESSMENT — PATIENT HEALTH QUESTIONNAIRE - PHQ9
SUM OF ALL RESPONSES TO PHQ QUESTIONS 1-9: 0
SUM OF ALL RESPONSES TO PHQ QUESTIONS 1-9: 0

## 2021-01-19 NOTE — PROGRESS NOTES
2021    TELEHEALTH EVALUATION -- Audio/Visual (During [facility]- public health emergency)    HPI:    Debara Aase (:  1985) has requested an audio/video evaluation for the following concern(s):    Covid, cough     sx started. Test on the   Has been home and taking fever meds and otc cold meds    HA-  Tylenol helps some. Using her albuterol MDI-  No real asthma but gets bronchitis. Review of Systems   Constitutional: Positive for appetite change (lost her appetite some). Fever: low grade fever. HENT: Positive for congestion and rhinorrhea. Sore throat: scratchy throat. Respiratory: Positive for cough. Sob with steps,  At rest or even ground no sob. Cough- not productive. Says occ breathing sounds like a crackle,   No edema    Has a pulse ox-  94-97    Gastrointestinal: Negative for abdominal pain, constipation and diarrhea. Genitourinary: Negative for menstrual problem (has IUD- not pregnant). Prior to Visit Medications    Medication Sig Taking? Authorizing Provider   atorvastatin (LIPITOR) 10 MG tablet TAKE 1 TABLET BY MOUTH EVERY DAY Yes Ayden Good, DO   fluticasone (FLONASE) 50 MCG/ACT nasal spray SPRAY 1 SPRAY INTO EACH NOSTRIL EVERY DAY Yes Steffi Benton, DO   levothyroxine (SYNTHROID) 25 MCG tablet TAKE 1 TABLET BY MOUTH EVERY DAY Yes Jose C Lynch MD   traZODone (DESYREL) 50 MG tablet TAKE 1 TABLET BY MOUTH EVERY DAY AT BEDTIME AS NEEDED FOR SLEEP Yes Steffi Benton,    albuterol sulfate HFA (PROAIR HFA) 108 (90 Base) MCG/ACT inhaler Inhale 2 puffs into the lungs every 6 hours as needed for Wheezing Yes CATY Acuna - CNP   esomeprazole (NEXIUM) 20 MG delayed release capsule Take 20 mg by mouth every morning (before breakfast) Yes Historical Provider, MD   cetirizine (ZYRTEC) 10 MG tablet Take by mouth Yes Historical Provider, MD   Flaxseed, Linseed, 1000 MG CAPS Take  by mouth.    Yes Historical Provider, MD fish oil-omega-3 fatty acids 1000 MG capsule Take 1,600 mg by mouth daily  Yes Historical Provider, MD   multivitamin [facility]) per tablet Take 1 tablet by mouth daily. Yes Historical Provider, MD   Cholecalciferol (VITAMIN D) 2000 units CAPS capsule Take 2,000 Units by mouth daily. Yes Historical Provider, MD       Social History     Tobacco Use    Smoking status: Never Smoker    Smokeless tobacco: Never Used   Substance Use Topics    Alcohol use: Yes     Comment: 1-2 per month on average    Drug use: No            PHYSICAL EXAMINATION:  [ INSTRUCTIONS:  \"[x]\" Indicates a positive item  \"[]\" Indicates a negative item  -- DELETE ALL ITEMS NOT EXAMINED]  Vital Signs: (As obtained by patient/caregiver or practitioner observation)    Blood pressure-  Heart rate-    Respiratory rate-    Temperature-  Pulse oximetry-     Constitutional: [x] Appears well-developed and well-nourished [] No apparent distress      [x] Abnormal-no acute distress. Mental status   [x] Alert and awake  [x] Oriented to person/place/time [x]Able to follow commands      Eyes:  EOM    [x]  Normal  [] Abnormal-  Sclera  []  Normal  [] Abnormal -         Discharge []  None visible  [] Abnormal -    HENT:   [x] Normocephalic, atraumatic.   [] Abnormal   [] Mouth/Throat: Mucous membranes are moist.     Nasal voice and congestionExternal Ears [] Normal  [] Abnormal-     Neck: [] No visualized mass     Pulmonary/Chest: [x] Respiratory effort normal.  [] No visualized signs of difficulty breathing or respiratory distress        [x] Abnormal-      Musculoskeletal:   [] Normal gait with no signs of ataxia         [] Normal range of motion of neck        [] Abnormal-       Neurological:        [x] No Facial Asymmetry (Cranial nerve 7 motor function) (limited exam to video visit)          [x] No gaze palsy        [] Abnormal-         Skin:        [] No significant exanthematous lesions or discoloration noted on facial skin         [] Abnormal- Psychiatric:       [x] Normal Affect [x] No Hallucinations        [] Abnormal-     Other pertinent observable physical exam findings-     ASSESSMENT/PLAN:    Bronchitis /lower respiratory infection  Sinus and upper respiratory congestion  COVID-19 infection      Timewise and symptom wise we will treat with steroid and antibiotic. Patient has sensitivity to prednisone but reportedly can take methylprednisolone. Feels steroids and antibiotics are appropriate at this time. Also augment her cough suppression. Patient is instructed in use of the medicine. Expectations. Follow in 5 days if not dramatically better. If develop shortness of breath follow-up sooner. Patricia Krueger is a 38 y.o. female being evaluated by a Virtual Visit (video visit) encounter to address concerns as mentioned above. A caregiver was present when appropriate. Due to this being a TeleHealth encounter (During [facility] public health emergency), evaluation of the following organ systems was limited: Vitals/Constitutional/EENT/Resp/CV/GI//MS/Neuro/Skin/Heme-Lymph-Imm. Pursuant to the emergency declaration under the [facility], [address] [place] [address] authority and the [employer] and [employer] Act, this Virtual Visit was conducted with patient's (and/or legal guardian's) consent, to reduce the patient's risk of exposure to COVID-19 and provide necessary medical care. The patient (and/or legal guardian) has also been advised to contact this office for worsening conditions or problems, and seek emergency medical treatment and/or call 911 if deemed necessary. Patient identification was verified at the start of the visit: Yes    Total time spent on this encounter: Not billed by time    Services were provided through a video synchronous discussion virtually to substitute for in-person clinic visit. Patient and provider were located at their individual homes.

## 2021-01-19 NOTE — TELEPHONE ENCOUNTER
Reason for Disposition   Fever present > 3 days (72 hours)   [1] Fever returns after gone for over 24 hours AND [2] symptoms worse or not improved    Answer Assessment - Initial Assessment Questions  1. LOCATION: \"Where does it hurt? \"       Headache is mostly in the front of the forehead    2. ONSET: \"When did the headache start? \" (Minutes, hours or days)       Began on 1-11-21, over one week since COVID diagnosis    3. PATTERN: \"Does the pain come and go, or has it been constant since it started? \"      Constant    4. SEVERITY: \"How bad is the pain? \" and \"What does it keep you from doing? \"  (e.g., Scale 1-10; mild, moderate, or severe)    - MILD (1-3): doesn't interfere with normal activities     - MODERATE (4-7): interferes with normal activities or awakens from sleep     - SEVERE (8-10): excruciating pain, unable to do any normal activities         Headache is a 7/10 at the worst    5. RECURRENT SYMPTOM: \"Have you ever had headaches before? \" If so, ask: \"When was the last time? \" and \"What happened that time? \"       No    6. CAUSE: \"What do you think is causing the headache? \"      ave you been diagnosed with migraine headaches? \" If so, ask: \"Is this headache similar? \"       COVID    8. HEAD INJURY: \"Has there been any recent injury to the head? \"       No    9. OTHER SYMPTOMS: \"Do you have any other symptoms? \" (fever, stiff neck, eye pain, sore throat, cold symptoms)      Fever 99,     10. PREGNANCY: \"Is there any chance you are pregnant? \" \"When was your last menstrual period? \"        NO LMP- has an IUD    Answer Assessment - Initial Assessment Questions  1. COVID-19 DIAGNOSIS: \"Who made your Coronavirus (COVID-19) diagnosis? \" \"Was it confirmed by a positive lab test?\" If not diagnosed by a HCP, ask \"Are there lots of cases (community spread) where you live? \" (See public health department website, if unsure)      COVID positive on 1-11-21    2.  COVID-19 EXPOSURE: \"Was there any known exposure to COVID before the symptoms began? \" CDC Definition of close contact: within 6 feet (2 meters) for a total of 15 minutes or more over a 24-hour period. Yes  was positive    3. ONSET: \"When did the COVID-19 symptoms start?\"       01/10/2021    4. WORST SYMPTOM: \"What is your worst symptom? \" (e.g., cough, fever, shortness of breath, muscle aches)      Headache and cough    5. COUGH: \"Do you have a cough? \" If so, ask: \"How bad is the cough? \"        Yes, dry and constant, when she coughs it makes her head hurt worse. 6. FEVER: \"Do you have a fever? \" If so, ask: \"What is your temperature, how was it measured, and when did it start? \"      Low grade 99.3-99.5 Taking Acetaminophen for the headache    7. RESPIRATORY STATUS: \"Describe your breathing? \" (e.g., shortness of breath, wheezing, unable to speak)       No SOB, difficulty breathing or wheezing    8. BETTER-SAME-WORSE: Verline Leyden you getting better, staying the same or getting worse compared to yesterday? \"  If getting worse, ask, \"In what way? \"      Feels that she is getting worse, feels worse with headache and body aches    9. HIGH RISK DISEASE: \"Do you have any chronic medical problems? \" (e.g., asthma, heart or lung disease, weak immune system, etc.)      None    10. PREGNANCY: \"Is there any chance you are pregnant? \" \"When was your last menstrual period? \"        NO, IUD in place  11. OTHER SYMPTOMS: \"Do you have any other symptoms? \"  (e.g., chills, fatigue, headache, loss of smell or taste, muscle pain, sore throat)        *No Answer*    Protocols used: HEADACHE-ADULT-[place], CORONAVIRUS (COVID-19)  DIAGNOSED OR SUSPECTED-ADULT-[place]    Patient called pre-service center [facility] ([facility] [place])  with red flag complaint. Brief description of triage:worsenig cough and headache    Triage indicates for patient to be seen by virtual visit. She is requesting virtual appt.     Care advice provided, patient verbalizes understanding; denies any other questions or concerns; instructed to call back for any new or worsening symptoms. Writer provided warm transfer to [place] at [facility] for appointment scheduling. Attention Provider: Thank you for allowing me to participate in the care of your patient. The patient was connected to triage in response to information provided to the ECC. Please do not respond through this encounter as the response is not directed to a shared pool.

## 2021-01-20 ENCOUNTER — TELEPHONE (OUTPATIENT)
Dept: FAMILY MEDICINE CLINIC | Age: 36
End: 2021-01-20

## 2021-01-20 NOTE — TELEPHONE ENCOUNTER
Pt had visit with Dr Stepan Soni yesterday regarding her Covid. She called back this morning and her pulse ox was 90-92 when she got up this morning. It has been averaging 92-94 in the mornings. When she is up and moving around it is higher but has not been consistent. She wants to know when she should be concerned. NO SOB. Please advise.

## 2021-01-22 NOTE — TELEPHONE ENCOUNTER
Patient notified, very upset that no response was given in 2 days when she was have breathing issues. She did go to urgent care yesterday. I apologized and told her  That I would forward the message.   Dr. Kayla Walker answered the message

## 2021-01-22 NOTE — TELEPHONE ENCOUNTER
Please let pt know that a pulse ox less than 90 or new/worsening sob are indications to be seen in the ED. Thank you.

## 2021-01-28 DIAGNOSIS — J32.9 VIRAL SINUSITIS: ICD-10-CM

## 2021-01-28 DIAGNOSIS — B97.89 VIRAL SINUSITIS: ICD-10-CM

## 2021-01-28 RX ORDER — FLUTICASONE PROPIONATE 50 MCG
SPRAY, SUSPENSION (ML) NASAL
Qty: 1 BOTTLE | Refills: 1 | Status: SHIPPED | OUTPATIENT
Start: 2021-01-28 | End: 2021-04-20

## 2021-01-28 NOTE — TELEPHONE ENCOUNTER
Future Appointments   Date Time Provider Andrea Daigle   3/9/2021 10:15 AM Kevan Strauss DO And Derm [facility] 1/19/2021

## 2021-03-09 ENCOUNTER — OFFICE VISIT (OUTPATIENT)
Dept: DERMATOLOGY | Age: 36
End: 2021-03-09
Payer: COMMERCIAL

## 2021-03-09 VITALS — TEMPERATURE: 98.6 F

## 2021-03-09 DIAGNOSIS — D22.9 MULTIPLE BENIGN NEVI: Primary | ICD-10-CM

## 2021-03-09 DIAGNOSIS — H02.829 MILIA OF EYELID, UNSPECIFIED LATERALITY: ICD-10-CM

## 2021-03-09 DIAGNOSIS — L68.0 HIRSUTISM: ICD-10-CM

## 2021-03-09 DIAGNOSIS — L30.9 DERMATITIS: ICD-10-CM

## 2021-03-09 PROCEDURE — 99213 OFFICE O/P EST LOW 20 MIN: CPT | Performed by: DERMATOLOGY

## 2021-03-09 RX ORDER — TRIAMCINOLONE ACETONIDE 0.25 MG/G
CREAM TOPICAL
Qty: 15 G | Refills: 1 | Status: SHIPPED | OUTPATIENT
Start: 2021-03-09 | End: 2022-03-30

## 2021-03-09 NOTE — PATIENT INSTRUCTIONS
Sun Protection Tips    Apply broad spectrum water resistant sunscreen with an SPF of at least 30 to exposed areas of the skin. Dont forget the ears and lips! Remember to reapply sunscreen about every 2 hours and after swimming or sweating. Wear sun protective clothing. Swim shirts (aka. rash guards) are a great idea and negates the need to reapply sunscreen in those areas. Seek the shade whenever possible especially between the hours of 10am and 4pm when the suns rays are the strongest.     Avoid tanning beds          Wear a wide brim hat while in the sun        Thanks for your visit! Feel free to send  Dr. Rustam Barahona assistant, Janice, a [employer] message/call for any questions, concerns or  to schedule your cosmetic procedures.

## 2021-03-09 NOTE — PROGRESS NOTES
[facility]) Dermatology  [place], [place], [facility]       Radha Pate  1985    38 y.o. female     Date of Visit: 3/9/2021    Chief Complaint:   Chief Complaint   Patient presents with    Skin Lesion     TBSE        I was asked to see this patient by Dr. Ly ref. provider found. History of Present Illness:  Radha Pate is a 38 y.o. female who presents with the chief complaint of the followin. Total-body skin exam for spots. Many year history of multiple nevi on the head/neck, trunk and extremities, all present for many years. Denies new moles. Denies moles changing in size, shape, color. None associated w/ pain, bleeding, pruritus. 2.  Patient complains of 2-3 white small bump to her upper eyelids that she finds cosmetically unappealing. Present for many months. Denies any pain, drainage, swelling, pain. 3.  Patient complains of a recurring rash to her left axilla for many months. States it comes and goes. Denies significant itching, denies burning or pain. Does use deodorant to both axillas daily. Prefers deodorant with more organic ingredients or all natural ingredients as she thinks these provide better protection against body odor than traditional deodorants. Denies a rash to other body folds. Denies a personal history of psoriasis. 4.  Patient states for several years she has developed dark hairs to her upper cutaneous lip, chin, inferior cheeks. She usually manually plucks the hairs when they appear she Denies a personal history of PCOS or androgen or adrenal disease. She thinks the dark hairs started to appear alongside or right after pregnancy. Currently has an IUD. Unsure if other female family members have unwanted facial hair.   Patient body hair cosmetically unappealing and is interested in options to improve/lessen facial hair.  (9/3/2020)-TSH-within normal limits, free T4 and free T3-within normal limits      Admits to sun exposure in youth without Smokeless tobacco: Never Used   Substance and Sexual Activity    Alcohol use: Yes     Comment: 1-2 per month on average    Drug use: No    Sexual activity: Yes   Lifestyle    Physical activity     Days per week: Not on file     Minutes per session: Not on file    Stress: Not on file   Relationships    Social connections     Talks on phone: Not on file     Gets together: Not on file     Attends Church service: Not on file     Active member of club or organization: Not on file     Attends meetings of clubs or organizations: Not on file     Relationship status: Not on file    Intimate partner violence     Fear of current or ex partner: Not on file     Emotionally abused: Not on file     Physically abused: Not on file     Forced sexual activity: Not on file   Other Topics Concern    Not on file   Social History Narrative    Not on file       Physical Examination     The following were examined and determined to be normal: Psych/Neuro, Scalp/hair, Conjunctivae/eyelids, Gums/teeth/lips, Neck, Abdomen, Back, RUE, LUE, RLE, LLE and Nails/digits. buttocks. Areas covered by underwear garment(s) not examined. The following were examined and determined to be abnormal: Head/face and Breast/axilla/chest.     [facility] phototype: 2    -Constitutional: Well appearing, no acute distress  -Neurological: Alert and oriented X 3  -Mood and Affect: Pleasant  Total body skin exam performed, areas examined listed above:   1. Scattered on the head,neck, trunk and extremities are multiple well-defined round and oval symmetric smoothly-bordered uniformly brown macules and papules; no bleeding nevi, including right inferior breast-3-4 mm well-circumscribed uniform brown papule  2. Bilateral upper eyelid margins-2-3 yellowish-white 2mm well circumscribed papules  3. Left axilla-fairly well demarcated smooth erythematous patch, right axilla, inguinal folds, infra mammary fold-clear  4.   Chin, inferior cheeks, upper Lip-several scattered coarse dark terminal hairs. Assessment and Plan     1. Multiple benign nevi    2. Milia of eyelid, unspecified laterality    3. Dermatitis    4. Hirsutism        1. Multiple benign nevi  Benign acquired melanocytic nevi  -Recommend monthly self skin exams   -Educated regarding the ABCDEs of melanoma detection   -Call for any new/changing moles or concerning lesions  -Reviewed sun protective behavior -- sun avoidance during the peak hours of the day, sun-protective clothing (including hat and sunglasses), sunscreen use (water resistant, broad spectrum, SPF at least 30, need for reapplication every 1.5 to 2 hours), avoidance of tanning beds   -Plan: Observation with annual skin checks (earlier if indicated) performed in office to monitor current nevi and to assess for new lesions. 2. Milia of eyelid, unspecified laterality  -Reassurance, benign small cyst.  No treatment is necessary   -finds cosmetically unappealing interested in removal, referral sent to [facility]:   - Valentine Tao MD, Ophthalmology, [place]    3. Dermatitis  DDx: contact dermatitis v. Inverse psoriasis  - triamcinolone (KENALOG) 0.025 % cream; Apply to affected area on left armpit twice daily for up to 1 week then stop for 1 week prn flares    -Edu re: sparing use, atrophy, striae, hypopigmentation, telangiectasias.  -Recommend avoiding deodorants with fragrances and dyes and switching to gentler formula such as vanicream deoderant, pt concerned about bromhidrosis if she would do this.      4. Hirsutism  -hormonal influence likely, possibly familial.  -f/u with OBGYN as needed for PCOS workup  -Discussed risk versus benefits options of vaniqa cream v.  Semipermanent laser hair removal, patient elects for laser hair removal.    --Recommend GentleLASE laser treatment to chin, inferior cheeks, upper cutaneous lip hair removal, may take 4-6 treatments to obtain significant improvement with maintenance treatment as needed, semi-permanent hair removal.  Risks versus benefits and alternatives discussed. Patient aware that this is an out-of-pocket cosmetic procedure and plans to call in to the office to schedule procedural appointment PRN   $200/tx for chin, inferior cheeks, upper cutaneous lip         Return to Clinic: PRN 15 min gentlelase hair; yearly skin exam   Discussed plan with patient and/or primary caretaker. Patient to call clinic with any questions / concerns. Reviewed proper use and side effects of treatment(s) and/or medication(s) with patient and/or primary caretaker. AVS provided or is available on [employer]     Note is transcribed using voice recognition software. Inadvertent computerized transcription errors may be present.

## 2021-04-20 DIAGNOSIS — J32.9 VIRAL SINUSITIS: ICD-10-CM

## 2021-04-20 DIAGNOSIS — B97.89 VIRAL SINUSITIS: ICD-10-CM

## 2021-04-20 RX ORDER — FLUTICASONE PROPIONATE 50 MCG
SPRAY, SUSPENSION (ML) NASAL
Qty: 1 BOTTLE | Refills: 1 | Status: SHIPPED | OUTPATIENT
Start: 2021-04-20 | End: 2021-10-26

## 2021-04-20 NOTE — TELEPHONE ENCOUNTER
Future Appointments   Date Time Provider Andrea Daigle   5/4/2021  1:15 PM Tanvi Matta DO [facility]     [facility] 1/19/2021

## 2021-05-03 NOTE — PATIENT INSTRUCTIONS
Sun Protection Tips    Apply broad spectrum water resistant sunscreen with an SPF of at least 30 to exposed areas of the skin. Dont forget the ears and lips! Remember to reapply sunscreen about every 2 hours and after swimming or sweating. Wear sun protective clothing. Swim shirts (aka. rash guards) are a great idea and negates the need to reapply sunscreen in those areas. Seek the shade whenever possible especially between the hours of 10am and 4pm when the suns rays are the strongest.     Avoid tanning beds          Wear a wide brim hat while in the sun        Following the procedure, the treated areas may be red or appear bruised and will likely be swollen for a few hours or up to a few days. If brown spots were treated today, expect that they will darken first for about one week before shedding off. The affected areas should be treated delicately following treatment. Discomfort and swelling should be treated with the application of hourly cool compresses the evening of the procedure. Avoid applying ice directly to the skin. If your face was treated, you may want to sleep with your head elevated on an extra pillow to help minimize swelling. Use Aquaphor daily as needed to treated areas. Multiple consecutive treatments may be needed to achieve desired outcome or significant improvement. Wear sunscreen daily. Avoid extra aspirin, ibuprofen, vitamin E following the procedure - this may increase your chance of bruising. Improper care of the treated area while the discoloration is present may increase the chance of scarring, hypo- or hyper-pigmentation, or skin textural changes to the treated area. Please call the office if you have excessive discomfort, herpes simplex virus flare, or burns, blisters, or scabbing. Thanks for your visit! Feel free to call Dr. Tomasa Rios assistantJanice if you have questions, concerns or to schedule your cosmetic procedures.

## 2021-05-04 ENCOUNTER — PROCEDURE VISIT (OUTPATIENT)
Dept: DERMATOLOGY | Age: 36
End: 2021-05-04

## 2021-05-04 VITALS — TEMPERATURE: 99.1 F

## 2021-05-04 DIAGNOSIS — Z41.1 ELECTIVE PROCEDURE FOR UNACCEPTABLE COSMETIC APPEARANCE: ICD-10-CM

## 2021-05-04 DIAGNOSIS — R68.89 UNWANTED HAIR: Primary | ICD-10-CM

## 2021-05-04 PROCEDURE — DM01310 VBEAM LASER SMALL OR 2 AREAS: Performed by: DERMATOLOGY

## 2021-05-04 NOTE — PROGRESS NOTES
[facility]) Dermatology  [facility] [place], [place], [address]       Judie Hammans  1985    43 y.o. female     Date of Visit: 5/4/2021    Chief Complaint:   Chief Complaint   Patient presents with    Laser Treatment     hair removal        I was asked to see this patient by Dr. Ly ref. provider found. History of Present Illness:  Judie Hammans is a 43 y.o. female who presents with the chief complaint of unwanted hair to upper cutaneous lip, chin, inferior cheeks    Presents today for treatment #1 with Gentlelase alexandrite laser. Questions answered in detail. Risks v. Benefits discussed. Patient aware may take more than one monthly treatment to attain significant improvement. PATIENT IDENTIFIED PER PROTOCOL: yes  LOCATION(S): :upper cutaneous lip, chin, inferior cheeks  VERIFIED AND MARKED: yes  TECHNIQUES, RISKS, BENEFITS AND ALTERNATIVES EXPLAINED: yes  CONSENT SIGNED, WITNESSED AND DATED: yes      RISKS: Pain with treatment, swelling, pigmentary changes, scarring, blisters/crusting, non-resolution, recurrence, unwanted cosmetic outcome, the need for multiple treatments. We discussed treatment options, including no treatment as well as the following:  - need for multiple treatments and risk of incomplete clearance, recurrence  - risk of erythema, edema, purpura, dyspigmentation and scarring    In addition, the importance of sun avoidance/protection and avoiding manipulation to the areas were emphasized with the patient. Review of Systems:  Constitutional: Reports general sense of well-being   Skin: No new or changing moles, no history of keloids or hypertrophic scars. Heme: No abnormal bruising or bleeding. Past Medical History, Family History, Surgical History, Medications and Allergies reviewed.     Family History   Problem Relation Age of Onset    High Blood Pressure Mother     High Cholesterol Mother     High Blood Pressure Father     High Cholesterol Father     Heart Disease Father     High Cholesterol Brother     Heart Disease Paternal Aunt     Stroke Paternal Uncle     Heart Disease Paternal Uncle     Stroke Maternal Grandmother     Arthritis Paternal Grandmother     Heart Disease Paternal Grandfather      Past Medical History:   Diagnosis Date    Acquired hypothyroidism 8/26/2018    Allergic rhinitis     Goiter, non-toxic     Hypertension     Hyperthyroidism 8/20/2018     Past Surgical History:   Procedure Laterality Date    CYST REMOVAL  07/18/2018    Scalp    EYE SURGERY      WISDOM TOOTH EXTRACTION         Allergies   Allergen Reactions    Prednisone      flushing     Outpatient Medications Marked as Taking for the 5/4/21 encounter (Procedure visit) with Karen Basilio, DO   Medication Sig Dispense Refill    fluticasone (FLONASE) 50 MCG/ACT nasal spray SPRAY 1 SPRAY INTO EACH NOSTRIL EVERY DAY 1 Bottle 1    levothyroxine (SYNTHROID) 25 MCG tablet TAKE 1 TABLET BY MOUTH EVERY DAY 90 tablet 0    triamcinolone (KENALOG) 0.025 % cream Apply to affected area on left armpit twice daily for up to 1 week then stop for 1 week prn flares 15 g 1    traZODone (DESYREL) 50 MG tablet TAKE 1 TABLET BY MOUTH EVERY DAY AT BEDTIME AS NEEDED FOR SLEEP 90 tablet 1    atorvastatin (LIPITOR) 10 MG tablet TAKE 1 TABLET BY MOUTH EVERY DAY 90 tablet 1    albuterol sulfate HFA (PROAIR HFA) 108 (90 Base) MCG/ACT inhaler Inhale 2 puffs into the lungs every 6 hours as needed for Wheezing 1 Inhaler 3    esomeprazole (NEXIUM) 20 MG delayed release capsule Take 20 mg by mouth every morning (before breakfast)      cetirizine (ZYRTEC) 10 MG tablet Take by mouth      Flaxseed, Linseed, 1000 MG CAPS Take  by mouth.  fish oil-omega-3 fatty acids 1000 MG capsule Take 1,600 mg by mouth daily       multivitamin (THERAGRAN) per tablet Take 1 tablet by mouth daily.  Cholecalciferol (VITAMIN D) 2000 units CAPS capsule Take 2,000 Units by mouth daily.            Social History: Social History     Socioeconomic History    Marital status:      Spouse name: Not on file    Number of children: Not on file    Years of education: Not on file    Highest education level: Not on file   Occupational History    Not on file   Social Needs    Financial resource strain: Not on file    Food insecurity     Worry: Not on file     Inability: Not on file    Transportation needs     Medical: Not on file     Non-medical: Not on file   Tobacco Use    Smoking status: Never Smoker    Smokeless tobacco: Never Used   Substance and Sexual Activity    Alcohol use: Yes     Comment: 1-2 per month on average    Drug use: No    Sexual activity: Yes   Lifestyle    Physical activity     Days per week: Not on file     Minutes per session: Not on file    Stress: Not on file   Relationships    Social connections     Talks on phone: Not on file     Gets together: Not on file     Attends Oriental orthodox service: Not on file     Active member of club or organization: Not on file     Attends meetings of clubs or organizations: Not on file     Relationship status: Not on file    Intimate partner violence     Fear of current or ex partner: Not on file     Emotionally abused: Not on file     Physically abused: Not on file     Forced sexual activity: Not on file   Other Topics Concern    Not on file   Social History Narrative    Not on file       Physical Examination     Well appearing, NAD, A&Ox3  -Several scattered dark terminal hairs of varying lengths located to upper cutaneous lip, chin, inferior cheeks      1. TREATMENT # 1  AREA TO BE TREATED:upper cutaneous lip, chin, inferior cheeks  DIAGNOSIS, LOCATION, PROCEDURE RECONFIRMED: yes  EYE PROTECTION: yes  ANESTHESIA/PRE-OP MEDICATIONS: none  LASER SETTINGS:  (1)  (1)  WAVELENGTH: 755 LENS:18mm spot size FLUENCE: 20 J/cm2 COOLIN/40  Assessment and Plan     1. Unwanted hair    2. Elective procedure for unacceptable cosmetic appearance        1.  Unwanted hair  Gentlelase alexandrite laser treatment as outlined above. Patient educated to wear sunscreen of at least SPF 30 daily to face, neck, chest and other sun exposed areas    POST-OPERATIVE CARE/DISPOSITION: aquaphor and ice,  patient tolerated procedure well, left in stable condition  COMPLICATIONS: none  MEDICATIONS: none  WOUND CARE INSTRUCTIONS PROVIDED: yes, patient cooled treated areas with ice pack for 5 minutes before leaving, Instructed to ice the area for 5 minutes every hour for 5 hours at home    Discussed with patient that she will have redness, discoloration, swelling, and likely bruising and may take 2-3 weeks to resolve. She is to call the office if she experiences any adverse side effects that are concerning to her.    -will need total of 4-6 monthly treatments. Semipermanent hair treatment.     $200      2. Elective procedure for unacceptable cosmetic appearance  See above         Note is transcribed using voice recognition software. Inadvertent computerized transcription errors may be present. Return for 1 month f/u for gentlelase laser hair #2.

## 2021-05-12 DIAGNOSIS — E78.00 ELEVATED LDL CHOLESTEROL LEVEL: ICD-10-CM

## 2021-05-12 DIAGNOSIS — E03.9 ACQUIRED HYPOTHYROIDISM: ICD-10-CM

## 2021-05-12 DIAGNOSIS — E55.9 VITAMIN D DEFICIENCY: ICD-10-CM

## 2021-05-12 DIAGNOSIS — R73.01 IFG (IMPAIRED FASTING GLUCOSE): ICD-10-CM

## 2021-05-12 LAB
A/G RATIO: 1.9 (ref 1.1–2.2)
ALBUMIN SERPL-MCNC: 4.9 G/DL (ref 3.4–5)
ALP BLD-CCNC: 58 U/L (ref 40–129)
ALT SERPL-CCNC: 28 U/L (ref 10–40)
ANION GAP SERPL CALCULATED.3IONS-SCNC: 11 MMOL/L (ref 3–16)
AST SERPL-CCNC: 25 U/L (ref 15–37)
BILIRUB SERPL-MCNC: 0.4 MG/DL (ref 0–1)
BUN BLDV-MCNC: 11 MG/DL (ref 7–20)
CALCIUM SERPL-MCNC: 10.1 MG/DL (ref 8.3–10.6)
CHLORIDE BLD-SCNC: 105 MMOL/L (ref 99–110)
CHOLESTEROL, TOTAL: 172 MG/DL (ref 0–199)
CO2: 28 MMOL/L (ref 21–32)
CREAT SERPL-MCNC: 0.6 MG/DL (ref 0.6–1.1)
GFR AFRICAN AMERICAN: >60
GFR NON-AFRICAN AMERICAN: >60
GLOBULIN: 2.6 G/DL
GLUCOSE BLD-MCNC: 89 MG/DL (ref 70–99)
HDLC SERPL-MCNC: 55 MG/DL (ref 40–60)
LDL CHOLESTEROL CALCULATED: 92 MG/DL
POTASSIUM SERPL-SCNC: 4.7 MMOL/L (ref 3.5–5.1)
SODIUM BLD-SCNC: 144 MMOL/L (ref 136–145)
T3 FREE: 3.1 PG/ML (ref 2.3–4.2)
T4 FREE: 1.1 NG/DL (ref 0.9–1.8)
TOTAL PROTEIN: 7.5 G/DL (ref 6.4–8.2)
TRIGL SERPL-MCNC: 123 MG/DL (ref 0–150)
TSH SERPL DL<=0.05 MIU/L-ACNC: 1.54 UIU/ML (ref 0.27–4.2)
VITAMIN D 25-HYDROXY: 59.8 NG/ML
VLDLC SERPL CALC-MCNC: 25 MG/DL

## 2021-06-01 ENCOUNTER — PROCEDURE VISIT (OUTPATIENT)
Dept: DERMATOLOGY | Age: 36
End: 2021-06-01

## 2021-06-01 VITALS — TEMPERATURE: 98.8 F

## 2021-06-01 DIAGNOSIS — Z41.1 ELECTIVE PROCEDURE FOR UNACCEPTABLE COSMETIC APPEARANCE: ICD-10-CM

## 2021-06-01 DIAGNOSIS — R23.8 PIEZOGENIC PEDAL PAPULE: ICD-10-CM

## 2021-06-01 DIAGNOSIS — R68.89 UNWANTED HAIR: Primary | ICD-10-CM

## 2021-06-01 PROCEDURE — 99212 OFFICE O/P EST SF 10 MIN: CPT | Performed by: DERMATOLOGY

## 2021-06-01 PROCEDURE — DM01310 VBEAM LASER SMALL OR 2 AREAS: Performed by: DERMATOLOGY

## 2021-06-01 NOTE — PATIENT INSTRUCTIONS
Sun Protection Tips    Apply broad spectrum water resistant sunscreen with an SPF of at least 30 to exposed areas of the skin. Dont forget the ears and lips! Remember to reapply sunscreen about every 2 hours and after swimming or sweating. Wear sun protective clothing. Swim shirts (aka. rash guards) are a great idea and negates the need to reapply sunscreen in those areas. Seek the shade whenever possible especially between the hours of 10am and 4pm when the suns rays are the strongest.     Avoid tanning beds          Wear a wide brim hat while in the sun        Following the procedure, the treated areas may be red or appear bruised and will likely be swollen for a few hours or up to a few days. If brown spots were treated today, expect that they will darken first for about one week before shedding off. The affected areas should be treated delicately following treatment. Discomfort and swelling should be treated with the application of hourly cool compresses the evening of the procedure. Avoid applying ice directly to the skin. If your face was treated, you may want to sleep with your head elevated on an extra pillow to help minimize swelling. Use Aquaphor daily as needed to treated areas. Multiple consecutive treatments may be needed to achieve desired outcome or significant improvement. Wear sunscreen daily. Avoid extra aspirin, ibuprofen, vitamin E following the procedure - this may increase your chance of bruising. Improper care of the treated area while the discoloration is present may increase the chance of scarring, hypo- or hyper-pigmentation, or skin textural changes to the treated area. Please call the office if you have excessive discomfort, herpes simplex virus flare, or burns, blisters, or scabbing. Thanks for your visit! Feel free to call/Samplesaint message  Dr. Benja Rapp assistantJanice if you have questions, concerns or to schedule your cosmetic procedures.

## 2021-06-01 NOTE — PROGRESS NOTES
[facility]) Dermatology  [facility], [place], [address]       Zaida Aguilar  1985    43 y.o. female     Date of Visit: 6/1/2021    Chief Complaint:   Chief Complaint   Patient presents with    Laser Treatment     gentlelase on upper lip, chin, inferior cheeks. I was asked to see this patient by Dr. Ly ref. provider found. History of Present Illness:  Zaida Aguilar is a 43 y.o. female who presents with the chief complaint of unwanted hair to upper cutaneous lip, chin, inferior cheeks    Presents today for treatment #2 with Gentlelase alexandrite laser. Questions answered in detail. Risks v. Benefits discussed. Patient aware may take more than one monthly treatment to attain significant improvement. Patient tolerated procedure well at prior visit and denies unwanted side effects or any concerns prior to today's procedure. PATIENT IDENTIFIED PER PROTOCOL: yes  LOCATION(S): :upper cutaneous lip, chin, inferior cheeks  VERIFIED AND MARKED: yes  TECHNIQUES, RISKS, BENEFITS AND ALTERNATIVES EXPLAINED: yes  CONSENT SIGNED, WITNESSED AND DATED: yes      RISKS: Pain with treatment, swelling, pigmentary changes, scarring, blisters/crusting, non-resolution, recurrence, unwanted cosmetic outcome, the need for multiple treatments. We discussed treatment options, including no treatment as well as the following:  - need for multiple treatments and risk of incomplete clearance, recurrence  - risk of erythema, edema, purpura, dyspigmentation and scarring    In addition, the importance of sun avoidance/protection and avoiding manipulation to the areas were emphasized with the patient. 2.  Unrelated complaint of new small subcutaneous bumps to her right heel she noted developing within the last 6 months. She has been more active over this timeframe outdoors with walking and exercising. Denies any pain, swelling, arthritis to the joints of her feet or ankles.       Review of Systems:  Constitutional: Reports general sense of well-being   Skin: No new or changing moles, no history of keloids or hypertrophic scars. Heme: No abnormal bruising or bleeding. Past Medical History, Family History, Surgical History, Medications and Allergies reviewed.     Family History   Problem Relation Age of Onset    High Blood Pressure Mother     High Cholesterol Mother     High Blood Pressure Father     High Cholesterol Father     Heart Disease Father     High Cholesterol Brother     Heart Disease Paternal Aunt     Stroke Paternal Uncle     Heart Disease Paternal Uncle     Stroke Maternal Grandmother     Arthritis Paternal Grandmother     Heart Disease Paternal Grandfather      Past Medical History:   Diagnosis Date    Acquired hypothyroidism 8/26/2018    Allergic rhinitis     Goiter, non-toxic     Hypertension     Hyperthyroidism 8/20/2018     Past Surgical History:   Procedure Laterality Date    CYST REMOVAL  07/18/2018    Scalp    EYE SURGERY      WISDOM TOOTH EXTRACTION         Allergies   Allergen Reactions    Prednisone      flushing     Outpatient Medications Marked as Taking for the 6/1/21 encounter (Procedure visit) with Rosana Bamberger, DO   Medication Sig Dispense Refill    fluticasone (FLONASE) 50 MCG/ACT nasal spray SPRAY 1 SPRAY INTO EACH NOSTRIL EVERY DAY 1 Bottle 1    levothyroxine (SYNTHROID) 25 MCG tablet TAKE 1 TABLET BY MOUTH EVERY DAY 90 tablet 0    triamcinolone (KENALOG) 0.025 % cream Apply to affected area on left armpit twice daily for up to 1 week then stop for 1 week prn flares 15 g 1    traZODone (DESYREL) 50 MG tablet TAKE 1 TABLET BY MOUTH EVERY DAY AT BEDTIME AS NEEDED FOR SLEEP 90 tablet 1    atorvastatin (LIPITOR) 10 MG tablet TAKE 1 TABLET BY MOUTH EVERY DAY 90 tablet 1    albuterol sulfate HFA (PROAIR HFA) 108 (90 Base) MCG/ACT inhaler Inhale 2 puffs into the lungs every 6 hours as needed for Wheezing 1 Inhaler 3    esomeprazole (NEXIUM) 20 MG delayed release capsule Take 20 mg by mouth every morning (before breakfast)      cetirizine (ZYRTEC) 10 MG tablet Take by mouth      Flaxseed, Linseed, 1000 MG CAPS Take  by mouth.  fish oil-omega-3 fatty acids 1000 MG capsule Take 1,600 mg by mouth daily       multivitamin (THERAGRAN) per tablet Take 1 tablet by mouth daily.  Cholecalciferol (VITAMIN D) 2000 units CAPS capsule Take 2,000 Units by mouth daily. Social History:   Social History     Socioeconomic History    Marital status:      Spouse name: Not on file    Number of children: Not on file    Years of education: Not on file    Highest education level: Not on file   Occupational History    Not on file   Tobacco Use    Smoking status: Never Smoker    Smokeless tobacco: Never Used   Vaping Use    Vaping Use: Never used   Substance and Sexual Activity    Alcohol use: Yes     Comment: 1-2 per month on average    Drug use: No    Sexual activity: Yes   Other Topics Concern    Not on file   Social History Narrative    Not on file     Social Determinants of Health     Financial Resource Strain:     Difficulty of Paying Living Expenses:    Food Insecurity:     Worried About Running Out of Food in the Last Year:     [address] in the Last Year:    Transportation Needs:     Lack of Transportation (Medical):      Lack of Transportation (Non-Medical):    Physical Activity:     Days of Exercise per Week:     Minutes of Exercise per Session:    Stress:     Feeling of Stress :    Social Connections:     Frequency of Communication with Friends and Family:     Frequency of Social Gatherings with Friends and Family:     Attends Gnosticism Services:     Active Member of Clubs or Organizations:     Attends Club or Organization Meetings:     Marital Status:    Intimate Partner Violence:     Fear of Current or Ex-Partner:     Emotionally Abused:     Physically Abused:     Sexually Abused:        Physical Examination     Well appearing, NAD, A&Ox3  -Several scattered shaved dark terminal hairs to cutaneous lip, chin, inferior cheeks     1. TREATMENT # 2  AREA TO BE TREATED:upper cutaneous lip, chin, inferior cheeks  DIAGNOSIS, LOCATION, PROCEDURE RECONFIRMED: yes  EYE PROTECTION: yes  ANESTHESIA/PRE-OP MEDICATIONS: none  LASER SETTINGS:  (1)  (1)  WAVELENGTH: 755 LENS:18mm spot size FLUENCE: 20 J/cm2 COOLIN/40  2. Right posterior border of heel-3 soft skin colored well-circumscribed papules  Assessment and Plan     1. Unwanted hair    2. Piezogenic pedal papule    3. Elective procedure for unacceptable cosmetic appearance        1. Unwanted hair  Gentlelase alexandrite laser treatment as outlined above. Patient educated to wear sunscreen of at least SPF 30 daily to face, neck, chest and other sun exposed areas    POST-OPERATIVE CARE/DISPOSITION: aquaphor and ice,  patient tolerated procedure well, left in stable condition  COMPLICATIONS: none  MEDICATIONS: none  WOUND CARE INSTRUCTIONS PROVIDED: yes, patient cooled treated areas with ice pack for 5 minutes before leaving, Instructed to ice the area for 5 minutes every hour for 5 hours at home    Discussed with patient that she will have redness, discoloration, swelling, and likely bruising and may take 2-3 weeks to resolve. She is to call the office if she experiences any adverse side effects that are concerning to her.    -will need total of 4-6 monthly treatments. Semipermanent hair treatment.     $200    2. Piezogenic pedal papules  Reassurance and observation, recommend patient visit with podiatrist if the areas begin to grow or become painful.    -Patient verbalized understanding of submission to insurance for medical concern which is separate from her cosmetic unwanted hair laser treatment today    3. Elective procedure for unacceptable cosmetic appearance  See above (#1)         Note is transcribed using voice recognition software.  Inadvertent computerized transcription errors may be present. Return for 1st Tuesday of July for laser hair treatment .

## 2021-06-03 DIAGNOSIS — E03.9 ACQUIRED HYPOTHYROIDISM: ICD-10-CM

## 2021-06-04 RX ORDER — LEVOTHYROXINE SODIUM 0.03 MG/1
TABLET ORAL
Qty: 90 TABLET | Refills: 2 | OUTPATIENT
Start: 2021-06-04

## 2021-06-18 DIAGNOSIS — E78.00 ELEVATED LDL CHOLESTEROL LEVEL: ICD-10-CM

## 2021-06-18 DIAGNOSIS — E03.9 ACQUIRED HYPOTHYROIDISM: ICD-10-CM

## 2021-06-21 RX ORDER — ATORVASTATIN CALCIUM 10 MG/1
TABLET, FILM COATED ORAL
Qty: 90 TABLET | Refills: 1 | Status: SHIPPED | OUTPATIENT
Start: 2021-06-21 | End: 2021-12-18

## 2021-06-21 RX ORDER — LEVOTHYROXINE SODIUM 0.03 MG/1
TABLET ORAL
Qty: 90 TABLET | Refills: 0 | Status: SHIPPED | OUTPATIENT
Start: 2021-06-21 | End: 2021-09-13

## 2021-06-21 NOTE — TELEPHONE ENCOUNTER
Requested Prescriptions     Pending Prescriptions Disp Refills    levothyroxine (SYNTHROID) 25 MCG tablet [Pharmacy Med Name: LEVOTHYROXINE 25 MCG TABLET] 90 tablet 0     Sig: TAKE 1 TABLET BY MOUTH EVERY DAY       FOV: 07/15/2021    LOV: 10/01/2020

## 2021-06-29 ENCOUNTER — TELEPHONE (OUTPATIENT)
Dept: FAMILY MEDICINE CLINIC | Age: 36
End: 2021-06-29

## 2021-06-29 NOTE — TELEPHONE ENCOUNTER
----- Message from [facility] (TOREY HAYWOOD) sent at 6/28/2021  3:05 PM EDT -----  Subject: Appointment Request    Reason for Call: Routine Physical Exam    QUESTIONS  Type of Appointment? Established Patient  Reason for appointment request? No appointments available during search  Additional Information for Provider? yearly physical   ---------------------------------------------------------------------------  --------------  CALL BACK INFO  What is the best way for the office to contact you? OK to leave message on   voicemail  Preferred Call Back Phone Number? [phone]  ---------------------------------------------------------------------------  --------------  SCRIPT ANSWERS  Relationship to Patient? Self  Appointment reason? Well Care/Follow Ups  Select a Well Care/Follow Ups appointment reason? Adult Physical Exam   [Medicare Annual Wellness, AWV, PAP, Pelvic]  (If the patient has Medicare as their primary insurance coverage ask this   question) Are you requesting a Medicare Annual Wellness Visit? No  (Is the patient requesting a pap smear with their physical exam?)? No  (Is the patient requesting their annual physical and does not need PAP or   AWV per above?)? Yes   Have you been diagnosed with, awaiting test results for, or told that you   are suspected of having COVID-19 (Coronavirus)? (If patient has tested   negative or was tested as a requirement for work, school, or travel and   not based on symptoms, answer no)? No  Do you currently have flu-like symptoms including fever or chills, cough,   shortness of breath, difficulty breathing, or new loss of taste or smell? No  Have you had close contact with someone with COVID-19 in the last 14 days? No  (Service Expert  click yes below to proceed with [employer] As Usual   Scheduling)?  Yes

## 2021-06-29 NOTE — TELEPHONE ENCOUNTER
Future Appointments   Date Time Provider Andrea Oxana   7/6/2021  1:30 PM Juan Diego Gaines [facility]   7/15/2021  3:10 PM MD Val Salazar [facility]   8/5/2021  4:30 PM Elin Kahn DO [place] [id number]

## 2021-07-06 ENCOUNTER — PROCEDURE VISIT (OUTPATIENT)
Dept: DERMATOLOGY | Age: 36
End: 2021-07-06

## 2021-07-06 VITALS — TEMPERATURE: 98.4 F

## 2021-07-06 DIAGNOSIS — Z41.1 ELECTIVE PROCEDURE FOR UNACCEPTABLE COSMETIC APPEARANCE: ICD-10-CM

## 2021-07-06 DIAGNOSIS — R68.89 UNWANTED HAIR: Primary | ICD-10-CM

## 2021-07-06 PROCEDURE — DM01340 VBEAM LASER ANGIOMAS 6-15: Performed by: DERMATOLOGY

## 2021-07-06 NOTE — PROGRESS NOTES
[facility]) Dermatology  [facility], [place], [address]       Jalil Zhu  1985    43 y.o. female     Date of Visit: 7/6/2021    Chief Complaint:   Chief Complaint   Patient presents with    Laser Treatment     gentlelase for unwanted hair        I was asked to see this patient by Dr. Ly ref. provider found. History of Present Illness:  Jalil Zhu is a 43 y.o. female who presents with the chief complaint of unwanted hair to upper cutaneous lip, chin, inferior cheeks    Presents today for treatment #3 with Gentlelase alexandrite laser. Questions answered in detail. Risks v. Benefits discussed. Patient aware may take more than one monthly treatment to attain significant improvement. Patient tolerated procedure well at prior visit and denies unwanted side effects or any concerns prior to today's procedure. PATIENT IDENTIFIED PER PROTOCOL: yes  LOCATION(S): :upper cutaneous lip, chin, inferior cheeks  VERIFIED AND MARKED: yes  TECHNIQUES, RISKS, BENEFITS AND ALTERNATIVES EXPLAINED: yes  CONSENT SIGNED, WITNESSED AND DATED: yes      RISKS: Pain with treatment, swelling, pigmentary changes, scarring, blisters/crusting, non-resolution, recurrence, unwanted cosmetic outcome, the need for multiple treatments. We discussed treatment options, including no treatment as well as the following:  - need for multiple treatments and risk of incomplete clearance, recurrence  - risk of erythema, edema, purpura, dyspigmentation and scarring    In addition, the importance of sun avoidance/protection and avoiding manipulation to the areas were emphasized with the patient. Review of Systems:  Constitutional: Reports general sense of well-being   Skin: No new or changing moles, no history of keloids or hypertrophic scars. Heme: No abnormal bruising or bleeding. Past Medical History, Family History, Surgical History, Medications and Allergies reviewed.     Family History   Problem Relation Age of Onset    High Blood Pressure Mother     High Cholesterol Mother     High Blood Pressure Father     High Cholesterol Father     Heart Disease Father     High Cholesterol Brother     Heart Disease Paternal Aunt     Stroke Paternal Uncle     Heart Disease Paternal Uncle     Stroke Maternal Grandmother     Arthritis Paternal Grandmother     Heart Disease Paternal Grandfather      Past Medical History:   Diagnosis Date    Acquired hypothyroidism 8/26/2018    Allergic rhinitis     Goiter, non-toxic     Hypertension     Hyperthyroidism 8/20/2018     Past Surgical History:   Procedure Laterality Date    CYST REMOVAL  07/18/2018    Scalp    EYE SURGERY      WISDOM TOOTH EXTRACTION         Allergies   Allergen Reactions    Prednisone      flushing     Outpatient Medications Marked as Taking for the 7/6/21 encounter (Procedure visit) with Eh Celaya, DO   Medication Sig Dispense Refill    atorvastatin (LIPITOR) 10 MG tablet TAKE 1 TABLET BY MOUTH EVERY DAY 90 tablet 1    levothyroxine (SYNTHROID) 25 MCG tablet TAKE 1 TABLET BY MOUTH EVERY DAY 90 tablet 0    fluticasone (FLONASE) 50 MCG/ACT nasal spray SPRAY 1 SPRAY INTO EACH NOSTRIL EVERY DAY 1 Bottle 1    triamcinolone (KENALOG) 0.025 % cream Apply to affected area on left armpit twice daily for up to 1 week then stop for 1 week prn flares 15 g 1    traZODone (DESYREL) 50 MG tablet TAKE 1 TABLET BY MOUTH EVERY DAY AT BEDTIME AS NEEDED FOR SLEEP 90 tablet 1    albuterol sulfate HFA (PROAIR HFA) 108 (90 Base) MCG/ACT inhaler Inhale 2 puffs into the lungs every 6 hours as needed for Wheezing 1 Inhaler 3    esomeprazole (NEXIUM) 20 MG delayed release capsule Take 20 mg by mouth every morning (before breakfast)      cetirizine (ZYRTEC) 10 MG tablet Take by mouth      Flaxseed, Linseed, 1000 MG CAPS Take  by mouth.         fish oil-omega-3 fatty acids 1000 MG capsule Take 1,600 mg by mouth daily       multivitamin (THERAGRAN) per tablet Take 1 tablet by mouth daily.  Cholecalciferol (VITAMIN D) 2000 units CAPS capsule Take 2,000 Units by mouth daily. Social History:   Social History     Socioeconomic History    Marital status:      Spouse name: Not on file    Number of children: Not on file    Years of education: Not on file    Highest education level: Not on file   Occupational History    Not on file   Tobacco Use    Smoking status: Never Smoker    Smokeless tobacco: Never Used   Vaping Use    Vaping Use: Never used   Substance and Sexual Activity    Alcohol use: Yes     Comment: 1-2 per month on average    Drug use: No    Sexual activity: Yes   Other Topics Concern    Not on file   Social History Narrative    Not on file     Social Determinants of Health     Financial Resource Strain:     Difficulty of Paying Living Expenses:    Food Insecurity:     Worried About Running Out of Food in the Last Year:     [address] in the Last Year:    Transportation Needs:     Lack of Transportation (Medical):  Lack of Transportation (Non-Medical):    Physical Activity:     Days of Exercise per Week:     Minutes of Exercise per Session:    Stress:     Feeling of Stress :    Social Connections:     Frequency of Communication with Friends and Family:     Frequency of Social Gatherings with Friends and Family:     Attends [employer] Services:     Active Member of Clubs or Organizations:     Attends Club or Organization Meetings:     Marital Status:    Intimate Partner Violence:     Fear of Current or Ex-Partner:     Emotionally Abused:     Physically Abused:     Sexually Abused:        Physical Examination     Well appearing, NAD, A&Ox3  -Several scattered shaved dark terminal hairs to cutaneous upper lip, chin, inferior cheeks     1.  TREATMENT # 2  AREA TO BE TREATED:upper cutaneous lip, chin, inferior cheeks  DIAGNOSIS, LOCATION, PROCEDURE RECONFIRMED: yes  EYE PROTECTION: yes  ANESTHESIA/PRE-OP MEDICATIONS: none  LASER SETTINGS:  (1)  (1)  WAVELENGTH: 755 LENS:18mm spot size FLUENCE: 20 J/cm2 COOLIN/40  Assessment and Plan     1. Unwanted hair    2. Elective procedure for unacceptable cosmetic appearance        1. Unwanted hair  Gentlelase alexandrite laser treatment as outlined above. Patient educated to wear sunscreen of at least SPF 30 daily to face, neck, chest and other sun exposed areas    POST-OPERATIVE CARE/DISPOSITION: aquaphor and ice,  patient tolerated procedure well, left in stable condition  COMPLICATIONS: none  MEDICATIONS: none  WOUND CARE INSTRUCTIONS PROVIDED: yes, patient cooled treated areas with ice pack for 5 minutes before leaving, Instructed to ice the area for 5 minutes every hour for 5 hours at home    Discussed with patient that she will have redness, discoloration, swelling, and likely bruising and may take 2-3 weeks to resolve. She is to call the office if she experiences any adverse side effects that are concerning to her.    -will need total of 4-6 monthly treatments. Semipermanent hair treatment.     -She inquires about future laser hair removal to bilateral axillas, recommend 4-6 monthly treatments. Semipermanent hair removal treatment.  $300 per treatment    $200    2. Elective procedure for unacceptable cosmetic appearance  See above gentlelase treatment         Note is transcribed using voice recognition software. Inadvertent computerized transcription errors may be present. Return for 1 month gentlelase hair.

## 2021-07-06 NOTE — PATIENT INSTRUCTIONS
Sun Protection Tips    Apply broad spectrum water resistant sunscreen with an SPF of at least 30 to exposed areas of the skin. Dont forget the ears and lips! Remember to reapply sunscreen about every 2 hours and after swimming or sweating. Wear sun protective clothing. Swim shirts (aka. rash guards) are a great idea and negates the need to reapply sunscreen in those areas. Seek the shade whenever possible especially between the hours of 10am and 4pm when the suns rays are the strongest.     Avoid tanning beds          Wear a wide brim hat while in the sun        Following the procedure, the treated areas may be red or appear bruised and will likely be swollen for a few hours or up to a few days. If brown spots were treated today, expect that they will darken first for about one week before shedding off. The affected areas should be treated delicately following treatment. Discomfort and swelling should be treated with the application of hourly cool compresses the evening of the procedure. Avoid applying ice directly to the skin. If your face was treated, you may want to sleep with your head elevated on an extra pillow to help minimize swelling. Use Aquaphor daily as needed to treated areas. Multiple consecutive treatments may be needed to achieve desired outcome or significant improvement. Wear sunscreen daily. Avoid extra aspirin, ibuprofen, vitamin E following the procedure - this may increase your chance of bruising. Improper care of the treated area while the discoloration is present may increase the chance of scarring, hypo- or hyper-pigmentation, or skin textural changes to the treated area. Please call the office if you have excessive discomfort, herpes simplex virus flare, or burns, blisters, or scabbing. Thanks for your visit! Feel free to call/[employer] message  Dr. Jarred Haddad assistantJanice if you have questions, concerns or to schedule your cosmetic procedures.

## 2021-07-15 ENCOUNTER — OFFICE VISIT (OUTPATIENT)
Dept: ENDOCRINOLOGY | Age: 36
End: 2021-07-15
Payer: COMMERCIAL

## 2021-07-15 VITALS
HEIGHT: 66 IN | OXYGEN SATURATION: 98 % | RESPIRATION RATE: 14 BRPM | TEMPERATURE: 98 F | HEART RATE: 81 BPM | WEIGHT: 227 LBS | SYSTOLIC BLOOD PRESSURE: 110 MMHG | BODY MASS INDEX: 36.48 KG/M2 | DIASTOLIC BLOOD PRESSURE: 78 MMHG

## 2021-07-15 DIAGNOSIS — R73.01 IFG (IMPAIRED FASTING GLUCOSE): ICD-10-CM

## 2021-07-15 DIAGNOSIS — E66.9 CLASS 2 OBESITY WITH BODY MASS INDEX (BMI) OF 36.0 TO 36.9 IN ADULT, UNSPECIFIED OBESITY TYPE, UNSPECIFIED WHETHER SERIOUS COMORBIDITY PRESENT: ICD-10-CM

## 2021-07-15 DIAGNOSIS — E03.9 ACQUIRED HYPOTHYROIDISM: Primary | ICD-10-CM

## 2021-07-15 DIAGNOSIS — Z86.39 HISTORY OF HYPERTHYROIDISM: ICD-10-CM

## 2021-07-15 DIAGNOSIS — E04.2 MULTINODULAR GOITER: ICD-10-CM

## 2021-07-15 DIAGNOSIS — E06.3 HASHIMOTO'S THYROIDITIS: ICD-10-CM

## 2021-07-15 DIAGNOSIS — E55.9 VITAMIN D DEFICIENCY: ICD-10-CM

## 2021-07-15 DIAGNOSIS — E78.2 MIXED HYPERLIPIDEMIA: ICD-10-CM

## 2021-07-15 PROCEDURE — 99214 OFFICE O/P EST MOD 30 MIN: CPT | Performed by: INTERNAL MEDICINE

## 2021-07-15 NOTE — PROGRESS NOTES
SUBJECTIVE:  Ramirez Schmitz is a 43 y.o. female who is here for a hyperthyroidism, multinodular goiter. 1. Hypothyroidism    This started in 2001. Patient was diagnosed with MNG. The problem has been worsening. Previous thyroid studies include: TSH and free thyroxine. On levothyroxine 0.025 mg daily. Current complaints: improved insomnia, fatigue, hair loss. Fatigue is better. Sleeps well. Hair loss worse. Had a lot of stress, father, brother passed away recently. 2. Multinodular goiter    In 2001 she was diagnosed with 1.5 cm cystic nodule. Repeated US - right 1.4 cm, right superior 1.7 cm, left subcm nodules. Dr. Nedra Bustos follows. History of obstructive symptoms: difficulty swallowing No, changes in voice/hoarseness No.  History of radiation to patient's neck: No  Resent iodine exposure: No  Family history includes thyroid abnormalities. Family history of thyroid cancer: No    3. Obesity  Eats moderately healthy. On [employer]avia     2. History of Hyperthyroidism    No diarrhea. Hair loss postpartum. Calcium 10.6, ULN 10.5. Had son born 3/28/2018. Has another boy 3yo  Has Mary implant for birth control. Had short periods since 3/2018.    5. Hashimoto's thyroiditis  Has fatigue. 6. Postpartum thyroiditis  Has fatigue. Thyroid sono copy from Dr. Nedra Bustos note: The patient was placed in a semi-recumbent position with mild neck extension. Real time B-mode ultrasound on the neck was performed in transverse/ axial and longitudinal/ sagittal planes. Stable heterogenous MNG with cyto nondiagnostic R inf predominately cystic 1.1cm (prior1.4cm) with R sup solid 1.5cm (prior 1.7cm) and L inf solid hypo 1.1cm without suspicious features. Bilateral infrathyroid LNs benign appearing likely reactive. The patient tolerated the procedure well and without side effects. 7. IFG  Glucose 102    8. Vitamin D deficiency  Has fatigue    9.  Hyperlipidemia  Father had MI at age 46  No HTN, diabetes  No smoking      Name: Braeden Alonzo   :  1985   [address] [place] [address]. Staff: Alyx Tellez [id number]    Verified By: Rd SANZ         Samm: 09  11:51 am  Exams:  US-THYROID/NECK/HEAD      Thyroid ultrasound on 2009    History: Thyroid nodule    Comparison: None    Findings: The right lobe of the gland measures greater than 5 cm in  craniocaudal dimension. It extends beyond the confines of the  transducer. Transversely, it measures 2.4 x 2.5 cm. The left  lobe measures 4.8 x 2.4 x 1.6 cm. The thyroid demonstrates  diffusely heterogeneous echogenicity. There is coarsening of the  echotexture. Within the right lobe, there is a 1.3 x 0.7 x 0.9  cm cyst. There is also a hypoechoic nodule measuring 1.2 x 0.9 x  0.7 cm. The remainder of the gland is diffusely heterogeneous  and may contain additional small hypoechoic nodules. Impression:    Diffusely heterogeneous thyroid gland. There is a discrete cyst  and a discrete nodule seen within the right lobe as described  above.   * end of result *   Status         Past Medical History:   Diagnosis Date    Acquired hypothyroidism 2018    Allergic rhinitis     Goiter, non-toxic     Hypertension     Hyperthyroidism 2018     Patient Active Problem List    Diagnosis Date Noted    COVID-19 2021    IFG (impaired fasting glucose) 2020    Postpartum thyroiditis 2018    Hashimoto's thyroiditis 2018    Acquired hypothyroidism 2018    History of hyperthyroidism 2018    Class 2 obesity with body mass index (BMI) of 38.0 to 38.9 in adult 2018    GDM (gestational diabetes mellitus), class A1 01/10/2018    Psychophysiological insomnia 2017    Allergic rhinitis 2016    Multinodular goiter 2011    Mixed hyperlipidemia 2011    Vitamin D deficiency 2011    Anxiety 2011    Elevated blood pressure reading without diagnosis of hypertension 05/02/2011     Past Surgical History:   Procedure Laterality Date    CYST REMOVAL  07/18/2018    Scalp    EYE SURGERY      WISDOM TOOTH EXTRACTION       Family History   Problem Relation Age of Onset    High Blood Pressure Mother     High Cholesterol Mother     High Blood Pressure Father     High Cholesterol Father     Heart Disease Father     High Cholesterol Brother     Heart Disease Paternal Aunt     Stroke Paternal Uncle     Heart Disease Paternal Uncle     Stroke Maternal Grandmother     Arthritis Paternal Grandmother     Heart Disease Paternal Grandfather      Social History     Socioeconomic History    Marital status:      Spouse name: None    Number of children: None    Years of education: None    Highest education level: None   Occupational History    None   Tobacco Use    Smoking status: Never Smoker    Smokeless tobacco: Never Used   Vaping Use    Vaping Use: Never used   Substance and Sexual Activity    Alcohol use: Yes     Comment: 1-2 per month on average    Drug use: No    Sexual activity: Yes   Other Topics Concern    None   Social History Narrative    None     Social Determinants of Health     Financial Resource Strain:     Difficulty of Paying Living Expenses:    Food Insecurity:     Worried About Running Out of Food in the Last Year:     Ran Out of Food in the Last Year:    Transportation Needs:     Lack of Transportation (Medical):      Lack of Transportation (Non-Medical):    Physical Activity:     Days of Exercise per Week:     Minutes of Exercise per Session:    Stress:     Feeling of Stress :    Social Connections:     Frequency of Communication with Friends and Family:     Frequency of Social Gatherings with Friends and Family:     Attends L[employer] Services:     Active Member of Clubs or Organizations:     Attends Club or Organization Meetings:     Marital Status:    Intimate Partner Violence:     Fear of Current or Ex-Partner:     Emotionally Abused:     Physically Abused:     Sexually Abused:      Current Outpatient Medications   Medication Sig Dispense Refill    atorvastatin (LIPITOR) 10 MG tablet TAKE 1 TABLET BY MOUTH EVERY DAY 90 tablet 1    levothyroxine (SYNTHROID) 25 MCG tablet TAKE 1 TABLET BY MOUTH EVERY DAY 90 tablet 0    fluticasone (FLONASE) 50 MCG/ACT nasal spray SPRAY 1 SPRAY INTO EACH NOSTRIL EVERY DAY 1 Bottle 1    triamcinolone (KENALOG) 0.025 % cream Apply to affected area on left armpit twice daily for up to 1 week then stop for 1 week prn flares 15 g 1    traZODone (DESYREL) 50 MG tablet TAKE 1 TABLET BY MOUTH EVERY DAY AT BEDTIME AS NEEDED FOR SLEEP 90 tablet 1    esomeprazole (NEXIUM) 20 MG delayed release capsule Take 20 mg by mouth every morning (before breakfast)      cetirizine (ZYRTEC) 10 MG tablet Take by mouth      Flaxseed, Linseed, 1000 MG CAPS Take  by mouth.  fish oil-omega-3 fatty acids 1000 MG capsule Take 1,600 mg by mouth daily       multivitamin (THERAGRAN) per tablet Take 1 tablet by mouth daily.  Cholecalciferol (VITAMIN D) 2000 units CAPS capsule Take 2,000 Units by mouth daily.  azithromycin (ZITHROMAX Z-KATIANA) 250 MG tablet As directed on pack (Patient not taking: Reported on 2021) 1 packet 0    promethazine-dextromethorphan (PROMETHAZINE-DM) 6.25-15 MG/5ML syrup Take 5 mLs by mouth 4 times daily as needed for Cough (coughing. .caution drowsiness) (Patient not taking: Reported on 2021) 180 mL 2    albuterol sulfate HFA (PROAIR HFA) 108 (90 Base) MCG/ACT inhaler Inhale 2 puffs into the lungs every 6 hours as needed for Wheezing (Patient not taking: Reported on 7/15/2021) 1 Inhaler 3     No current facility-administered medications for this visit.      Allergies   Allergen Reactions    Prednisone      flushing     Family Status   Relation Name Status    Mother  Alive    Father      Brother  [address] one  from sudden death   [facility]      MGM      PGM  Alive    PGF      MGF  Alive        thyroid, gout    Brother         Review of Systems:  Constitutional: has fatigue, no fever, has recent weight gain, no recent weight loss, no changes in appetite  Eyes: no eye pain, no change in vision, no eye redness, no eye irritation, no double vision  Ears, nose, throat: no nasal congestion, no sore throat, no earache, no decrease in hearing, no hoarseness, no dry mouth, no sinus problems, no difficulty swallowing, has neck lumps, no dental problems, no mouth sores, no ringing in ears  Pulmonary: no shortness of breath, no wheezing, no dyspnea on exertion, no cough  Cardiovascular: no chest pain, no lower extremity edema, no orthopnea, no intermittent leg claudication, no palpitations  Gastrointestinal: no abdominal pain, no nausea, no vomiting, no diarrhea, no constipation, no dysphagia, no heartburn, no bloating  Genitourinary: no dysuria, no urinary incontinence, no urinary hesitancy, no urinary frequency, no feelings of urinary urgency, no nocturia  Musculoskeletal: no joint swelling, no joint stiffness, no joint pain, no muscle cramps, no muscle pain, no bone pain  Integument/Breast: has hair loss, no skin rashes, no skin lesions, no itching, no dry skin  Neurological: no numbness, no tingling, no weakness, no confusion, no headaches, no dizziness, no fainting, no tremors, no decrease in memory, no balance problems  Psychiatric: has anxiety, no depression, has insomnia  Hematologic/Lymphatic: no tendency for easy bleeding, no swollen lymph nodes, no tendency for easy bruising  Immunology: has seasonal allergies, no frequent infections, no frequent illnesses  Endocrine: no temperature intolerance    /78   Pulse 81   Temp 98 °F (36.7 °C)   Resp 14   Ht 5' 6\" (1.676 m)   Wt 227 lb (103 kg)   SpO2 98%   BMI 36.64 kg/m²    Wt Readings from Last 3 Encounters: 07/15/21 227 lb (103 kg)   09/10/20 237 lb (107.5 kg)   01/17/20 235 lb 12.8 oz (107 kg)     Body mass index is 36.64 kg/m².     OBJECTIVE:  Constitutional: no acute distress, well appearing and well nourished  Psychiatric: oriented to person, place and time, judgement and insight and normal, recent and remote memory and intact and mood and affect are normal  Skin: skin and subcutaneous tissue is normal without mass, normal turgor  Head and Face: examination of head and face revealed no abnormalities  Eyes: no lid or conjunctival swelling, erythema or discharge, pupils are normal, equal, round, reactive to light  Ears/Nose: external inspection of ears and nose revealed no abnormalities, hearing is grossly normal  Oropharynx/Mouth/Face: lips, tongue and gums are normal with no lesions, the voice quality was normal  Neck: neck is supple and symmetric, with midline trachea and no masses, thyroid is enlarged  Lymphatics: normal cervical lymph nodes, normal supraclavicular nodes  Pulmonary: no increased work of breathing or signs of respiratory distress, lungs are clear to auscultation  Cardiovascular: normal heart rate and rhythm, normal S1 and S2, no murmurs and pedal pulses and 2+ bilaterally, No edema  Abdomen: abdomen is soft, non-tender with no masses  Musculoskeletal: normal gait and station and exam of the digits and nails are normal  Neurological: normal coordination and normal general cortical function      Lab Review:    Lab Results   Component Value Date    WBC 6.9 09/03/2020    HGB 13.8 09/03/2020    HCT 40.9 09/03/2020    MCV 87.8 09/03/2020     09/03/2020     Lab Results   Component Value Date     05/12/2021    K 4.7 05/12/2021     05/12/2021    CO2 28 05/12/2021    BUN 11 05/12/2021    CREATININE 0.6 05/12/2021    GLUCOSE 89 05/12/2021    GLUCOSE 83 10/22/2011    CALCIUM 10.1 05/12/2021    PROT 7.5 05/12/2021    PROT 7.1 10/22/2011    LABALBU 4.9 05/12/2021    BILITOT 0.4 05/12/2021 ALKPHOS 58 05/12/2021    AST 25 05/12/2021    ALT 28 05/12/2021    LABGLOM >60 05/12/2021    GFRAA >60 05/12/2021    GFRAA 98 10/22/2011    AGRATIO 1.9 05/12/2021    GLOB 2.6 05/12/2021     Lab Results   Component Value Date    TSH 1.54 05/12/2021    FT3 3.1 05/12/2021     Lab Results   Component Value Date    LABA1C 5.4 09/03/2020     Lab Results   Component Value Date    .3 09/03/2020     Lab Results   Component Value Date    CHOL 172 05/12/2021     Lab Results   Component Value Date    TRIG 123 05/12/2021     Lab Results   Component Value Date    HDL 55 05/12/2021    HDL 51 03/14/2012     Lab Results   Component Value Date    LDLCALC 92 05/12/2021     Lab Results   Component Value Date    LABVLDL 25 05/12/2021     No results found for: CHOLHDLRATIO  No results found for: Orene Alpers  Lab Results   Component Value Date    VITD25 59.8 05/12/2021        ASSESSMENT/PLAN:    1. Hypothyroidism  TSH 4.63-3.42-1.54  TSH 1.6 on 0.025 mg, TSH-0.69 on 0.05 mg  Continue levothyroxine 0.025 mg. Follow TSH    2.  Obesity   Diet, exercise. Tried HCG diet, weight watchers, low carb. On [employer] diet, lost 12 pounds in few months, effective. 3. Multinodular goiter  Follow sonogram with Dr. Saranya Giordano. Stable heterogenous MNG with cyto nondiagnostic R inf predominately cystic 1.1cm (prior1.4cm) with R sup solid 1.5cm (prior 1.7cm) and L inf solid hypo 1.1cm without suspicious features. Bilateral infrathyroid LNs benign appearing likely reactive. 4. Hashimoto's thyroiditis  Follow TSH. 5. Postpartum thyroiditis  Follow TSH. 6. History of Hyperthyroidism  - T3, Free; Future  - T4, Free; Future  - TSH without Reflex; Future    7. IFG   Glucose 189-41-99  Hx of gestational diabetes. Prediabetes education    8. Vitamin D deficiency  25OHVitamin D 49.8-59.8  Continue vit D 2000 IU daily    9.  Hyperlipidemia  - lipid panel  - Diet, exercise  -92  Lipitor 10 mg daily    Reviewed and/or ordered clinical lab results Yes  Reviewed and/or ordered radiology tests Yes   Reviewed and/or ordered other diagnostic tests No  Discussed test results with performing physician No  Independently reviewed image, tracing, or specimen No  Made a decision to obtain old records No  Reviewed and summarized old records Yes  In 2001 she was diagnosed with 1.5 cm cystic nodule. Repeated US - right 1.4 cm, right superior 1.7 cm, left subcm nodules. TSH 7/21/2018 <0.01  TSH 7/2016 2.36  TSH 8/2018 6.12  TSH 10/14/2018 4.63  11/7/2018 TSH 3.42  Obtained history from other than patient No    Tyesha Jo was counseled regarding symptoms of thyroid diagnosis, course and complications of disease if inadequately treated, side effects of medications, diagnosis, treatment options, and prognosis, risks, benefits, complications, and alternatives of treatment, labs, imaging and other studies and treatment targets and goals, thyorid nodules, hyperthyroidism causes treatment. .  She understands instructions and counseling. Return in about 4 months (around 11/15/2021) for thyroid problems.

## 2021-08-02 NOTE — PATIENT INSTRUCTIONS

## 2021-08-03 ENCOUNTER — PROCEDURE VISIT (OUTPATIENT)
Dept: DERMATOLOGY | Age: 36
End: 2021-08-03

## 2021-08-03 VITALS — TEMPERATURE: 98.6 F

## 2021-08-03 DIAGNOSIS — Z41.1 ELECTIVE PROCEDURE FOR UNACCEPTABLE COSMETIC APPEARANCE: ICD-10-CM

## 2021-08-03 DIAGNOSIS — R68.89 UNWANTED HAIR: Primary | ICD-10-CM

## 2021-08-03 PROCEDURE — DM01310 VBEAM LASER SMALL OR 2 AREAS: Performed by: DERMATOLOGY

## 2021-08-03 NOTE — PROGRESS NOTES
and Allergies reviewed. Family History   Problem Relation Age of Onset    High Blood Pressure Mother     High Cholesterol Mother     High Blood Pressure Father     High Cholesterol Father     Heart Disease Father     High Cholesterol Brother     Heart Disease Paternal Aunt     Stroke Paternal Uncle     Heart Disease Paternal Uncle     Stroke Maternal Grandmother     Arthritis Paternal Grandmother     Heart Disease Paternal Grandfather      Past Medical History:   Diagnosis Date    Acquired hypothyroidism 8/26/2018    Allergic rhinitis     Goiter, non-toxic     Hypertension     Hyperthyroidism 8/20/2018     Past Surgical History:   Procedure Laterality Date    CYST REMOVAL  07/18/2018    Scalp    EYE SURGERY      WISDOM TOOTH EXTRACTION         Allergies   Allergen Reactions    Prednisone      flushing     Outpatient Medications Marked as Taking for the 8/3/21 encounter (Procedure visit) with Dequan Stallworth DO   Medication Sig Dispense Refill    atorvastatin (LIPITOR) 10 MG tablet TAKE 1 TABLET BY MOUTH EVERY DAY 90 tablet 1    levothyroxine (SYNTHROID) 25 MCG tablet TAKE 1 TABLET BY MOUTH EVERY DAY 90 tablet 0    fluticasone (FLONASE) 50 MCG/ACT nasal spray SPRAY 1 SPRAY INTO EACH NOSTRIL EVERY DAY 1 Bottle 1    triamcinolone (KENALOG) 0.025 % cream Apply to affected area on left armpit twice daily for up to 1 week then stop for 1 week prn flares 15 g 1    traZODone (DESYREL) 50 MG tablet TAKE 1 TABLET BY MOUTH EVERY DAY AT BEDTIME AS NEEDED FOR SLEEP 90 tablet 1    esomeprazole (NEXIUM) 20 MG delayed release capsule Take 20 mg by mouth every morning (before breakfast)      cetirizine (ZYRTEC) 10 MG tablet Take by mouth      Flaxseed, Linseed, 1000 MG CAPS Take  by mouth.  fish oil-omega-3 fatty acids 1000 MG capsule Take 1,600 mg by mouth daily       multivitamin (THERAGRAN) per tablet Take 1 tablet by mouth daily.         Cholecalciferol (VITAMIN D) 2000 units CAPS capsule Take 2,000 Units by mouth daily. Social History:   Social History     Socioeconomic History    Marital status:      Spouse name: Not on file    Number of children: Not on file    Years of education: Not on file    Highest education level: Not on file   Occupational History    Not on file   Tobacco Use    Smoking status: Never Smoker    Smokeless tobacco: Never Used   Vaping Use    Vaping Use: Never used   Substance and Sexual Activity    Alcohol use: Yes     Comment: 1-2 per month on average    Drug use: No    Sexual activity: Yes   Other Topics Concern    Not on file   Social History Narrative    Not on file     Social Determinants of Health     Financial Resource Strain:     Difficulty of Paying Living Expenses:    Food Insecurity:     Worried About Running Out of Food in the Last Year:     [address] in the Last Year:    Transportation Needs:     Lack of Transportation (Medical):  Lack of Transportation (Non-Medical):    Physical Activity:     Days of Exercise per Week:     Minutes of Exercise per Session:    Stress:     Feeling of Stress :    Social Connections:     Frequency of Communication with Friends and Family:     Frequency of Social Gatherings with Friends and Family:     Attends Alevism Services:     Active Member of Clubs or Organizations:     Attends Club or Organization Meetings:     Marital Status:    Intimate Partner Violence:     Fear of Current or Ex-Partner:     Emotionally Abused:     Physically Abused:     Sexually Abused:        Physical Examination     Well appearing, NAD, A&Ox3  -few scattered shaved dark terminal hairs to cutaneous upper lip, chin, inferior cheeks     1.  TREATMENT # 2  AREA TO BE TREATED:upper cutaneous lip, chin, inferior cheeks  DIAGNOSIS, LOCATION, PROCEDURE RECONFIRMED: yes  EYE PROTECTION: yes  ANESTHESIA/PRE-OP MEDICATIONS: none  LASER SETTINGS:  (1)  WAVELENGTH: 755 LENS:18mm spot size FLUENCE: 20 J/cm2 COOLIN/40  Assessment and Plan     1. Unwanted hair    2. Elective procedure for unacceptable cosmetic appearance        1. Unwanted hair  Gentlelase alexandrite laser treatment as outlined above. Patient educated to wear sunscreen of at least SPF 30 daily to face, neck, chest and other sun exposed areas    POST-OPERATIVE CARE/DISPOSITION: aquaphor and ice,  patient tolerated procedure well, left in stable condition  COMPLICATIONS: none  MEDICATIONS: none  WOUND CARE INSTRUCTIONS PROVIDED: yes, patient cooled treated areas with ice pack for 5 minutes before leaving, Instructed to ice the area for 5 minutes every hour for 5 hours at home    Discussed with patient that she will have redness, discoloration, swelling, and likely bruising and may take 2-3 weeks to resolve. She is to call the office if she experiences any adverse side effects that are concerning to her.    -Patient advised to call the office and schedule for 1 month follow-up appointment for an additional [address] [place] [address]. treatment if she feels she would like further improvement, otherwise if she is satisfied with decreased density in hair to the areas treated, she may block her wax the remaining hairs as needed.    -Semipermanent hair treatment, recommend yearly hair laser treatment as needed for maintenance.     $200    2. Elective procedure for unacceptable cosmetic appearance  See above gentlelase treatment         Note is transcribed using voice recognition software. Inadvertent computerized transcription errors may be present.     Return for 1 month gentlelase hair removal .

## 2021-08-05 ENCOUNTER — OFFICE VISIT (OUTPATIENT)
Dept: FAMILY MEDICINE CLINIC | Age: 36
End: 2021-08-05
Payer: COMMERCIAL

## 2021-08-05 VITALS
OXYGEN SATURATION: 98 % | HEIGHT: 66 IN | WEIGHT: 219 LBS | HEART RATE: 82 BPM | RESPIRATION RATE: 16 BRPM | BODY MASS INDEX: 35.2 KG/M2 | SYSTOLIC BLOOD PRESSURE: 128 MMHG | TEMPERATURE: 98.1 F | DIASTOLIC BLOOD PRESSURE: 88 MMHG

## 2021-08-05 DIAGNOSIS — Z00.00 ANNUAL PHYSICAL EXAM: Primary | ICD-10-CM

## 2021-08-05 PROCEDURE — 99395 PREV VISIT EST AGE 18-39: CPT | Performed by: FAMILY MEDICINE

## 2021-08-05 SDOH — ECONOMIC STABILITY: FOOD INSECURITY: WITHIN THE PAST 12 MONTHS, THE FOOD YOU BOUGHT JUST DIDN'T LAST AND YOU DIDN'T HAVE MONEY TO GET MORE.: NEVER TRUE

## 2021-08-05 SDOH — ECONOMIC STABILITY: TRANSPORTATION INSECURITY
IN THE PAST 12 MONTHS, HAS LACK OF TRANSPORTATION KEPT YOU FROM MEETINGS, WORK, OR FROM GETTING THINGS NEEDED FOR DAILY LIVING?: NO

## 2021-08-05 SDOH — ECONOMIC STABILITY: TRANSPORTATION INSECURITY
IN THE PAST 12 MONTHS, HAS THE LACK OF TRANSPORTATION KEPT YOU FROM MEDICAL APPOINTMENTS OR FROM GETTING MEDICATIONS?: NO

## 2021-08-05 SDOH — ECONOMIC STABILITY: FOOD INSECURITY: WITHIN THE PAST 12 MONTHS, YOU WORRIED THAT YOUR FOOD WOULD RUN OUT BEFORE YOU GOT MONEY TO BUY MORE.: NEVER TRUE

## 2021-08-05 SDOH — ECONOMIC STABILITY: INCOME INSECURITY: IN THE LAST 12 MONTHS, WAS THERE A TIME WHEN YOU WERE NOT ABLE TO PAY THE MORTGAGE OR RENT ON TIME?: NO

## 2021-08-05 SDOH — ECONOMIC STABILITY: HOUSING INSECURITY
IN THE LAST 12 MONTHS, WAS THERE A TIME WHEN YOU DID NOT HAVE A STEADY PLACE TO SLEEP OR SLEPT IN A SHELTER (INCLUDING NOW)?: NO

## 2021-08-05 ASSESSMENT — PATIENT HEALTH QUESTIONNAIRE - PHQ9
SUM OF ALL RESPONSES TO PHQ9 QUESTIONS 1 & 2: 0
SUM OF ALL RESPONSES TO PHQ QUESTIONS 1-9: 0
SUM OF ALL RESPONSES TO PHQ QUESTIONS 1-9: 0
1. LITTLE INTEREST OR PLEASURE IN DOING THINGS: 0
SUM OF ALL RESPONSES TO PHQ QUESTIONS 1-9: 0
2. FEELING DOWN, DEPRESSED OR HOPELESS: 0

## 2021-08-05 ASSESSMENT — ENCOUNTER SYMPTOMS
SHORTNESS OF BREATH: 0
BACK PAIN: 0
COLOR CHANGE: 0
ABDOMINAL PAIN: 0

## 2021-08-05 ASSESSMENT — SOCIAL DETERMINANTS OF HEALTH (SDOH): HOW HARD IS IT FOR YOU TO PAY FOR THE VERY BASICS LIKE FOOD, HOUSING, MEDICAL CARE, AND HEATING?: NOT HARD AT ALL

## 2021-08-05 NOTE — PROGRESS NOTES
Mary Umana  YOB: 1985    Date of Service:  8/5/2021    Chief Complaint:   Mary Umana is a 43 y.o. female who presents for complete physical examination.     HPI:  HPI    Hx abnormal PAP: no  Sexual activity: has sex with males   Self-breast exams: yes  Previous DEXA scan: no  Last eye exam: December 2020, normal  Exercise: walks 3 time(s) per week, swimming, and biking  Seatbelt use: yes  Are You a Spiritual Person: no  Advance Directive: no    Wt Readings from Last 3 Encounters:   08/05/21 219 lb (99.3 kg)   07/15/21 227 lb (103 kg)   09/10/20 237 lb (107.5 kg)     BP Readings from Last 3 Encounters:   08/05/21 128/88   07/15/21 110/78   09/10/20 120/78       Patient Active Problem List   Diagnosis    Multinodular goiter    Mixed hyperlipidemia    Vitamin D deficiency    Anxiety    Elevated blood pressure reading without diagnosis of hypertension    Allergic rhinitis    Psychophysiological insomnia    GDM (gestational diabetes mellitus), class A1    History of hyperthyroidism    Class 2 obesity with body mass index (BMI) of 38.0 to 38.9 in adult    Postpartum thyroiditis    Hashimoto's thyroiditis    Acquired hypothyroidism    IFG (impaired fasting glucose)    COVID-19       Health Maintenance   Topic Date Due    Hepatitis C screen  Never done    Varicella vaccine (1 of 2 - 2-dose childhood series) Never done    HIV screen  Never done    Cervical cancer screen  12/04/2016    Flu vaccine (1) 09/01/2021    A1C test (Diabetic or Prediabetic)  09/03/2021    Lipid screen  05/12/2022    TSH testing  05/12/2022    DTaP/Tdap/Td vaccine (5 - Td or Tdap) 03/01/2028    COVID-19 Vaccine  Completed    Hepatitis A vaccine  Aged Out    Hepatitis B vaccine  Aged Out    Hib vaccine  Aged Out    Meningococcal (ACWY) vaccine  Aged Out    Pneumococcal 0-64 years Vaccine  Aged [employer] History   Administered Date(s) Administered    COVID-19, Moderna, PF, 100mcg/0.5mL 03/23/2021, 04/20/2021    HPV Quadrivalent (Gardasil) 09/02/2010    Influenza Vaccine, unspecified formulation 11/11/2016    Influenza Virus Vaccine 09/12/2018    Influenza, MDCK Quadv, with preserv IM (Flucelvax 4 yrs and older) 12/31/2019, 12/31/2019, 12/02/2020, 12/02/2020    Influenza, Christa Hurt, IM, (6 mo and older Fluzone, Flulaval, Fluarix and 3 yrs and older Afluria) 12/02/2020    MMR 08/03/2016    PPD Test 06/29/2011    Tdap (Boostrix, Adacel) 05/02/2006, 07/01/2011, 05/01/2015, 03/01/2018       Allergies   Allergen Reactions    Prednisone      flushing     Outpatient Medications Marked as Taking for the 8/5/21 encounter (Office Visit) with Chidi Vasquez DO   Medication Sig Dispense Refill    atorvastatin (LIPITOR) 10 MG tablet TAKE 1 TABLET BY MOUTH EVERY DAY 90 tablet 1    levothyroxine (SYNTHROID) 25 MCG tablet TAKE 1 TABLET BY MOUTH EVERY DAY 90 tablet 0    fluticasone (FLONASE) 50 MCG/ACT nasal spray SPRAY 1 SPRAY INTO EACH NOSTRIL EVERY DAY 1 Bottle 1    triamcinolone (KENALOG) 0.025 % cream Apply to affected area on left armpit twice daily for up to 1 week then stop for 1 week prn flares 15 g 1    traZODone (DESYREL) 50 MG tablet TAKE 1 TABLET BY MOUTH EVERY DAY AT BEDTIME AS NEEDED FOR SLEEP 90 tablet 1    esomeprazole (NEXIUM) 20 MG delayed release capsule Take 20 mg by mouth every morning (before breakfast)      cetirizine (ZYRTEC) 10 MG tablet Take by mouth      Flaxseed, Linseed, 1000 MG CAPS Take  by mouth.  fish oil-omega-3 fatty acids 1000 MG capsule Take 1,600 mg by mouth daily       multivitamin (THERAGRAN) per tablet Take 1 tablet by mouth daily.  Cholecalciferol (VITAMIN D) 2000 units CAPS capsule Take 2,000 Units by mouth daily.            Past Medical History:   Diagnosis Date    Acquired hypothyroidism 8/26/2018    Allergic rhinitis     Goiter, non-toxic     Hypertension     Hyperthyroidism 8/20/2018     Past Surgical History:   Procedure Laterality Date    CYST REMOVAL  07/18/2018    Scalp    EYE SURGERY      WISDOM TOOTH EXTRACTION       Family History   Problem Relation Age of Onset    High Blood Pressure Mother     High Cholesterol Mother     High Blood Pressure Father     High Cholesterol Father     Heart Disease Father     High Cholesterol Brother     Heart Disease Paternal Aunt     Stroke Paternal Uncle     Heart Disease Paternal Uncle     Stroke Maternal Grandmother     Arthritis Paternal Grandmother     Heart Disease Paternal Grandfather      Social History     Socioeconomic History    Marital status:      Spouse name: Not on file    Number of children: Not on file    Years of education: Not on file    Highest education level: Not on file   Occupational History    Not on file   Tobacco Use    Smoking status: Never Smoker    Smokeless tobacco: Never Used   Vaping Use    Vaping Use: Never used   Substance and Sexual Activity    Alcohol use: Yes     Comment: 1-2 per month on average    Drug use: No    Sexual activity: Yes   Other Topics Concern    Not on file   Social History Narrative    Not on file     Social Determinants of Health     Financial Resource Strain: Low Risk     Difficulty of Paying Living Expenses: Not hard at all   Food Insecurity: No Food Insecurity    Worried About Running Out of Food in the Last Year: Never true    Anjana of Food in the Last Year: Never true   Transportation Needs: No Transportation Needs    Lack of Transportation (Medical): No    Lack of Transportation (Non-Medical):  No   Physical Activity:     Days of Exercise per Week:     Minutes of Exercise per Session:    Stress:     Feeling of Stress :    Social Connections:     Frequency of Communication with Friends and Family:     Frequency of Social Gatherings with Friends and Family:     Attends Caodaism Services:     Active Member of Clubs or Organizations:     Attends Club or Organization Meetings:     Marital Status: Intimate Partner Violence:     Fear of Current or Ex-Partner:     Emotionally Abused:     Physically Abused:     Sexually Abused:        Reviewof Systems:  Review of Systems   Constitutional: Negative for fatigue. HENT: Negative for congestion. Eyes: Negative for visual disturbance. Respiratory: Negative for shortness of breath. Cardiovascular: Negative for chest pain. Gastrointestinal: Negative for abdominal pain. Endocrine: Negative for cold intolerance and heat intolerance. Genitourinary: Negative for difficulty urinating. Musculoskeletal: Negative for back pain and neck pain. Skin: Negative for color change. Allergic/Immunologic: Positive for environmental allergies. Neurological: Negative for dizziness and headaches. Hematological: Does not bruise/bleed easily. Psychiatric/Behavioral: Negative for dysphoric mood. The patient is not nervous/anxious. PhysicalExam:   Vitals:    08/05/21 1618   BP: 128/88   Site: Left Upper Arm   Position: Sitting   Pulse: 82   Resp: 16   Temp: 98.1 °F (36.7 °C)   TempSrc: Temporal   SpO2: 98%   Weight: 219 lb (99.3 kg)   Height: 5' 6\" (1.676 m)     Body mass index is 35.35 kg/m². Physical Exam  Vitals reviewed. Constitutional:       Appearance: She is well-developed. HENT:      Head: Normocephalic and atraumatic. Right Ear: External ear normal.      Left Ear: External ear normal.      Nose: Nose normal.   Eyes:      Pupils: Pupils are equal, round, and reactive to light. Cardiovascular:      Rate and Rhythm: Normal rate and regular rhythm. Heart sounds: Normal heart sounds. Pulmonary:      Effort: Pulmonary effort is normal.      Breath sounds: Normal breath sounds. Abdominal:      General: Bowel sounds are normal.      Palpations: Abdomen is soft. Musculoskeletal:         General: Normal range of motion. Cervical back: Normal range of motion. Skin:     General: Skin is warm and dry.    Neurological: Mental Status: She is alert and oriented to person, place, and time. Deep Tendon Reflexes: Reflexes are normal and symmetric. Psychiatric:         Behavior: Behavior normal.         Thought Content: Thought content normal.         Judgment: Judgment normal.         Assessment/Plan:   Diagnosis Orders   1. Annual physical exam       Return in about 1 year (around 8/5/2022) for Physical Exam.    Prior to Visit Medications    Medication Sig Taking? Authorizing Provider   atorvastatin (LIPITOR) 10 MG tablet TAKE 1 TABLET BY MOUTH EVERY DAY Yes Abbey Records, DO   levothyroxine (SYNTHROID) 25 MCG tablet TAKE 1 TABLET BY MOUTH EVERY DAY Yes Edison Golden MD   fluticasone (FLONASE) 50 MCG/ACT nasal spray SPRAY 1 SPRAY INTO EACH NOSTRIL EVERY DAY Yes Abbey Records, DO   triamcinolone (KENALOG) 0.025 % cream Apply to affected area on left armpit twice daily for up to 1 week then stop for 1 week prn flares Yes Beni Buiier, DO   traZODone (DESYREL) 50 MG tablet TAKE 1 TABLET BY MOUTH EVERY DAY AT BEDTIME AS NEEDED FOR SLEEP Yes Abbey Records, DO   esomeprazole (NEXIUM) 20 MG delayed release capsule Take 20 mg by mouth every morning (before breakfast) Yes Historical Provider, MD   cetirizine (ZYRTEC) 10 MG tablet Take by mouth Yes Historical Provider, MD   Flaxseed, Linseed, 1000 MG CAPS Take  by mouth. Yes Historical Provider, MD   fish oil-omega-3 fatty acids 1000 MG capsule Take 1,600 mg by mouth daily  Yes Historical Provider, MD   multivitamin [facility]) per tablet Take 1 tablet by mouth daily. Yes Historical Provider, MD   Cholecalciferol (VITAMIN D) 2000 units CAPS capsule Take 2,000 Units by mouth daily.    Yes Historical Provider, MD

## 2021-09-11 ENCOUNTER — E-VISIT (OUTPATIENT)
Dept: FAMILY MEDICINE CLINIC | Age: 36
End: 2021-09-11
Payer: COMMERCIAL

## 2021-09-11 DIAGNOSIS — L08.9 STAPH SKIN INFECTION: Primary | ICD-10-CM

## 2021-09-11 DIAGNOSIS — E03.9 ACQUIRED HYPOTHYROIDISM: ICD-10-CM

## 2021-09-11 DIAGNOSIS — B95.8 STAPH SKIN INFECTION: Primary | ICD-10-CM

## 2021-09-11 PROCEDURE — 98970 NQHP OL DIG ASSMT&MGMT 5-10: CPT | Performed by: NURSE PRACTITIONER

## 2021-09-11 RX ORDER — SULFAMETHOXAZOLE AND TRIMETHOPRIM 800; 160 MG/1; MG/1
1 TABLET ORAL 2 TIMES DAILY
Qty: 14 TABLET | Refills: 0 | Status: SHIPPED | OUTPATIENT
Start: 2021-09-11 | End: 2021-09-18

## 2021-09-13 RX ORDER — LEVOTHYROXINE SODIUM 0.03 MG/1
TABLET ORAL
Qty: 90 TABLET | Refills: 0 | Status: SHIPPED | OUTPATIENT
Start: 2021-09-13 | End: 2021-11-30

## 2021-09-13 NOTE — TELEPHONE ENCOUNTER
Requested Prescriptions     Pending Prescriptions Disp Refills    levothyroxine (SYNTHROID) 25 MCG tablet [Pharmacy Med Name: LEVOTHYROXINE 25 MCG TABLET] 90 tablet 0     Sig: TAKE 1 TABLET BY MOUTH EVERY DAY     LAST OV 7/15/21  NEXT OV 11/30/21  LAST REFILL 6/21/2021  RECENT LAB 5/12/21

## 2021-09-20 RX ORDER — TRAZODONE HYDROCHLORIDE 50 MG/1
TABLET ORAL
Qty: 90 TABLET | Refills: 1 | Status: SHIPPED | OUTPATIENT
Start: 2021-09-20 | End: 2022-03-30 | Stop reason: SDUPTHER

## 2021-09-20 NOTE — TELEPHONE ENCOUNTER
Future Appointments   Date Time Provider Andrea Daigle   11/30/2021  1:30 PM MD Jacquelin Lunaf [facility]     LOV 8/5/2021

## 2021-10-26 DIAGNOSIS — B97.89 VIRAL SINUSITIS: ICD-10-CM

## 2021-10-26 DIAGNOSIS — J32.9 VIRAL SINUSITIS: ICD-10-CM

## 2021-10-26 RX ORDER — FLUTICASONE PROPIONATE 50 MCG
SPRAY, SUSPENSION (ML) NASAL
Qty: 16 G | Refills: 1 | Status: SHIPPED | OUTPATIENT
Start: 2021-10-26 | End: 2022-03-30 | Stop reason: SDUPTHER

## 2021-11-24 DIAGNOSIS — E03.9 ACQUIRED HYPOTHYROIDISM: ICD-10-CM

## 2021-11-24 DIAGNOSIS — E55.9 VITAMIN D DEFICIENCY: ICD-10-CM

## 2021-11-24 DIAGNOSIS — E78.2 MIXED HYPERLIPIDEMIA: ICD-10-CM

## 2021-11-24 DIAGNOSIS — R73.01 IFG (IMPAIRED FASTING GLUCOSE): ICD-10-CM

## 2021-11-24 DIAGNOSIS — E06.3 HASHIMOTO'S THYROIDITIS: ICD-10-CM

## 2021-11-24 DIAGNOSIS — Z86.39 HISTORY OF HYPERTHYROIDISM: ICD-10-CM

## 2021-11-24 LAB
A/G RATIO: 1.8 (ref 1.1–2.2)
ALBUMIN SERPL-MCNC: 4.5 G/DL (ref 3.4–5)
ALP BLD-CCNC: 58 U/L (ref 40–129)
ALT SERPL-CCNC: 17 U/L (ref 10–40)
ANION GAP SERPL CALCULATED.3IONS-SCNC: 10 MMOL/L (ref 3–16)
ANTI-THYROGLOB ABS: 64 IU/ML
AST SERPL-CCNC: 16 U/L (ref 15–37)
BILIRUB SERPL-MCNC: 0.5 MG/DL (ref 0–1)
BUN BLDV-MCNC: 14 MG/DL (ref 7–20)
CALCIUM SERPL-MCNC: 9.4 MG/DL (ref 8.3–10.6)
CHLORIDE BLD-SCNC: 101 MMOL/L (ref 99–110)
CHOLESTEROL, TOTAL: 112 MG/DL (ref 0–199)
CO2: 28 MMOL/L (ref 21–32)
CREAT SERPL-MCNC: 0.8 MG/DL (ref 0.6–1.1)
GFR AFRICAN AMERICAN: >60
GFR NON-AFRICAN AMERICAN: >60
GLUCOSE BLD-MCNC: 93 MG/DL (ref 70–99)
HDLC SERPL-MCNC: 43 MG/DL (ref 40–60)
LDL CHOLESTEROL CALCULATED: 60 MG/DL
POTASSIUM SERPL-SCNC: 4.5 MMOL/L (ref 3.5–5.1)
SODIUM BLD-SCNC: 139 MMOL/L (ref 136–145)
T3 FREE: 2.7 PG/ML (ref 2.3–4.2)
T4 FREE: 1.2 NG/DL (ref 0.9–1.8)
THYROID PEROXIDASE (TPO) ABS: 288 IU/ML
TOTAL PROTEIN: 7 G/DL (ref 6.4–8.2)
TRIGL SERPL-MCNC: 46 MG/DL (ref 0–150)
TSH SERPL DL<=0.05 MIU/L-ACNC: 0.91 UIU/ML (ref 0.27–4.2)
VITAMIN D 25-HYDROXY: 71.6 NG/ML
VLDLC SERPL CALC-MCNC: 9 MG/DL

## 2021-11-25 LAB
ESTIMATED AVERAGE GLUCOSE: 96.8 MG/DL
HBA1C MFR BLD: 5 %

## 2021-11-30 ENCOUNTER — OFFICE VISIT (OUTPATIENT)
Dept: ENDOCRINOLOGY | Age: 36
End: 2021-11-30
Payer: COMMERCIAL

## 2021-11-30 VITALS
HEIGHT: 66 IN | TEMPERATURE: 98 F | RESPIRATION RATE: 14 BRPM | WEIGHT: 191 LBS | SYSTOLIC BLOOD PRESSURE: 128 MMHG | OXYGEN SATURATION: 98 % | HEART RATE: 74 BPM | DIASTOLIC BLOOD PRESSURE: 76 MMHG | BODY MASS INDEX: 30.7 KG/M2

## 2021-11-30 DIAGNOSIS — Z86.39 HISTORY OF HYPERTHYROIDISM: ICD-10-CM

## 2021-11-30 DIAGNOSIS — L65.9 HAIR LOSS: ICD-10-CM

## 2021-11-30 DIAGNOSIS — E66.9 CLASS 1 OBESITY WITH BODY MASS INDEX (BMI) OF 30.0 TO 30.9 IN ADULT, UNSPECIFIED OBESITY TYPE, UNSPECIFIED WHETHER SERIOUS COMORBIDITY PRESENT: ICD-10-CM

## 2021-11-30 DIAGNOSIS — E03.9 ACQUIRED HYPOTHYROIDISM: Primary | ICD-10-CM

## 2021-11-30 DIAGNOSIS — E55.9 VITAMIN D DEFICIENCY: ICD-10-CM

## 2021-11-30 DIAGNOSIS — E04.2 MULTINODULAR GOITER: ICD-10-CM

## 2021-11-30 DIAGNOSIS — E78.2 MIXED HYPERLIPIDEMIA: ICD-10-CM

## 2021-11-30 DIAGNOSIS — E06.3 HASHIMOTO'S THYROIDITIS: ICD-10-CM

## 2021-11-30 DIAGNOSIS — R73.01 IFG (IMPAIRED FASTING GLUCOSE): ICD-10-CM

## 2021-11-30 PROCEDURE — 99214 OFFICE O/P EST MOD 30 MIN: CPT | Performed by: INTERNAL MEDICINE

## 2021-11-30 RX ORDER — LEVOTHYROXINE SODIUM 0.03 MG/1
TABLET ORAL
Qty: 90 TABLET | Refills: 1 | Status: SHIPPED | OUTPATIENT
Start: 2021-11-30 | End: 2022-04-01 | Stop reason: SDUPTHER

## 2021-11-30 NOTE — PROGRESS NOTES
SUBJECTIVE:  Liudmila Rubalcava is a 43 y.o. female who is here for a hyperthyroidism, multinodular goiter. 1. Hypothyroidism    This started in 2001. Patient was diagnosed with MNG. The problem has been worsening. Previous thyroid studies include: TSH and free thyroxine. On levothyroxine. Current complaints: improved insomnia, fatigue, hair loss. Fatigue is better. Sleeps well. Hair loss worse. Had a lot of stress, father, brother passed away recently. 2. Multinodular goiter    In 2001 she was diagnosed with 1.5 cm cystic nodule. Repeated US - right 1.4 cm, right superior 1.7 cm, left subcm nodules. Dr. Elmer Lazo follows. History of obstructive symptoms: difficulty swallowing No, changes in voice/hoarseness No.  History of radiation to patient's neck: No  Resent iodine exposure: No  Family history includes thyroid abnormalities. Family history of thyroid cancer: No    3. Obesity  Eats moderately healthy. On Optavia     2. History of Hyperthyroidism    No diarrhea. Hair loss postpartum. Calcium 10.6, ULN 10.5. Had son born 3/28/2018. Has another boy 3yo  Has Mary implant for birth control. Had short periods since 3/2018.    5. Hashimoto's thyroiditis  Has fatigue. 6. Postpartum thyroiditis  Has fatigue. Thyroid sono copy from Dr. Elmer Lazo note: The patient was placed in a semi-recumbent position with mild neck extension. Real time B-mode ultrasound on the neck was performed in transverse/ axial and longitudinal/ sagittal planes. Stable heterogenous MNG with cyto nondiagnostic R inf predominately cystic 1.1cm (prior1.4cm) with R sup solid 1.5cm (prior 1.7cm) and L inf solid hypo 1.1cm without suspicious features. Bilateral infrathyroid LNs benign appearing likely reactive. The patient tolerated the procedure well and without side effects. 7. IFG  Glucose 102    8. Vitamin D deficiency  Has fatigue    9.  Hyperlipidemia  Father had MI at age 46  No HTN, diabetes  No smoking    10. Hair loss  Moderate    Name: Martha Madison   :  1985   [address] [place] [address]. Staff: Ruby Dan [id number]    Verified By: Maral SANZ         Samm: 09  11:51 am  Exams:  US-THYROID/NECK/HEAD      Thyroid ultrasound on 2009    History: Thyroid nodule    Comparison: None    Findings: The right lobe of the gland measures greater than 5 cm in  craniocaudal dimension. It extends beyond the confines of the  transducer. Transversely, it measures 2.4 x 2.5 cm. The left  lobe measures 4.8 x 2.4 x 1.6 cm. The thyroid demonstrates  diffusely heterogeneous echogenicity. There is coarsening of the  echotexture. Within the right lobe, there is a 1.3 x 0.7 x 0.9  cm cyst. There is also a hypoechoic nodule measuring 1.2 x 0.9 x  0.7 cm. The remainder of the gland is diffusely heterogeneous  and may contain additional small hypoechoic nodules. Impression:    Diffusely heterogeneous thyroid gland. There is a discrete cyst  and a discrete nodule seen within the right lobe as described  above.   * end of result *   Status         Past Medical History:   Diagnosis Date    Acquired hypothyroidism 2018    Allergic rhinitis     Goiter, non-toxic     Hypertension     Hyperthyroidism 2018     Patient Active Problem List    Diagnosis Date Noted    COVID-19 2021    IFG (impaired fasting glucose) 2020    Postpartum thyroiditis 2018    Hashimoto's thyroiditis 2018    Acquired hypothyroidism 2018    History of hyperthyroidism 2018    Class 2 obesity with body mass index (BMI) of 38.0 to 38.9 in adult 2018    GDM (gestational diabetes mellitus), class A1 01/10/2018    Psychophysiological insomnia 2017    Allergic rhinitis 2016    Multinodular goiter 2011    Mixed hyperlipidemia 2011    Vitamin D deficiency 2011    Anxiety 2011    Elevated blood pressure reading without diagnosis of hypertension 05/02/2011     Past Surgical History:   Procedure Laterality Date    CYST REMOVAL  07/18/2018    Scalp    EYE SURGERY      WISDOM TOOTH EXTRACTION       Family History   Problem Relation Age of Onset    High Blood Pressure Mother     High Cholesterol Mother     High Blood Pressure Father     High Cholesterol Father     Heart Disease Father     High Cholesterol Brother     Heart Disease Paternal Aunt     Stroke Paternal Uncle     Heart Disease Paternal Uncle     Stroke Maternal Grandmother     Arthritis Paternal Grandmother     Heart Disease Paternal Grandfather      Social History     Socioeconomic History    Marital status:      Spouse name: None    Number of children: None    Years of education: None    Highest education level: None   Occupational History    None   Tobacco Use    Smoking status: Never Smoker    Smokeless tobacco: Never Used   Vaping Use    Vaping Use: Never used   Substance and Sexual Activity    Alcohol use: Not Currently     Comment: 1-2 per month on average    Drug use: No    Sexual activity: Yes   Other Topics Concern    None   Social History Narrative    None     Social Determinants of Health     Financial Resource Strain: Low Risk     Difficulty of Paying Living Expenses: Not hard at all   Food Insecurity: No Food Insecurity    Worried About Running Out of Food in the Last Year: Never true    Anjana of Food in the Last Year: Never true   Transportation Needs: No Transportation Needs    Lack of Transportation (Medical): No    Lack of Transportation (Non-Medical):  No   Physical Activity:     Days of Exercise per Week: Not on file    Minutes of Exercise per Session: Not on file   Stress:     Feeling of Stress : Not on file   Social Connections:     Frequency of Communication with Friends and Family: Not on file    Frequency of Social Gatherings with Friends and Family: Not on file    Attends Yazdanism Services: Not on file    Active Member of Clubs or Organizations: Not on file    Attends Club or Organization Meetings: Not on file    Marital Status: Not on file   Intimate Partner Violence:     Fear of Current or Ex-Partner: Not on file    Emotionally Abused: Not on file    Physically Abused: Not on file    Sexually Abused: Not on file   Housing Stability: Unknown    Unable to Pay for Housing in the Last Year: No    Number of Places Lived in the Last Year: Not on file    Unstable Housing in the Last Year: No     Current Outpatient Medications   Medication Sig Dispense Refill    levothyroxine (SYNTHROID) 25 MCG tablet TAKE 1 TABLET BY MOUTH EVERY DAY 90 tablet 1    fluticasone (FLONASE) 50 MCG/ACT nasal spray SPRAY 1 SPRAY INTO EACH NOSTRIL EVERY DAY 16 g 1    traZODone (DESYREL) 50 MG tablet TAKE 1 TABLET BY MOUTH EVERY DAY AT BEDTIME AS NEEDED FOR SLEEP 90 tablet 1    atorvastatin (LIPITOR) 10 MG tablet TAKE 1 TABLET BY MOUTH EVERY DAY 90 tablet 1    triamcinolone (KENALOG) 0.025 % cream Apply to affected area on left armpit twice daily for up to 1 week then stop for 1 week prn flares 15 g 1    cetirizine (ZYRTEC) 10 MG tablet Take by mouth      Flaxseed, Linseed, 1000 MG CAPS Take  by mouth.  fish oil-omega-3 fatty acids 1000 MG capsule Take 1,600 mg by mouth daily       multivitamin (THERAGRAN) per tablet Take 1 tablet by mouth daily.  Cholecalciferol (VITAMIN D) 2000 units CAPS capsule Take 2,000 Units by mouth daily.  esomeprazole (NEXIUM) 20 MG delayed release capsule Take 20 mg by mouth every morning (before breakfast) (Patient not taking: Reported on 2021)       No current facility-administered medications for this visit.      Allergies   Allergen Reactions    Prednisone      flushing     Family Status   Relation Name Status    Mother  Alive    Father      Brother  Alive    [address]        one  from sudden death   [de-identified]     MGM      PGM  Alive    PGF      MGF  Alive        thyroid, gout    Brother         Review of Systems:  Constitutional: has fatigue, no fever, has recent weight gain, no recent weight loss, no changes in appetite  Eyes: no eye pain, no change in vision, no eye redness, no eye irritation, no double vision  Ears, nose, throat: no nasal congestion, no sore throat, no earache, no decrease in hearing, no hoarseness, no dry mouth, no sinus problems, no difficulty swallowing, has neck lumps, no dental problems, no mouth sores, no ringing in ears  Pulmonary: no shortness of breath, no wheezing, no dyspnea on exertion, no cough  Cardiovascular: no chest pain, no lower extremity edema, no orthopnea, no intermittent leg claudication, no palpitations  Gastrointestinal: no abdominal pain, no nausea, no vomiting, no diarrhea, no constipation, no dysphagia, no heartburn, no bloating  Genitourinary: no dysuria, no urinary incontinence, no urinary hesitancy, no urinary frequency, no feelings of urinary urgency, no nocturia  Musculoskeletal: no joint swelling, no joint stiffness, no joint pain, no muscle cramps, no muscle pain, no bone pain  Integument/Breast: has hair loss, no skin rashes, no skin lesions, no itching, no dry skin  Neurological: no numbness, no tingling, no weakness, no confusion, no headaches, no dizziness, no fainting, no tremors, no decrease in memory, no balance problems  Psychiatric: has anxiety, no depression, has insomnia  Hematologic/Lymphatic: no tendency for easy bleeding, no swollen lymph nodes, no tendency for easy bruising  Immunology: has seasonal allergies, no frequent infections, no frequent illnesses  Endocrine: no temperature intolerance    /76   Pulse 74   Temp 98 °F (36.7 °C)   Resp 14   Ht 5' 6\" (1.676 m)   Wt 191 lb (86.6 kg)   SpO2 98%   BMI 30.83 kg/m²    Wt Readings from Last 3 Encounters:   21 191 lb (86.6 kg)   21 219 lb (99.3 kg)   07/15/21 227 lb (103 kg)     Body mass index is 30.83 kg/m².     OBJECTIVE:  Constitutional: no acute distress, well appearing and well nourished  Psychiatric: oriented to person, place and time, judgement and insight and normal, recent and remote memory and intact and mood and affect are normal  Skin: skin and subcutaneous tissue is normal without mass, normal turgor  Head and Face: examination of head and face revealed no abnormalities  Eyes: no lid or conjunctival swelling, erythema or discharge, pupils are normal, equal, round, reactive to light  Ears/Nose: external inspection of ears and nose revealed no abnormalities, hearing is grossly normal  Oropharynx/Mouth/Face: lips, tongue and gums are normal with no lesions, the voice quality was normal  Neck: neck is supple and symmetric, with midline trachea and no masses, thyroid is enlarged  Lymphatics: normal cervical lymph nodes, normal supraclavicular nodes  Pulmonary: no increased work of breathing or signs of respiratory distress, lungs are clear to auscultation  Cardiovascular: normal heart rate and rhythm, normal S1 and S2, no murmurs and pedal pulses and 2+ bilaterally, No edema  Abdomen: abdomen is soft, non-tender with no masses  Musculoskeletal: normal gait and station and exam of the digits and nails are normal  Neurological: normal coordination and normal general cortical function      Lab Review:    Lab Results   Component Value Date    WBC 6.9 09/03/2020    HGB 13.8 09/03/2020    HCT 40.9 09/03/2020    MCV 87.8 09/03/2020     09/03/2020     Lab Results   Component Value Date     11/24/2021    K 4.5 11/24/2021     11/24/2021    CO2 28 11/24/2021    BUN 14 11/24/2021    CREATININE 0.8 11/24/2021    GLUCOSE 93 11/24/2021    GLUCOSE 83 10/22/2011    CALCIUM 9.4 11/24/2021    PROT 7.0 11/24/2021    PROT 7.1 10/22/2011    LABALBU 4.5 11/24/2021    BILITOT 0.5 11/24/2021    ALKPHOS 58 11/24/2021    AST 16 11/24/2021    ALT 17 11/24/2021    LABGLOM >60 11/24/2021    GFRAA >60 11/24/2021    GFRAA 98 10/22/2011    AGRATIO 1.8 11/24/2021    GLOB 2.6 05/12/2021     Lab Results   Component Value Date    TSH 0.91 11/24/2021    FT3 2.7 11/24/2021     Lab Results   Component Value Date    LABA1C 5.0 11/24/2021     Lab Results   Component Value Date    EAG 96.8 11/24/2021     Lab Results   Component Value Date    CHOL 112 11/24/2021     Lab Results   Component Value Date    TRIG 46 11/24/2021     Lab Results   Component Value Date    HDL 43 11/24/2021    HDL 51 03/14/2012     Lab Results   Component Value Date    LDLCALC 60 11/24/2021     Lab Results   Component Value Date    LABVLDL 9 11/24/2021     No results found for: CHOLHDLRATIO  No results found for: Brett Tian [id number]  Lab Results   Component Value Date    VITD25 71.6 11/24/2021        ASSESSMENT/PLAN:    1. Hypothyroidism  TSH 4.63-3.42-1.540.91  TSH 1.6 on 0.025 mg, TSH-0.69 on 0.05 mg  Continue levothyroxine 0.025 mg. Follow TSH    2.  Obesity   Diet, exercise. Tried HCG diet, weight watchers, low carb. On Optavia diet, lost 50 lbs in 5 months    3. Multinodular goiter  Follow sonogram with Dr. Deacon Corrales. Stable heterogenous MNG with cyto nondiagnostic R inf predominately cystic 1.1cm (prior1.4cm) with R sup solid 1.5cm (prior 1.7cm) and L inf solid hypo 1.1cm without suspicious features. Bilateral infrathyroid LNs benign appearing likely reactive. 4. Hashimoto's thyroiditis  Follow TSH. 5. Postpartum thyroiditis  Follow TSH. 6. History of Hyperthyroidism  - T3, Free; Future  - T4, Free; Future  - TSH without Reflex; Future    7. IFG   Hemoglobin A1c 5.0  Glucose 102-88-89  Hx of gestational diabetes. Prediabetes education    8. Vitamin D deficiency  25OHVitamin D 49.8-59.871.6  Continue vit D 2000 IU daily    9. Hyperlipidemia  - lipid panel  - Diet, exercise  -9260  Lipitor 10 mg daily    10.  Hair loss  Obtain ferritin  DHEAS, testosterone    Reviewed and/or ordered clinical lab results Yes  Reviewed and/or ordered radiology tests Yes   Reviewed and/or ordered other diagnostic tests No  Discussed test results with performing physician No  Independently reviewed image, tracing, or specimen No  Made a decision to obtain old records No  Reviewed and summarized old records Yes  In 2001 she was diagnosed with 1.5 cm cystic nodule. Repeated US - right 1.4 cm, right superior 1.7 cm, left subcm nodules. TSH 7/21/2018 <0.01  TSH 7/2016 2.36  TSH 8/2018 6.12  TSH 10/14/2018 4.63  11/7/2018 TSH 3.42  Obtained history from other than patient No    Aimee Delong was counseled regarding symptoms of thyroid diagnosis, course and complications of disease if inadequately treated, side effects of medications, diagnosis, treatment options, and prognosis, risks, benefits, complications, and alternatives of treatment, labs, imaging and other studies and treatment targets and goals, thyorid nodules, hyperthyroidism causes treatment. .  She understands instructions and counseling. Return in about 4 months (around 3/30/2022) for thyroid problems.

## 2021-12-01 LAB — FERRITIN: 92.1 NG/ML (ref 15–150)

## 2021-12-02 LAB
SEX HORMONE BINDING GLOBULIN: 30 NMOL/L (ref 30–135)
TESTOSTERONE FREE-NONMALE: <1 PG/ML (ref 1.3–9.2)
TESTOSTERONE TOTAL: 3 NG/DL (ref 20–70)

## 2021-12-03 LAB — DHEAS (DHEA SULFATE): 97.5 UG/DL (ref 45–270)

## 2021-12-05 DIAGNOSIS — E78.00 ELEVATED LDL CHOLESTEROL LEVEL: Primary | ICD-10-CM

## 2021-12-17 DIAGNOSIS — E78.00 ELEVATED LDL CHOLESTEROL LEVEL: ICD-10-CM

## 2021-12-17 NOTE — TELEPHONE ENCOUNTER
LOV 8/5/2021    Future Appointments   Date Time Provider Andrea Daigle   4/1/2022  9:10 AM Ceferino Vargas MD [facility]

## 2021-12-18 RX ORDER — ATORVASTATIN CALCIUM 10 MG/1
TABLET, FILM COATED ORAL
Qty: 90 TABLET | Refills: 1 | Status: SHIPPED | OUTPATIENT
Start: 2021-12-18 | End: 2022-03-30 | Stop reason: ALTCHOICE

## 2022-03-25 DIAGNOSIS — E78.2 MIXED HYPERLIPIDEMIA: ICD-10-CM

## 2022-03-25 DIAGNOSIS — E78.00 ELEVATED LDL CHOLESTEROL LEVEL: ICD-10-CM

## 2022-03-25 DIAGNOSIS — E03.9 ACQUIRED HYPOTHYROIDISM: ICD-10-CM

## 2022-03-25 DIAGNOSIS — R73.01 IFG (IMPAIRED FASTING GLUCOSE): ICD-10-CM

## 2022-03-25 DIAGNOSIS — Z86.39 HISTORY OF HYPERTHYROIDISM: ICD-10-CM

## 2022-03-25 DIAGNOSIS — E06.3 HASHIMOTO'S THYROIDITIS: ICD-10-CM

## 2022-03-25 DIAGNOSIS — L65.9 HAIR LOSS: ICD-10-CM

## 2022-03-25 DIAGNOSIS — E55.9 VITAMIN D DEFICIENCY: ICD-10-CM

## 2022-03-25 DIAGNOSIS — E04.2 MULTINODULAR GOITER: ICD-10-CM

## 2022-03-25 LAB
A/G RATIO: 1.7 (ref 1.1–2.2)
ALBUMIN SERPL-MCNC: 4.5 G/DL (ref 3.4–5)
ALP BLD-CCNC: 47 U/L (ref 40–129)
ALT SERPL-CCNC: 14 U/L (ref 10–40)
ANION GAP SERPL CALCULATED.3IONS-SCNC: 12 MMOL/L (ref 3–16)
AST SERPL-CCNC: 16 U/L (ref 15–37)
BILIRUB SERPL-MCNC: 0.8 MG/DL (ref 0–1)
BUN BLDV-MCNC: 14 MG/DL (ref 7–20)
CALCIUM SERPL-MCNC: 9.6 MG/DL (ref 8.3–10.6)
CHLORIDE BLD-SCNC: 98 MMOL/L (ref 99–110)
CHOLESTEROL, TOTAL: 174 MG/DL (ref 0–199)
CO2: 25 MMOL/L (ref 21–32)
CREAT SERPL-MCNC: 0.7 MG/DL (ref 0.6–1.1)
GFR AFRICAN AMERICAN: >60
GFR NON-AFRICAN AMERICAN: >60
GLUCOSE BLD-MCNC: 91 MG/DL (ref 70–99)
HDLC SERPL-MCNC: 59 MG/DL (ref 40–60)
LDL CHOLESTEROL CALCULATED: 108 MG/DL
POTASSIUM SERPL-SCNC: 4.3 MMOL/L (ref 3.5–5.1)
SODIUM BLD-SCNC: 135 MMOL/L (ref 136–145)
T3 FREE: 3.2 PG/ML (ref 2.3–4.2)
T4 FREE: 1.3 NG/DL (ref 0.9–1.8)
TOTAL PROTEIN: 7.2 G/DL (ref 6.4–8.2)
TRIGL SERPL-MCNC: 36 MG/DL (ref 0–150)
TSH SERPL DL<=0.05 MIU/L-ACNC: 0.9 UIU/ML (ref 0.27–4.2)
VITAMIN D 25-HYDROXY: 73.6 NG/ML
VLDLC SERPL CALC-MCNC: 7 MG/DL

## 2022-03-30 ENCOUNTER — NURSE ONLY (OUTPATIENT)
Dept: FAMILY MEDICINE CLINIC | Age: 37
End: 2022-03-30
Payer: COMMERCIAL

## 2022-03-30 DIAGNOSIS — R52 PAIN OF RIGHT SIDE OF BODY: Primary | ICD-10-CM

## 2022-03-30 PROCEDURE — 99213 OFFICE O/P EST LOW 20 MIN: CPT | Performed by: FAMILY MEDICINE

## 2022-03-30 RX ORDER — TIZANIDINE 4 MG/1
4 TABLET ORAL 3 TIMES DAILY
Qty: 30 TABLET | Refills: 0 | Status: SHIPPED | OUTPATIENT
Start: 2022-03-30 | End: 2022-04-09

## 2022-03-30 RX ORDER — MELOXICAM 15 MG/1
15 TABLET ORAL DAILY PRN
Qty: 30 TABLET | Refills: 0 | Status: SHIPPED | OUTPATIENT
Start: 2022-03-30 | End: 2022-06-02 | Stop reason: ALTCHOICE

## 2022-03-30 RX ORDER — FLUTICASONE PROPIONATE 50 MCG
SPRAY, SUSPENSION (ML) NASAL
Qty: 16 G | Refills: 1 | Status: SHIPPED | OUTPATIENT
Start: 2022-03-30

## 2022-03-30 RX ORDER — TRAZODONE HYDROCHLORIDE 50 MG/1
TABLET ORAL
Qty: 90 TABLET | Refills: 1 | Status: SHIPPED | OUTPATIENT
Start: 2022-03-30 | End: 2022-11-02 | Stop reason: SDUPTHER

## 2022-03-30 ASSESSMENT — PATIENT HEALTH QUESTIONNAIRE - PHQ9
1. LITTLE INTEREST OR PLEASURE IN DOING THINGS: 0
SUM OF ALL RESPONSES TO PHQ9 QUESTIONS 1 & 2: 0
SUM OF ALL RESPONSES TO PHQ QUESTIONS 1-9: 0
2. FEELING DOWN, DEPRESSED OR HOPELESS: 0

## 2022-03-30 NOTE — PROGRESS NOTES
3/30/2022    TELEHEALTH EVALUATION -- Audio/Visual (During VPWXP-13 public health emergency)    HPI:    Mayco Jameson (:  1985) has requested an audio/video evaluation for the following concern(s):    Chief Complaint   Patient presents with    Hip Pain     heard pop in R hip when jogging this morning      Right hip on ileac creast  Has been running: walking/jogging once a week, has been doing 5K's since 2021  Previous episodes: No, nothing different today, running shoes since 2021, onset sudden, radiating to the flank of her back but staying mostly int he lumbar, pain worse when putting pressure on it and when changing position, has iced and used biofreeze, nothing analgesic. Pain sharp and 3/10 if stationary but grimacing if she puts pressure on it 7/10    Labs ordered by endocrinology from 2022 within normal limits except for sodium 135, . Review of Systems    Prior to Visit Medications    Medication Sig Taking? Authorizing Provider   meloxicam (MOBIC) 15 MG tablet Take 1 tablet by mouth daily as needed for Pain Yes Meli Pac, DO   tiZANidine (ZANAFLEX) 4 MG tablet Take 1 tablet by mouth 3 times daily for 10 days Yes Meli Pac, DO   traZODone (DESYREL) 50 MG tablet TAKE 1 TABLET BY MOUTH EVERY DAY AT BEDTIME AS NEEDED FOR SLEEP Yes Ila Good, DO   fluticasone (FLONASE) 50 MCG/ACT nasal spray SPRAY 1 SPRAY INTO EACH NOSTRIL EVERY DAY Yes Meli Pac, DO   levothyroxine (SYNTHROID) 25 MCG tablet TAKE 1 TABLET BY MOUTH EVERY DAY Yes Priya Walls MD   cetirizine (ZYRTEC) 10 MG tablet Take by mouth Yes Historical Provider, MD   Flaxseed, Linseed, 1000 MG CAPS Take  by mouth. Yes Historical Provider, MD   fish oil-omega-3 fatty acids 1000 MG capsule Take 1,600 mg by mouth daily  Yes Historical Provider, MD   multivitamin [facility]) per tablet Take 1 tablet by mouth daily.    Yes Historical Provider, MD   Cholecalciferol (VITAMIN D)  units CAPS capsule Take 2,000 Units by mouth daily. Yes Historical Provider, MD       Social History     Tobacco Use    Smoking status: Never Smoker    Smokeless tobacco: Never Used   Vaping Use    Vaping Use: Never used   Substance Use Topics    Alcohol use: Not Currently     Comment: 1-2 per month on average    Drug use: No        Allergies   Allergen Reactions    Prednisone      flushing   ,   Past Medical History:   Diagnosis Date    Acquired hypothyroidism 8/26/2018    Allergic rhinitis     Goiter, non-toxic     Hypertension     Hyperthyroidism 8/20/2018   ,   Past Surgical History:   Procedure Laterality Date    CYST REMOVAL  07/18/2018    Scalp    EYE SURGERY      WISDOM TOOTH EXTRACTION         PHYSICAL EXAMINATION:  [ INSTRUCTIONS:  \"[x]\" Indicates a positive item  \"[]\" Indicates a negative item  -- DELETE ALL ITEMS NOT EXAMINED]  Vital Signs: (As obtained by patient/caregiver or practitioner observation)    Height  -    5' 6\"         Weight -   175 lb               Constitutional: [x] Appears well-developed and well-nourished [x] No apparent distress      [] Abnormal-   Mental status  [x] Alert and awake  [x] Oriented to person/place/time [x]Able to follow commands      Eyes:  EOM    [x]  Normal  [] Abnormal-  Sclera  []  Normal  [] Abnormal -         Discharge []  None visible  [] Abnormal -    HENT:   [x] Normocephalic, atraumatic.   [] Abnormal   [] Mouth/Throat: Mucous membranes are moist.     External Ears [x] Normal  [] Abnormal-     Neck: [x] No visualized mass     Pulmonary/Chest: [x] Respiratory effort normal.  [x] No visualized signs of difficulty breathing or respiratory distress        [] Abnormal-      Musculoskeletal:   [] Normal gait with no signs of ataxia         [x] Normal range of motion of neck        [] Abnormal-       Neurological:        [x] No Facial Asymmetry (Cranial nerve 7 motor function) (limited exam to video visit)          [x] No gaze palsy        [] Abnormal- Skin:        [x] No significant exanthematous lesions or discoloration noted on facial skin         [] Abnormal-            Psychiatric:       [x] Normal Affect [] No Hallucinations        [] Abnormal-     Other pertinent observable physical exam findings-     ASSESSMENT/PLAN:  1. Pain of right side of body  - ice 15 minute intervals  - rest  - meloxicam (MOBIC) 15 MG tablet; Take 1 tablet by mouth daily as needed for Pain  Dispense: 30 tablet; Refill: 0  - tiZANidine (ZANAFLEX) 4 MG tablet; Take 1 tablet by mouth 3 times daily for 10 days  Dispense: 30 tablet; Refill: 0    Pt will send status update in 2 days. David Josue, was evaluated through a synchronous (real-time) audio-video encounter. The patient (or guardian if applicable) is aware that this is a billable service. Verbal consent to proceed has been obtained within the past 12 months. The visit was conducted pursuant to the emergency declaration under the [address] [place] [address] waiver authority and the [employer] and [employer] Act. Patient identification was verified, and a caregiver was present when appropriate. The patient was located in a state where the provider was credentialed to provide care. Total time spent on this encounter: Not billed by time    --Mia Murcia DO on 3/30/2022 at 11:37 AM    An electronic signature was used to authenticate this note.

## 2022-04-01 ENCOUNTER — OFFICE VISIT (OUTPATIENT)
Dept: ENDOCRINOLOGY | Age: 37
End: 2022-04-01
Payer: COMMERCIAL

## 2022-04-01 VITALS
TEMPERATURE: 98 F | SYSTOLIC BLOOD PRESSURE: 118 MMHG | DIASTOLIC BLOOD PRESSURE: 64 MMHG | WEIGHT: 178 LBS | HEIGHT: 66 IN | OXYGEN SATURATION: 99 % | BODY MASS INDEX: 28.61 KG/M2 | HEART RATE: 65 BPM | RESPIRATION RATE: 14 BRPM

## 2022-04-01 DIAGNOSIS — E66.9 CLASS 1 OBESITY WITH BODY MASS INDEX (BMI) OF 30.0 TO 30.9 IN ADULT, UNSPECIFIED OBESITY TYPE, UNSPECIFIED WHETHER SERIOUS COMORBIDITY PRESENT: ICD-10-CM

## 2022-04-01 DIAGNOSIS — E55.9 VITAMIN D DEFICIENCY: ICD-10-CM

## 2022-04-01 DIAGNOSIS — Z86.39 HISTORY OF HYPERTHYROIDISM: ICD-10-CM

## 2022-04-01 DIAGNOSIS — E03.9 ACQUIRED HYPOTHYROIDISM: Primary | ICD-10-CM

## 2022-04-01 DIAGNOSIS — E04.2 MULTINODULAR GOITER: ICD-10-CM

## 2022-04-01 DIAGNOSIS — E78.2 MIXED HYPERLIPIDEMIA: ICD-10-CM

## 2022-04-01 DIAGNOSIS — E06.3 HASHIMOTO'S THYROIDITIS: ICD-10-CM

## 2022-04-01 DIAGNOSIS — L65.9 HAIR LOSS: ICD-10-CM

## 2022-04-01 DIAGNOSIS — R73.01 IFG (IMPAIRED FASTING GLUCOSE): ICD-10-CM

## 2022-04-01 PROBLEM — E66.811 CLASS 1 OBESITY WITH BODY MASS INDEX (BMI) OF 30.0 TO 30.9 IN ADULT: Status: ACTIVE | Noted: 2022-04-01

## 2022-04-01 PROCEDURE — 99214 OFFICE O/P EST MOD 30 MIN: CPT | Performed by: INTERNAL MEDICINE

## 2022-04-01 RX ORDER — LEVOTHYROXINE SODIUM 0.03 MG/1
TABLET ORAL
Qty: 90 TABLET | Refills: 1 | Status: SHIPPED | OUTPATIENT
Start: 2022-04-01 | End: 2022-07-22 | Stop reason: SDUPTHER

## 2022-04-01 NOTE — PROGRESS NOTES
SUBJECTIVE:  Garett Olivares is a 43 y.o. female who is here for a hyperthyroidism, multinodular goiter. 1. Hypothyroidism    This started in 2001. Patient was diagnosed with MNG. The problem has been worsening. Previous thyroid studies include: TSH and free thyroxine. On levothyroxine. Current complaints: improved insomnia, fatigue, hair loss. Fatigue is better. Sleeps well. Hair loss worse. Had a lot of stress, father, brother passed away recently. 2. Multinodular goiter    In 2001 she was diagnosed with 1.5 cm cystic nodule. Repeated US - right 1.4 cm, right superior 1.7 cm, left subcm nodules. Dr. Yessenia Zarate follows. History of obstructive symptoms: difficulty swallowing No, changes in voice/hoarseness No.  History of radiation to patient's neck: No  Resent iodine exposure: No  Family history includes thyroid abnormalities. Family history of thyroid cancer: No    3. Obesity  Eats moderately healthy. On Optavia   Lost 65 lbs on Optavia    2. History of Hyperthyroidism    No diarrhea. Hair loss postpartum. Calcium 10.6, ULN 10.5. Had son born 3/28/2018. Has another boy 3yo  Has Mary implant for birth control. Had short periods since 3/2018.    5. Hashimoto's thyroiditis  Has fatigue. 6. Postpartum thyroiditis  Has fatigue. Thyroid sono copy from Dr. Yessenia Zarate note: The patient was placed in a semi-recumbent position with mild neck extension. Real time B-mode ultrasound on the neck was performed in transverse/ axial and longitudinal/ sagittal planes. Stable heterogenous MNG with cyto nondiagnostic R inf predominately cystic 1.1cm (prior1.4cm) with R sup solid 1.5cm (prior 1.7cm) and L inf solid hypo 1.1cm without suspicious features. Bilateral infrathyroid LNs benign appearing likely reactive. The patient tolerated the procedure well and without side effects. 7. IFG  Glucose 102    8. Vitamin D deficiency  Has fatigue    9.  Hyperlipidemia  Father had MI at age 46  No HTN, diabetes  No smoking    10. Hair loss  Moderate    Name: Wilner Cuba   :  1985   [address] [place] [address]. Staff: Arely Alvarenga [id number]    Verified By: Daniel SANZ         Samm: 09  11:51 am  Exams:  US-THYROID/NECK/HEAD      Thyroid ultrasound on 2009    History: Thyroid nodule    Comparison: None    Findings: The right lobe of the gland measures greater than 5 cm in  craniocaudal dimension. It extends beyond the confines of the  transducer. Transversely, it measures 2.4 x 2.5 cm. The left  lobe measures 4.8 x 2.4 x 1.6 cm. The thyroid demonstrates  diffusely heterogeneous echogenicity. There is coarsening of the  echotexture. Within the right lobe, there is a 1.3 x 0.7 x 0.9  cm cyst. There is also a hypoechoic nodule measuring 1.2 x 0.9 x  0.7 cm. The remainder of the gland is diffusely heterogeneous  and may contain additional small hypoechoic nodules. Impression:    Diffusely heterogeneous thyroid gland. There is a discrete cyst  and a discrete nodule seen within the right lobe as described  above.   * end of result *   Status         Past Medical History:   Diagnosis Date    Acquired hypothyroidism 2018    Allergic rhinitis     Goiter, non-toxic     Hypertension     Hyperthyroidism 2018     Patient Active Problem List    Diagnosis Date Noted    Hair loss 2022    Class 1 obesity with body mass index (BMI) of 30.0 to 30.9 in adult 2022    COVID-19 2021    IFG (impaired fasting glucose) 2020    Postpartum thyroiditis 2018    Hashimoto's thyroiditis 2018    Acquired hypothyroidism 2018    History of hyperthyroidism 2018    Class 2 obesity with body mass index (BMI) of 38.0 to 38.9 in adult 2018    GDM (gestational diabetes mellitus), class A1 01/10/2018    Psychophysiological insomnia 2017    Allergic rhinitis 2016    Multinodular goiter 05/02/2011    Mixed hyperlipidemia 05/02/2011    Vitamin D deficiency 05/02/2011    Anxiety 05/02/2011    Elevated blood pressure reading without diagnosis of hypertension 05/02/2011     Past Surgical History:   Procedure Laterality Date    CYST REMOVAL  07/18/2018    Scalp    EYE SURGERY      WISDOM TOOTH EXTRACTION       Family History   Problem Relation Age of Onset    High Blood Pressure Mother     High Cholesterol Mother     High Blood Pressure Father     High Cholesterol Father     Heart Disease Father     High Cholesterol Brother     Heart Disease Paternal Aunt     Stroke Paternal Uncle     Heart Disease Paternal Uncle     Stroke Maternal Grandmother     Arthritis Paternal Grandmother     Heart Disease Paternal Grandfather      Social History     Socioeconomic History    Marital status:      Spouse name: None    Number of children: None    Years of education: None    Highest education level: None   Occupational History    None   Tobacco Use    Smoking status: Never Smoker    Smokeless tobacco: Never Used   Vaping Use    Vaping Use: Never used   Substance and Sexual Activity    Alcohol use: Not Currently     Comment: 1-2 per month on average    Drug use: No    Sexual activity: Yes   Other Topics Concern    None   Social History Narrative    None     Social Determinants of Health     Financial Resource Strain: Low Risk     Difficulty of Paying Living Expenses: Not hard at all   Food Insecurity: No Food Insecurity    Worried About Running Out of Food in the Last Year: Never true    Anjana of Food in the Last Year: Never true   Transportation Needs: No Transportation Needs    Lack of Transportation (Medical): No    Lack of Transportation (Non-Medical):  No   Physical Activity:     Days of Exercise per Week: Not on file    Minutes of Exercise per Session: Not on file   Stress:     Feeling of Stress : Not on file   Social Connections:     Frequency of Communication with Friends and Family: Not on file    Frequency of Social Gatherings with Friends and Family: Not on file    Attends Voodoo Services: Not on file    Active Member of Clubs or Organizations: Not on file    Attends Club or Organization Meetings: Not on file    Marital Status: Not on file   Intimate Partner Violence:     Fear of Current or Ex-Partner: Not on file    Emotionally Abused: Not on file    Physically Abused: Not on file    Sexually Abused: Not on file   Housing Stability: Unknown    Unable to Pay for Housing in the Last Year: No    Number of Places Lived in the Last Year: Not on file    Unstable Housing in the Last Year: No     Current Outpatient Medications   Medication Sig Dispense Refill    meloxicam (MOBIC) 15 MG tablet Take 1 tablet by mouth daily as needed for Pain 30 tablet 0    tiZANidine (ZANAFLEX) 4 MG tablet Take 1 tablet by mouth 3 times daily for 10 days 30 tablet 0    traZODone (DESYREL) 50 MG tablet TAKE 1 TABLET BY MOUTH EVERY DAY AT BEDTIME AS NEEDED FOR SLEEP 90 tablet 1    fluticasone (FLONASE) 50 MCG/ACT nasal spray SPRAY 1 SPRAY INTO EACH NOSTRIL EVERY DAY 16 g 1    levothyroxine (SYNTHROID) 25 MCG tablet TAKE 1 TABLET BY MOUTH EVERY DAY 90 tablet 1    cetirizine (ZYRTEC) 10 MG tablet Take by mouth      Flaxseed, Linseed, 1000 MG CAPS Take  by mouth.  fish oil-omega-3 fatty acids 1000 MG capsule Take 1,600 mg by mouth daily       multivitamin (THERAGRAN) per tablet Take 1 tablet by mouth daily.  Cholecalciferol (VITAMIN D) 2000 units CAPS capsule Take 2,000 Units by mouth daily. No current facility-administered medications for this visit.      Allergies   Allergen Reactions    Prednisone      flushing     Family Status   Relation Name Status    Mother  Alive    Father      Brother  Alive   [address] [place] [address]        one  from sudden death   Floydene Ada [address]      MGM      [facility]  Alive    PGF      [employer]  Alive thyroid, gout    Brother         Review of Systems:  Constitutional: has fatigue, no fever, has recent weight gain, no recent weight loss, no changes in appetite  Eyes: no eye pain, no change in vision, no eye redness, no eye irritation, no double vision  Ears, nose, throat: no nasal congestion, no sore throat, no earache, no decrease in hearing, no hoarseness, no dry mouth, no sinus problems, no difficulty swallowing, has neck lumps, no dental problems, no mouth sores, no ringing in ears  Pulmonary: no shortness of breath, no wheezing, no dyspnea on exertion, no cough  Cardiovascular: no chest pain, no lower extremity edema, no orthopnea, no intermittent leg claudication, no palpitations  Gastrointestinal: no abdominal pain, no nausea, no vomiting, no diarrhea, no constipation, no dysphagia, no heartburn, no bloating  Genitourinary: no dysuria, no urinary incontinence, no urinary hesitancy, no urinary frequency, no feelings of urinary urgency, no nocturia  Musculoskeletal: no joint swelling, no joint stiffness, no joint pain, no muscle cramps, no muscle pain, no bone pain  Integument/Breast: has hair loss, no skin rashes, no skin lesions, no itching, no dry skin  Neurological: no numbness, no tingling, no weakness, no confusion, no headaches, no dizziness, no fainting, no tremors, no decrease in memory, no balance problems  Psychiatric: has anxiety, no depression, has insomnia  Hematologic/Lymphatic: no tendency for easy bleeding, no swollen lymph nodes, no tendency for easy bruising  Immunology: has seasonal allergies, no frequent infections, no frequent illnesses  Endocrine: no temperature intolerance    /64   Pulse 65   Temp 98 °F (36.7 °C)   Resp 14   Ht 5' 6\" (1.676 m)   Wt 178 lb (80.7 kg)   SpO2 99%   BMI 28.73 kg/m²    Wt Readings from Last 3 Encounters:   22 178 lb (80.7 kg)   21 191 lb (86.6 kg)   21 219 lb (99.3 kg)     Body mass index is 28.73 kg/m².     OBJECTIVE:  Constitutional: no acute distress, well appearing and well nourished  Psychiatric: oriented to person, place and time, judgement and insight and normal, recent and remote memory and intact and mood and affect are normal  Skin: skin and subcutaneous tissue is normal without mass, normal turgor  Head and Face: examination of head and face revealed no abnormalities  Eyes: no lid or conjunctival swelling, erythema or discharge, pupils are normal, equal, round, reactive to light  Ears/Nose: external inspection of ears and nose revealed no abnormalities, hearing is grossly normal  Oropharynx/Mouth/Face: lips, tongue and gums are normal with no lesions, the voice quality was normal  Neck: neck is supple and symmetric, with midline trachea and no masses, thyroid is enlarged  Lymphatics: normal cervical lymph nodes, normal supraclavicular nodes  Pulmonary: no increased work of breathing or signs of respiratory distress, lungs are clear to auscultation  Cardiovascular: normal heart rate and rhythm, normal S1 and S2, no murmurs and pedal pulses and 2+ bilaterally, No edema  Abdomen: abdomen is soft, non-tender with no masses  Musculoskeletal: normal gait and station and exam of the digits and nails are normal  Neurological: normal coordination and normal general cortical function      Lab Review:    Lab Results   Component Value Date    WBC 6.9 09/03/2020    HGB 13.8 09/03/2020    HCT 40.9 09/03/2020    MCV 87.8 09/03/2020     09/03/2020     Lab Results   Component Value Date     03/25/2022    K 4.3 03/25/2022    CL 98 03/25/2022    CO2 25 03/25/2022    BUN 14 03/25/2022    CREATININE 0.7 03/25/2022    GLUCOSE 91 03/25/2022    GLUCOSE 83 10/22/2011    CALCIUM 9.6 03/25/2022    PROT 7.2 03/25/2022    PROT 7.1 10/22/2011    LABALBU 4.5 03/25/2022    BILITOT 0.8 03/25/2022    ALKPHOS 47 03/25/2022    AST 16 03/25/2022    ALT 14 03/25/2022    LABGLOM >60 03/25/2022    GFRAA >60 03/25/2022 GFRAA 98 10/22/2011    AGRATIO 1.7 03/25/2022    GLOB 2.6 05/12/2021     Lab Results   Component Value Date    TSH 0.90 03/25/2022    FT3 3.2 03/25/2022     Lab Results   Component Value Date    LABA1C 5.0 11/24/2021     Lab Results   Component Value Date    EAG 96.8 11/24/2021     Lab Results   Component Value Date    CHOL 174 03/25/2022     Lab Results   Component Value Date    TRIG 36 03/25/2022     Lab Results   Component Value Date    HDL 59 03/25/2022    HDL 51 03/14/2012     Lab Results   Component Value Date    LDLCALC 108 03/25/2022     Lab Results   Component Value Date    LABVLDL 7 03/25/2022     No results found for: CHOLHDLRATIO  No results found for: Derek Booneas  Lab Results   Component Value Date    VITD25 73.6 03/25/2022        ASSESSMENT/PLAN:    1. Hypothyroidism  TSH 4.63-3.42-1.54-0.91-0.90  TSH 1.6 on 0.025 mg, TSH-0.69 on 0.05 mg  Continue levothyroxine 0.025 mg. Follow TSH    2.  Obesity   Diet, exercise. Tried HCG diet, weight watchers, low carb. On Optavia diet, lost 50 lbs in 5 months    3. Multinodular goiter  Follow sonogram with Dr. Lorna Lam. Stable heterogenous MNG with cyto nondiagnostic R inf predominately cystic 1.1cm (prior1.4cm) with R sup solid 1.5cm (prior 1.7cm) and L inf solid hypo 1.1cm without suspicious features. Bilateral infrathyroid LNs benign appearing likely reactive. 4. Hashimoto's thyroiditis  Follow TSH. 5. Postpartum thyroiditis  Follow TSH. 6. History of Hyperthyroidism  - T3, Free; Future  - T4, Free; Future  - TSH without Reflex; Future    7. IFG   Hemoglobin A1c 5.0  Glucose 102-88-89  Hx of gestational diabetes. Prediabetes education    8. Vitamin D deficiency  25OHVitamin D 49.8-59.8-71.6-73.6  Decrease vit D [id number] IU daily fpr summer    9. Hyperlipidemia  - lipid panel  - Diet, exercise  -84-  Lipitor 10 mg daily    10.  Hair loss  Ferritin 92.1  DHEAS, testosterone    Reviewed and/or ordered clinical lab results Yes  Reviewed and/or ordered radiology tests Yes   Reviewed and/or ordered other diagnostic tests No  Discussed test results with performing physician No  Independently reviewed image, tracing, or specimen No  Made a decision to obtain old records No  Reviewed and summarized old records Yes  In 2001 she was diagnosed with 1.5 cm cystic nodule. Repeated US - right 1.4 cm, right superior 1.7 cm, left subcm nodules. TSH 7/21/2018 <0.01  TSH 7/2016 2.36  TSH 8/2018 6.12  TSH 10/14/2018 4.63  11/7/2018 TSH 3.42  Obtained history from other than patient No    Jeny Tucker was counseled regarding symptoms of thyroid diagnosis, course and complications of disease if inadequately treated, side effects of medications, diagnosis, treatment options, and prognosis, risks, benefits, complications, and alternatives of treatment, labs, imaging and other studies and treatment targets and goals, thyorid nodules, hyperthyroidism causes treatment. .  She understands instructions and counseling. Return in about 3 months (around 7/1/2022) for thyroid problems.

## 2022-06-01 NOTE — PROGRESS NOTES
Trevor Bear  YOB: 1985    Date of Service:  6/2/2022    Chief Complaint:   Trevor Bear is a 42 y.o. female who presents for complete physical examination. HPI:  HPI    Fasting labs ordered on 04/01/22 have not yet been done.      Hx abnormal PAP: no  Sexual activity: has sex with males   Self-breast exams: yes  Previous DEXA scan: no  Last eye exam: 01/2021, normal  Exercise: walks 3 time(s) per week and aerobics 7 time(s) per week  Seatbelt use: yes  Are You a Spiritual Person: no  Advance Directive: no    Wt Readings from Last 3 Encounters:   06/02/22 171 lb 12.8 oz (77.9 kg)   04/01/22 178 lb (80.7 kg)   11/30/21 191 lb (86.6 kg)     BP Readings from Last 3 Encounters:   06/02/22 128/78   04/01/22 118/64   11/30/21 128/76       Patient Active Problem List   Diagnosis    Multinodular goiter    Mixed hyperlipidemia    Vitamin D deficiency    Anxiety    Elevated blood pressure reading without diagnosis of hypertension    Allergic rhinitis    Psychophysiological insomnia    GDM (gestational diabetes mellitus), class A1    History of hyperthyroidism    Class 2 obesity with body mass index (BMI) of 38.0 to 38.9 in adult    Postpartum thyroiditis    Hashimoto's thyroiditis    Acquired hypothyroidism    IFG (impaired fasting glucose)    COVID-19    Hair loss    Class 1 obesity with body mass index (BMI) of 30.0 to 30.9 in adult       Health Maintenance   Topic Date Due    Varicella vaccine (1 of 2 - 2-dose childhood series) Never done    HIV screen  Never done    Hepatitis C screen  Never done    Cervical cancer screen  Never done    A1C test (Diabetic or Prediabetic)  11/24/2022    Depression Screen  03/30/2023    DTaP/Tdap/Td vaccine (5 - Td or Tdap) 03/01/2028    Flu vaccine  Completed    COVID-19 Vaccine  Completed    Hepatitis A vaccine  Aged Out    Hepatitis B vaccine  Aged Out    Hib vaccine  Aged Out    Meningococcal (ACWY) vaccine  Aged Out    Pneumococcal 0-64 years Vaccine  Aged [employer] History   Administered Date(s) Administered    COVID-19, Moderna, Primary or Immunocompromised, PF, 100mcg/0.5mL 03/23/2021, 04/20/2021, 12/13/2021    HPV Quadrivalent (Gardasil) 09/02/2010    Influenza Vaccine, unspecified formulation 11/11/2016    Influenza Virus Vaccine 09/12/2018    Influenza, MDCK Quadv, with preserv IM (Flucelvax 2 yrs and older) 12/31/2019, 12/31/2019, 12/02/2020, 12/02/2020    Influenza, [place], IM, (6 mo and older Fluzone, Flulaval, Fluarix and 3 yrs and older Afluria) 12/02/2020    Influenza, Amber [place], IM, PF (6 mo and older Fluzone, Flulaval, Fluarix, and 3 yrs and older Afluria) 12/17/2021    MMR 08/03/2016    PPD Test 06/29/2011    Tdap (Boostrix, Adacel) 05/02/2006, 07/01/2011, 05/01/2015, 03/01/2018       Allergies   Allergen Reactions    Prednisone      flushing     Outpatient Medications Marked as Taking for the 6/2/22 encounter (Office Visit) with Woody Gramajo,    Medication Sig Dispense Refill    amoxicillin (AMOXIL) 500 mg capsule       levothyroxine (SYNTHROID) 25 MCG tablet TAKE 1 TABLET BY MOUTH EVERY DAY 90 tablet 1    traZODone (DESYREL) 50 MG tablet TAKE 1 TABLET BY MOUTH EVERY DAY AT BEDTIME AS NEEDED FOR SLEEP 90 tablet 1    fluticasone (FLONASE) 50 MCG/ACT nasal spray SPRAY 1 SPRAY INTO EACH NOSTRIL EVERY DAY 16 g 1    cetirizine (ZYRTEC) 10 MG tablet Take by mouth      Flaxseed, Linseed, 1000 MG CAPS Take  by mouth.  fish oil-omega-3 fatty acids 1000 MG capsule Take 1,600 mg by mouth daily       multivitamin (THERAGRAN) per tablet Take 1 tablet by mouth daily.  Cholecalciferol (VITAMIN D) 2000 units CAPS capsule Take 2,000 Units by mouth daily.            Past Medical History:   Diagnosis Date    Acquired hypothyroidism 8/26/2018    Allergic rhinitis     Goiter, non-toxic     Hypertension     Hyperthyroidism 8/20/2018     Past Surgical History:   Procedure Laterality Date    CYST REMOVAL 07/18/2018    Scalp    EYE SURGERY      WISDOM TOOTH EXTRACTION       Family History   Problem Relation Age of Onset    High Blood Pressure Mother     High Cholesterol Mother     High Blood Pressure Father     High Cholesterol Father     Heart Disease Father     High Cholesterol Brother     Heart Disease Paternal Aunt     Stroke Paternal Uncle     Heart Disease Paternal Uncle     Stroke Maternal Grandmother     Arthritis Paternal Grandmother     Heart Disease Paternal Grandfather      Social History     Socioeconomic History    Marital status:      Spouse name: Not on file    Number of children: Not on file    Years of education: Not on file    Highest education level: Not on file   Occupational History    Not on file   Tobacco Use    Smoking status: Never Smoker    Smokeless tobacco: Never Used   Vaping Use    Vaping Use: Never used   Substance and Sexual Activity    Alcohol use: Not Currently     Comment: 1-2 per month on average    Drug use: No    Sexual activity: Yes   Other Topics Concern    Not on file   Social History Narrative    Not on file     Social Determinants of Health     Financial Resource Strain: Low Risk     Difficulty of Paying Living Expenses: Not hard at all   Food Insecurity: No Food Insecurity    Worried About Running Out of Food in the Last Year: Never true    Anjana of Food in the Last Year: Never true   Transportation Needs: No Transportation Needs    Lack of Transportation (Medical): No    Lack of Transportation (Non-Medical):  No   Physical Activity:     Days of Exercise per Week: Not on file    Minutes of Exercise per Session: Not on file   Stress:     Feeling of Stress : Not on file   Social Connections:     Frequency of Communication with Friends and Family: Not on file    Frequency of Social Gatherings with Friends and Family: Not on file    Attends Restoration Services: Not on file    Active Member of Clubs or Organizations: Not on file   Lilliam Sanchez Attends Club or Organization Meetings: Not on file    Marital Status: Not on file   Intimate Partner Violence:     Fear of Current or Ex-Partner: Not on file    Emotionally Abused: Not on file    Physically Abused: Not on file    Sexually Abused: Not on file   Housing Stability: Unknown    Unable to Pay for Housing in the Last Year: No    Number of [place] in the Last Year: Not on file    Unstable Housing in the Last Year: No       Reviewof Systems:  Review of Systems   Constitutional: Negative for fatigue. HENT: Positive for postnasal drip. Negative for congestion. Eyes: Negative for visual disturbance. Respiratory: Negative for shortness of breath. Cardiovascular: Negative for chest pain. Gastrointestinal: Negative for abdominal pain. Endocrine: Negative for cold intolerance and heat intolerance. Genitourinary: Negative for difficulty urinating. Musculoskeletal: Negative for back pain and neck pain. Skin:        Right leg vein   Allergic/Immunologic: Positive for environmental allergies. Neurological: Negative for dizziness and headaches. Hematological: Does not bruise/bleed easily. Psychiatric/Behavioral: Negative for dysphoric mood. The patient is not nervous/anxious. PhysicalExam:   Vitals:    06/02/22 0916   BP: 128/78   Site: Left Upper Arm   Position: Sitting   Cuff Size: Medium Adult   Pulse: 67   Resp: 16   Temp: 97.1 °F (36.2 °C)   TempSrc: Temporal   SpO2: 98%   Weight: 171 lb 12.8 oz (77.9 kg)   Height: 5' 6.3\" (1.684 m)     Body mass index is 27.48 kg/m². Physical Exam  Vitals reviewed. Constitutional:       Appearance: Normal appearance. She is well-developed. HENT:      Head: Normocephalic and atraumatic.       Right Ear: Tympanic membrane and external ear normal.      Left Ear: Tympanic membrane and external ear normal.      Nose: Nose normal.      Mouth/Throat:      Mouth: Mucous membranes are moist.   Eyes:      Conjunctiva/sclera: Conjunctivae normal.      Pupils: Pupils are equal, round, and reactive to light. Cardiovascular:      Rate and Rhythm: Normal rate and regular rhythm. Heart sounds: Normal heart sounds. No murmur heard. Pulmonary:      Effort: Pulmonary effort is normal.      Breath sounds: Normal breath sounds. No wheezing. Abdominal:      General: Bowel sounds are normal.      Palpations: Abdomen is soft. Tenderness: There is no abdominal tenderness. Musculoskeletal:         General: Normal range of motion. Cervical back: Normal range of motion. No rigidity or tenderness. Comments: Bilateral UE/LE normal ROM   Skin:     General: Skin is warm and dry. Comments: Enlarged vein extending from proximal right anterior thigh to right calf - non-tender   Neurological:      Mental Status: She is alert and oriented to person, place, and time. Cranial Nerves: No cranial nerve deficit. Sensory: No sensory deficit. Motor: No weakness. Coordination: Coordination normal.      Gait: Gait normal.      Deep Tendon Reflexes: Reflexes are normal and symmetric. Psychiatric:         Behavior: Behavior normal.         Thought Content: Thought content normal.         Judgment: Judgment normal.         Assessment/Plan:   Diagnosis Orders   1. Annual physical exam     2. Vein symptom  Lisa Shukla MD, Vascular Surgery, [facility]     Return in about 1 year (around 6/2/2023) for Physical Exam.    Prior to Visit Medications    Medication Sig Taking?  Authorizing Provider   amoxicillin (AMOXIL) 500 mg capsule  Yes Historical Provider, MD   levothyroxine (SYNTHROID) 25 MCG tablet TAKE 1 TABLET BY MOUTH EVERY DAY Yes Hussain Nunez MD   traZODone (DESYREL) 50 MG tablet TAKE 1 TABLET BY MOUTH EVERY DAY AT BEDTIME AS NEEDED FOR SLEEP Yes Maty Nolan DO   fluticasone (FLONASE) 50 MCG/ACT nasal spray SPRAY 1 SPRAY INTO EACH NOSTRIL EVERY DAY Yes Maty Nolan DO   cetirizine (ZYRTEC) 10 MG

## 2022-06-02 ENCOUNTER — OFFICE VISIT (OUTPATIENT)
Dept: FAMILY MEDICINE CLINIC | Age: 37
End: 2022-06-02
Payer: COMMERCIAL

## 2022-06-02 VITALS
BODY MASS INDEX: 27.61 KG/M2 | DIASTOLIC BLOOD PRESSURE: 78 MMHG | HEART RATE: 67 BPM | SYSTOLIC BLOOD PRESSURE: 128 MMHG | OXYGEN SATURATION: 98 % | WEIGHT: 171.8 LBS | TEMPERATURE: 97.1 F | RESPIRATION RATE: 16 BRPM | HEIGHT: 66 IN

## 2022-06-02 DIAGNOSIS — R09.89 VEIN SYMPTOM: ICD-10-CM

## 2022-06-02 DIAGNOSIS — Z00.00 ANNUAL PHYSICAL EXAM: Primary | ICD-10-CM

## 2022-06-02 PROCEDURE — 99395 PREV VISIT EST AGE 18-39: CPT | Performed by: FAMILY MEDICINE

## 2022-06-02 RX ORDER — AMOXICILLIN 500 MG/1
CAPSULE ORAL
COMMUNITY
Start: 2022-05-29 | End: 2022-07-22 | Stop reason: ALTCHOICE

## 2022-06-02 ASSESSMENT — ENCOUNTER SYMPTOMS
BACK PAIN: 0
SHORTNESS OF BREATH: 0
ABDOMINAL PAIN: 0

## 2022-06-02 ASSESSMENT — PATIENT HEALTH QUESTIONNAIRE - PHQ9
SUM OF ALL RESPONSES TO PHQ QUESTIONS 1-9: 0
SUM OF ALL RESPONSES TO PHQ QUESTIONS 1-9: 0
1. LITTLE INTEREST OR PLEASURE IN DOING THINGS: 0
SUM OF ALL RESPONSES TO PHQ QUESTIONS 1-9: 0
2. FEELING DOWN, DEPRESSED OR HOPELESS: 0
SUM OF ALL RESPONSES TO PHQ QUESTIONS 1-9: 0
SUM OF ALL RESPONSES TO PHQ9 QUESTIONS 1 & 2: 0

## 2022-06-03 ENCOUNTER — TELEMEDICINE (OUTPATIENT)
Dept: FAMILY MEDICINE CLINIC | Age: 37
End: 2022-06-03
Payer: COMMERCIAL

## 2022-06-03 ENCOUNTER — NURSE TRIAGE (OUTPATIENT)
Dept: OTHER | Facility: CLINIC | Age: 37
End: 2022-06-03

## 2022-06-03 DIAGNOSIS — M54.2 NECK PAIN: ICD-10-CM
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
## 2022-06-03 DIAGNOSIS — M54.6 ACUTE BILATERAL THORACIC BACK PAIN: ICD-10-CM

## 2022-06-03 PROCEDURE — 99213 OFFICE O/P EST LOW 20 MIN: CPT | Performed by: FAMILY MEDICINE

## 2022-06-03 RX ORDER — METHYLPREDNISOLONE 4 MG/1
TABLET ORAL
Qty: 1 KIT | Refills: 0 | Status: SHIPPED | OUTPATIENT
Start: 2022-06-03 | End: 2022-06-09

## 2022-06-03 RX ORDER — TIZANIDINE 4 MG/1
4 TABLET ORAL EVERY 8 HOURS PRN
Qty: 15 TABLET | Refills: 1 | Status: SHIPPED | OUTPATIENT
Start: 2022-06-03 | End: 2022-06-08

## 2022-06-03 RX ORDER — MELOXICAM 15 MG/1
15 TABLET ORAL DAILY
Qty: 14 TABLET | Refills: 1 | Status: CANCELLED | OUTPATIENT
Start: 2022-06-03

## 2022-06-03 ASSESSMENT — ENCOUNTER SYMPTOMS: BACK PAIN: 1
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
## 2022-06-23 ENCOUNTER — OFFICE VISIT (OUTPATIENT)
Dept: VASCULAR SURGERY | Age: 37
End: 2022-06-23
Payer: COMMERCIAL

## 2022-06-23 VITALS — WEIGHT: 170 LBS | BODY MASS INDEX: 27.32 KG/M2 | HEIGHT: 66 IN

## 2022-06-23 DIAGNOSIS — I83.91 ASYMPTOMATIC VARICOSE VEINS OF RIGHT LOWER EXTREMITY: Primary | ICD-10-CM

## 2022-06-23 PROCEDURE — 99202 OFFICE O/P NEW SF 15 MIN: CPT | Performed by: SURGERY

## 2022-06-23 ASSESSMENT — ENCOUNTER SYMPTOMS
GASTROINTESTINAL NEGATIVE: 1
EYES NEGATIVE: 1
RESPIRATORY NEGATIVE: 1

## 2022-06-23 NOTE — Clinical Note
Dr. Luis Miguel Mckeon,    I saw Gino Hess in the office today at [facility] for evaluation of her right leg varicosities. As you know these are asymptomatic however further work-up with scanning will be necessary to rule out the possibility of associated venous reflux before we consider any treatment including sclerotherapy. She will take this under consideration I will see her back in the future should she decide to pursue this. If you have any questions please feel free to contact me. Thanks for asked me to see her and please let me know if I can help you with any other patients in the future.     Mady Ordonez

## 2022-06-23 NOTE — PROGRESS NOTES
Subjective:      Patient ID: Constantine Govea is a 42 y.o. female. HPI    Review of Systems   Constitutional: Negative. HENT: Negative. Eyes: Negative. Respiratory: Negative. Cardiovascular: Negative. Gastrointestinal: Negative. Endocrine: Negative. Genitourinary: Negative. Musculoskeletal: Negative. Skin: Negative. Allergic/Immunologic: Positive for environmental allergies. Neurological: Negative. Hematological: Negative. Psychiatric/Behavioral: Negative.         Objective:   Physical Exam    Assessment:      ***      Plan:      ***

## 2022-06-23 NOTE — PROGRESS NOTES
[facility]        New Patient Screening History and Physical  Date: [unfilled]   Name: Mansi Leonardo    Ht 5' 6\" (1.676 m)   Wt 170 lb (77.1 kg)   BMI 27.44 kg/m²     REASON FOR VISIT: LEG(S)      LEG(S)   [] Symptomatic L R Other    L R Other  Pain   [] []     Fatigue [] []      Superficial Phlebitis [] []     Swelling [] []      Aching   [] []     Bleeding [] []      Numbness  [] []     Burning [] []      FUNCTIONAL IMPAIRMENTAFFECTING ADLS/PTS ABILITYTO WORK[]YES[]NO. IF YES, DESCRIBE                                  [x] Cosmetic    PHYSICAL EXMINATION:  LEG(S)    FOR VARICOSE VEINS:  LESION/VEIN TYPE  L R FACE OTHER   L LEG: [] 4-7 mm  [] 8-10 mm[]>10mm  Varicose Vein   [] [x] [] Lateral leg   R LEG: [x] 4-7 mm [] 8-10 mm[] >10mm  Reticular Vein (2mm-5mm) [] [] []   Other:        Spider Vein (0.2-1mm) [] [] []    [] 4-7 mm [] 8-10 mm [] >10mm  [] Port-Wine Stain      []Hemangioma        LOCATION:       LOCATION:        []  <10.0 cm2       SIZE:         [] 10.0  50 cm2   [] >50.0 cm2     NOTES: Became more obvious with 70 lb weight loss                          PREVIOUS TREATMENT   LEG(S)  [x] NO [] YES  IF YES:  L R FACE OTHER      Sclerotherapy  [] [] []   Compression Hose []     Surgery  [] []    Pain Medication []      Laser   [] [] []   Elevation  []     Photoderm  [] []    Exercise  []     Other:                               RECOMMENDATIONS: LEG(S)      LEG(S)     L R OTHER    L R FACE    OTHER  SCLEROTHERAPY [] []         LASER/LIGHT TX [] [] []     DUPLEX  [] [x]   SURGERY  [] []  COMPRESSION HOSE [x]  NO FOLLOW UP []  DO NOT CALL  []  OTHER:                                   COMMENTS/NOTES: Possible sclero if no associated causative reflux documented. Will consider.       Floyd Carty MD

## 2022-07-20 DIAGNOSIS — R73.01 IFG (IMPAIRED FASTING GLUCOSE): ICD-10-CM

## 2022-07-20 DIAGNOSIS — E03.9 ACQUIRED HYPOTHYROIDISM: ICD-10-CM

## 2022-07-20 DIAGNOSIS — E55.9 VITAMIN D DEFICIENCY: ICD-10-CM

## 2022-07-20 LAB
A/G RATIO: 1.7 (ref 1.1–2.2)
ALBUMIN SERPL-MCNC: 4.5 G/DL (ref 3.4–5)
ALP BLD-CCNC: 54 U/L (ref 40–129)
ALT SERPL-CCNC: 14 U/L (ref 10–40)
ANION GAP SERPL CALCULATED.3IONS-SCNC: 10 MMOL/L (ref 3–16)
AST SERPL-CCNC: 19 U/L (ref 15–37)
BILIRUB SERPL-MCNC: 0.6 MG/DL (ref 0–1)
BUN BLDV-MCNC: 13 MG/DL (ref 7–20)
CALCIUM SERPL-MCNC: 9.5 MG/DL (ref 8.3–10.6)
CHLORIDE BLD-SCNC: 100 MMOL/L (ref 99–110)
CO2: 28 MMOL/L (ref 21–32)
CREAT SERPL-MCNC: 0.8 MG/DL (ref 0.6–1.1)
GFR AFRICAN AMERICAN: >60
GFR NON-AFRICAN AMERICAN: >60
GLUCOSE BLD-MCNC: 93 MG/DL (ref 70–99)
POTASSIUM SERPL-SCNC: 4.3 MMOL/L (ref 3.5–5.1)
SODIUM BLD-SCNC: 138 MMOL/L (ref 136–145)
T3 FREE: 2.8 PG/ML (ref 2.3–4.2)
T4 FREE: 1.2 NG/DL (ref 0.9–1.8)
TOTAL PROTEIN: 7.1 G/DL (ref 6.4–8.2)
TSH SERPL DL<=0.05 MIU/L-ACNC: 1.31 UIU/ML (ref 0.27–4.2)
VITAMIN D 25-HYDROXY: 92.3 NG/ML

## 2022-07-22 ENCOUNTER — OFFICE VISIT (OUTPATIENT)
Dept: ENDOCRINOLOGY | Age: 37
End: 2022-07-22
Payer: COMMERCIAL

## 2022-07-22 VITALS
BODY MASS INDEX: 27.32 KG/M2 | SYSTOLIC BLOOD PRESSURE: 106 MMHG | HEIGHT: 66 IN | HEART RATE: 65 BPM | WEIGHT: 170 LBS | RESPIRATION RATE: 14 BRPM | TEMPERATURE: 98 F | DIASTOLIC BLOOD PRESSURE: 70 MMHG

## 2022-07-22 DIAGNOSIS — Z86.39 HISTORY OF HYPERTHYROIDISM: ICD-10-CM

## 2022-07-22 DIAGNOSIS — R73.01 IFG (IMPAIRED FASTING GLUCOSE): ICD-10-CM

## 2022-07-22 DIAGNOSIS — E55.9 VITAMIN D DEFICIENCY: ICD-10-CM

## 2022-07-22 DIAGNOSIS — E06.3 HASHIMOTO'S THYROIDITIS: ICD-10-CM

## 2022-07-22 DIAGNOSIS — E66.3 OVERWEIGHT (BMI 25.0-29.9): ICD-10-CM

## 2022-07-22 DIAGNOSIS — E78.2 MIXED HYPERLIPIDEMIA: ICD-10-CM

## 2022-07-22 DIAGNOSIS — E04.2 MULTINODULAR GOITER: ICD-10-CM

## 2022-07-22 DIAGNOSIS — L65.9 HAIR LOSS: ICD-10-CM

## 2022-07-22 DIAGNOSIS — E03.9 ACQUIRED HYPOTHYROIDISM: Primary | ICD-10-CM

## 2022-07-22 PROCEDURE — 99214 OFFICE O/P EST MOD 30 MIN: CPT | Performed by: INTERNAL MEDICINE

## 2022-07-22 RX ORDER — LEVOTHYROXINE SODIUM 0.03 MG/1
TABLET ORAL
Qty: 90 TABLET | Refills: 1 | Status: SHIPPED | OUTPATIENT
Start: 2022-07-22 | End: 2022-11-04 | Stop reason: ALTCHOICE

## 2022-07-22 NOTE — PROGRESS NOTES
SUBJECTIVE:  Adonis Mendoza is a 42 y.o. female who is here for a hyperthyroidism, multinodular goiter. 1. Hypothyroidism    This started in 2001. Patient was diagnosed with MNG. The problem has been worsening. Previous thyroid studies include: TSH and free thyroxine. On levothyroxine. Current complaints: improved insomnia, fatigue, hair loss. Fatigue is better. Sleeps well. Hair loss worse. Had a lot of stress, father, brother passed away recently. 2. Multinodular goiter    In 2001 she was diagnosed with 1.5 cm cystic nodule. Repeated US - right 1.4 cm, right superior 1.7 cm, left subcm nodules. Dr. Pablo Arias follows. History of obstructive symptoms: difficulty swallowing No, changes in voice/hoarseness No.  History of radiation to patient's neck: No  Resent iodine exposure: No  Family history includes thyroid abnormalities. Family history of thyroid cancer: No    3. Overweight  Eats moderately healthy. On Optavia   Lost 70 lbs on [employer]avia    2. History of Hyperthyroidism    No diarrhea. Hair loss postpartum. Calcium 10.6, ULN 10.5. Had son born 3/28/2018. Has another boy 5yo  Has Mary implant for birth control. Had short periods since 3/2018.    5. Hashimoto's thyroiditis  Has fatigue. 6. Postpartum thyroiditis  Has fatigue. Thyroid sono copy from Dr. Pablo Arias note: The patient was placed in a semi-recumbent position with mild neck extension. Real time B-mode ultrasound on the neck was performed in transverse/ axial and longitudinal/ sagittal planes. Stable heterogenous MNG with cyto nondiagnostic R inf predominately cystic 1.1cm (prior1.4cm) with R sup solid 1.5cm (prior 1.7cm) and L inf solid hypo 1.1cm without suspicious features. Bilateral infrathyroid LNs benign appearing likely reactive. The patient tolerated the procedure well and without side effects. 7. IFG  Glucose 102    8. Vitamin D deficiency  Has fatigue    9.  Hyperlipidemia  Father had MI at age 46  No HTN, diabetes  No smoking    10. Hair loss  Moderate    Name: Smiley Malcolm   :  1985   MRN:  [id number]       [facility]  Reason:  THYROID NODULE  Dict. Staff: Sue Seth [id number]    Verified By: Janine Capri: 09  11:51 am  Exams:  US-THYROID/NECK/HEAD      Thyroid ultrasound on 2009    History: Thyroid nodule    Comparison: None    Findings: The right lobe of the gland measures greater than 5 cm in  craniocaudal dimension. It extends beyond the confines of the  transducer. Transversely, it measures 2.4 x 2.5 cm. The left  lobe measures 4.8 x 2.4 x 1.6 cm. The thyroid demonstrates  diffusely heterogeneous echogenicity. There is coarsening of the  echotexture. Within the right lobe, there is a 1.3 x 0.7 x 0.9  cm cyst. There is also a hypoechoic nodule measuring 1.2 x 0.9 x  0.7 cm. The remainder of the gland is diffusely heterogeneous  and may contain additional small hypoechoic nodules. Impression:    Diffusely heterogeneous thyroid gland. There is a discrete cyst  and a discrete nodule seen within the right lobe as described  above.   * end of result *   Status         Past Medical History:   Diagnosis Date    Acquired hypothyroidism 2018    Allergic rhinitis     Goiter, non-toxic     Hypertension     Hyperthyroidism 2018     Patient Active Problem List    Diagnosis Date Noted    Overweight (BMI 25.0-29.9) 2022    Hair loss 2022    Class 1 obesity with body mass index (BMI) of 30.0 to 30.9 in adult 2022    COVID-19 2021    IFG (impaired fasting glucose) 2020    Postpartum thyroiditis 2018    Hashimoto's thyroiditis 2018    Acquired hypothyroidism 2018    History of hyperthyroidism 2018    Class 2 obesity with body mass index (BMI) of 38.0 to 38.9 in adult 2018    GDM (gestational diabetes mellitus), class A1 01/10/2018    Psychophysiological insomnia 2017    Allergic rhinitis 07/20/2016    Multinodular goiter 05/02/2011    Mixed hyperlipidemia 05/02/2011    Vitamin D deficiency 05/02/2011    Anxiety 05/02/2011    Elevated blood pressure reading without diagnosis of hypertension 05/02/2011     Past Surgical History:   Procedure Laterality Date    CYST REMOVAL  07/18/2018    Scalp    EYE SURGERY      WISDOM TOOTH EXTRACTION       Family History   Problem Relation Age of Onset    High Blood Pressure Mother     High Cholesterol Mother     High Blood Pressure Father     High Cholesterol Father     Heart Disease Father     High Cholesterol Brother     Heart Disease Paternal Aunt     Stroke Paternal Uncle     Heart Disease Paternal Uncle     Stroke Maternal Grandmother     Arthritis Paternal Grandmother     Heart Disease Paternal Grandfather      Social History     Socioeconomic History    Marital status:      Spouse name: None    Number of children: None    Years of education: None    Highest education level: None   Tobacco Use    Smoking status: Never    Smokeless tobacco: Never   Vaping Use    Vaping Use: Never used   Substance and Sexual Activity    Alcohol use: Not Currently     Comment: 1-2 per month on average    Drug use: No    Sexual activity: Yes     Social Determinants of Health     Financial Resource Strain: Low Risk     Difficulty of Paying Living Expenses: Not hard at all   Food Insecurity: No Food Insecurity    Worried About Running Out of Food in the Last Year: Never true    Ran Out of Food in the Last Year: Never true   Transportation Needs: No Transportation Needs    Lack of Transportation (Medical): No    Lack of Transportation (Non-Medical): No   Housing Stability: Unknown    Unable to Pay for Housing in the Last Year: No    Unstable Housing in the Last Year: No     Current Outpatient Medications   Medication Sig Dispense Refill    levothyroxine (SYNTHROID) 25 MCG tablet TAKE 1 TABLET BY MOUTH EVERY DAY 90 tablet 1    traZODone (DESYREL) 50 MG tablet TAKE 1 TABLET BY MOUTH EVERY DAY AT BEDTIME AS NEEDED FOR SLEEP 90 tablet 1    fluticasone (FLONASE) 50 MCG/ACT nasal spray SPRAY 1 SPRAY INTO EACH NOSTRIL EVERY DAY 16 g 1    cetirizine (ZYRTEC) 10 MG tablet Take by mouth      Flaxseed, Linseed, 1000 MG CAPS Take  by mouth. fish oil-omega-3 fatty acids 1000 MG capsule Take 1,600 mg by mouth daily       multivitamin (THERAGRAN) per tablet Take 1 tablet by mouth daily. Cholecalciferol (VITAMIN D) 2000 units CAPS capsule Take 2,000 Units by mouth daily. No current facility-administered medications for this visit.      Allergies   Allergen Reactions    Prednisone      flushing     Family Status   Relation Name Status    Mother  Alive    Father      Brother  Alive    Vadim Leon        one  from sudden death    Counts include 234 beds at the [facility]      MGM      [facility]  Alive    [facility]      MGF  Alive        thyroid, gout    Brother         Review of Systems:  Constitutional: has fatigue, no fever, has recent weight gain, no recent weight loss, no changes in appetite  Eyes: no eye pain, no change in vision, no eye redness, no eye irritation, no double vision  Ears, nose, throat: no nasal congestion, no sore throat, no earache, no decrease in hearing, no hoarseness, no dry mouth, no sinus problems, no difficulty swallowing, has neck lumps, no dental problems, no mouth sores, no ringing in ears  Pulmonary: no shortness of breath, no wheezing, no dyspnea on exertion, no cough  Cardiovascular: no chest pain, no lower extremity edema, no orthopnea, no intermittent leg claudication, no palpitations  Gastrointestinal: no abdominal pain, no nausea, no vomiting, no diarrhea, no constipation, no dysphagia, no heartburn, no bloating  Genitourinary: no dysuria, no urinary incontinence, no urinary hesitancy, no urinary frequency, no feelings of urinary urgency, no nocturia  Musculoskeletal: no joint swelling, no joint stiffness, no joint pain, no muscle cramps, no muscle pain, no bone pain  Integument/Breast: has hair loss, no skin rashes, no skin lesions, no itching, no dry skin  Neurological: no numbness, no tingling, no weakness, no confusion, no headaches, no dizziness, no fainting, no tremors, no decrease in memory, no balance problems  Psychiatric: has anxiety, no depression, has insomnia  Hematologic/Lymphatic: no tendency for easy bleeding, no swollen lymph nodes, no tendency for easy bruising  Immunology: has seasonal allergies, no frequent infections, no frequent illnesses  Endocrine: no temperature intolerance    /70   Pulse 65   Temp 98 °F (36.7 °C)   Resp 14   Ht 5' 6\" (1.676 m)   Wt 170 lb (77.1 kg)   BMI 27.44 kg/m²    Wt Readings from Last 3 Encounters:   07/22/22 170 lb (77.1 kg)   06/23/22 170 lb (77.1 kg)   06/02/22 171 lb 12.8 oz (77.9 kg)     Body mass index is 27.44 kg/m².     OBJECTIVE:  Constitutional: no acute distress, well appearing and well nourished  Psychiatric: oriented to person, place and time, judgement and insight and normal, recent and remote memory and intact and mood and affect are normal  Skin: skin and subcutaneous tissue is normal without mass, normal turgor  Head and Face: examination of head and face revealed no abnormalities  Eyes: no lid or conjunctival swelling, erythema or discharge, pupils are normal, equal, round, reactive to light  Ears/Nose: external inspection of ears and nose revealed no abnormalities, hearing is grossly normal  Oropharynx/Mouth/Face: lips, tongue and gums are normal with no lesions, the voice quality was normal  Neck: neck is supple and symmetric, with midline trachea and no masses, thyroid is enlarged  Lymphatics: normal cervical lymph nodes, normal supraclavicular nodes  Pulmonary: no increased work of breathing or signs of respiratory distress, lungs are clear to auscultation  Cardiovascular: normal heart rate and rhythm, normal S1 and S2, no murmurs and pedal pulses and 2+ bilaterally, No edema  Abdomen: abdomen is soft, non-tender with no masses  Musculoskeletal: normal gait and station and exam of the digits and nails are normal  Neurological: normal coordination and normal general cortical function      Lab Review:    Lab Results   Component Value Date/Time    WBC 6.9 09/03/2020 08:11 AM    HGB 13.8 09/03/2020 08:11 AM    HCT 40.9 09/03/2020 08:11 AM    MCV 87.8 09/03/2020 08:11 AM     09/03/2020 08:11 AM     Lab Results   Component Value Date/Time     07/20/2022 08:36 AM    K 4.3 07/20/2022 08:36 AM     07/20/2022 08:36 AM    CO2 28 07/20/2022 08:36 AM    BUN 13 07/20/2022 08:36 AM    CREATININE 0.8 07/20/2022 08:36 AM    GLUCOSE 93 07/20/2022 08:36 AM    GLUCOSE 83 10/22/2011 10:55 AM    CALCIUM 9.5 07/20/2022 08:36 AM    PROT 7.1 07/20/2022 08:36 AM    PROT 7.1 10/22/2011 10:55 AM    LABALBU 4.5 07/20/2022 08:36 AM    BILITOT 0.6 07/20/2022 08:36 AM    ALKPHOS 54 07/20/2022 08:36 AM    AST 19 07/20/2022 08:36 AM    ALT 14 07/20/2022 08:36 AM    LABGLOM >60 07/20/2022 08:36 AM    GFRAA >60 07/20/2022 08:36 AM    GFRAA 98 10/22/2011 10:55 AM    AGRATIO 1.7 07/20/2022 08:36 AM    GLOB 2.6 05/12/2021 09:08 AM     Lab Results   Component Value Date/Time    TSH 1.31 07/20/2022 08:36 AM    FT3 2.8 07/20/2022 08:36 AM     Lab Results   Component Value Date/Time    LABA1C 5.0 11/24/2021 07:43 AM     Lab Results   Component Value Date/Time    EAG 96.8 11/24/2021 07:43 AM     Lab Results   Component Value Date/Time    CHOL 174 03/25/2022 08:48 AM     Lab Results   Component Value Date/Time    TRIG 36 03/25/2022 08:48 AM     Lab Results   Component Value Date/Time    HDL 59 03/25/2022 08:48 AM    HDL 51 03/14/2012 12:52 PM     Lab Results   Component Value Date/Time    LDLCALC 108 03/25/2022 08:48 AM     Lab Results   Component Value Date/Time    LABVLDL 7 03/25/2022 08:48 AM     No results found for: CHOLHDLRATIO  No results found for: LABMICR, [id number]  Lab Results   Component Value disease if inadequately treated, side effects of medications, diagnosis, treatment options, and prognosis, risks, benefits, complications, and alternatives of treatment, labs, imaging and other studies and treatment targets and goals, thyorid nodules, hyperthyroidism causes treatment. .  She understands instructions and counseling. Return in about 3 months (around 10/22/2022) for thyroid problems.

## 2022-08-17 DIAGNOSIS — I83.91 ASYMPTOMATIC VARICOSE VEINS OF RIGHT LOWER EXTREMITY: Primary | ICD-10-CM

## 2022-08-18 ENCOUNTER — PROCEDURE VISIT (OUTPATIENT)
Dept: VASCULAR SURGERY | Age: 37
End: 2022-08-18

## 2022-08-18 ENCOUNTER — OFFICE VISIT (OUTPATIENT)
Dept: VASCULAR SURGERY | Age: 37
End: 2022-08-18
Payer: COMMERCIAL

## 2022-08-18 VITALS — BODY MASS INDEX: 27.32 KG/M2 | HEIGHT: 66 IN | WEIGHT: 170 LBS

## 2022-08-18 DIAGNOSIS — I83.91 ASYMPTOMATIC VARICOSE VEINS OF RIGHT LOWER EXTREMITY: Primary | ICD-10-CM

## 2022-08-18 DIAGNOSIS — I83.891 SYMPTOMATIC VARICOSE VEINS OF RIGHT LOWER EXTREMITY: Primary | ICD-10-CM

## 2022-08-18 DIAGNOSIS — I83.91 ASYMPTOMATIC VARICOSE VEINS OF RIGHT LOWER EXTREMITY: ICD-10-CM

## 2022-08-18 PROCEDURE — 99213 OFFICE O/P EST LOW 20 MIN: CPT | Performed by: SURGERY

## 2022-08-18 NOTE — PROGRESS NOTES
Seen back for asymptomatic varicose veins R leg. Pt has not begun been wearing stockings. No reported edema, bleeding, tender cord, skin changes or ulceration. Recent venous reflux scan performed on 8/18/2022 at [facility]. EXAM:  No edema, ulceration or dermatitis. All VVs soft, nontender without erythema. VRS - trace R CFV reflux with R SFJ and AASV reflux. Also Giacomini reflux    A/P: Chronic superficial venous insufficiency R leg with secondary asymptomatic varicose veins. As cosmetic options include self pay for R SFJ ligation/division + R AASV RFA + stab phlebectomies OR sclerotherapy. The surgical approach would have a much higher likelihood of success as opposed to sclerotherapy being done in the face of proximal causative reflux. Another option is to delay and observe for the development of appropriate symptoms that might justify medical indication for treatment. The results of the ultrasound and discussion of options lasted 20 minutes. All questions were answered and the patient is comfortable with my explanation.   It appears at this time she will observe her presenting varicosities and return to see us in the future should she develop any symptoms or decide to proceed with cosmetic treatment

## 2022-11-02 DIAGNOSIS — E78.2 MIXED HYPERLIPIDEMIA: ICD-10-CM

## 2022-11-02 DIAGNOSIS — L65.9 HAIR LOSS: ICD-10-CM

## 2022-11-02 DIAGNOSIS — E04.2 MULTINODULAR GOITER: ICD-10-CM

## 2022-11-02 DIAGNOSIS — E03.9 ACQUIRED HYPOTHYROIDISM: ICD-10-CM

## 2022-11-02 DIAGNOSIS — E55.9 VITAMIN D DEFICIENCY: ICD-10-CM

## 2022-11-02 DIAGNOSIS — R73.01 IFG (IMPAIRED FASTING GLUCOSE): ICD-10-CM

## 2022-11-02 LAB
A/G RATIO: 1.8 (ref 1.1–2.2)
ALBUMIN SERPL-MCNC: 4.5 G/DL (ref 3.4–5)
ALP BLD-CCNC: 61 U/L (ref 40–129)
ALT SERPL-CCNC: 17 U/L (ref 10–40)
ANION GAP SERPL CALCULATED.3IONS-SCNC: 9 MMOL/L (ref 3–16)
AST SERPL-CCNC: 18 U/L (ref 15–37)
BILIRUB SERPL-MCNC: 0.7 MG/DL (ref 0–1)
BUN BLDV-MCNC: 14 MG/DL (ref 7–20)
CALCIUM SERPL-MCNC: 9.8 MG/DL (ref 8.3–10.6)
CHLORIDE BLD-SCNC: 97 MMOL/L (ref 99–110)
CO2: 28 MMOL/L (ref 21–32)
CREAT SERPL-MCNC: 0.8 MG/DL (ref 0.6–1.1)
GFR SERPL CREATININE-BSD FRML MDRD: >60 ML/MIN/{1.73_M2}
GLUCOSE BLD-MCNC: 86 MG/DL (ref 70–99)
POTASSIUM SERPL-SCNC: 4.7 MMOL/L (ref 3.5–5.1)
SODIUM BLD-SCNC: 134 MMOL/L (ref 136–145)
T3 FREE: 3 PG/ML (ref 2.3–4.2)
T4 FREE: 1.2 NG/DL (ref 0.9–1.8)
TOTAL PROTEIN: 7 G/DL (ref 6.4–8.2)
TSH SERPL DL<=0.05 MIU/L-ACNC: 1.4 UIU/ML (ref 0.27–4.2)
VITAMIN D 25-HYDROXY: 66.1 NG/ML

## 2022-11-04 ENCOUNTER — OFFICE VISIT (OUTPATIENT)
Dept: ENDOCRINOLOGY | Age: 37
End: 2022-11-04
Payer: COMMERCIAL

## 2022-11-04 VITALS
HEART RATE: 64 BPM | TEMPERATURE: 98 F | BODY MASS INDEX: 28.45 KG/M2 | OXYGEN SATURATION: 99 % | DIASTOLIC BLOOD PRESSURE: 64 MMHG | WEIGHT: 177 LBS | SYSTOLIC BLOOD PRESSURE: 114 MMHG | RESPIRATION RATE: 14 BRPM | HEIGHT: 66 IN

## 2022-11-04 DIAGNOSIS — Z86.39 HISTORY OF HYPERTHYROIDISM: ICD-10-CM

## 2022-11-04 DIAGNOSIS — E66.3 OVERWEIGHT (BMI 25.0-29.9): ICD-10-CM

## 2022-11-04 DIAGNOSIS — E55.9 VITAMIN D DEFICIENCY: ICD-10-CM

## 2022-11-04 DIAGNOSIS — E78.2 MIXED HYPERLIPIDEMIA: ICD-10-CM

## 2022-11-04 DIAGNOSIS — E06.3 HASHIMOTO'S THYROIDITIS: ICD-10-CM

## 2022-11-04 DIAGNOSIS — E03.9 ACQUIRED HYPOTHYROIDISM: Primary | ICD-10-CM

## 2022-11-04 DIAGNOSIS — E04.2 MULTINODULAR GOITER: ICD-10-CM

## 2022-11-04 DIAGNOSIS — R73.01 IFG (IMPAIRED FASTING GLUCOSE): ICD-10-CM

## 2022-11-04 PROCEDURE — 99214 OFFICE O/P EST MOD 30 MIN: CPT | Performed by: INTERNAL MEDICINE

## 2022-11-04 RX ORDER — TRAZODONE HYDROCHLORIDE 50 MG/1
TABLET ORAL
Qty: 90 TABLET | Refills: 1 | Status: SHIPPED | OUTPATIENT
Start: 2022-11-04

## 2022-11-04 RX ORDER — LEVOTHYROXINE SODIUM 0.03 MG/1
TABLET ORAL
Qty: 90 TABLET | Refills: 1 | Status: CANCELLED | OUTPATIENT
Start: 2022-11-04

## 2022-11-04 NOTE — PROGRESS NOTES
SUBJECTIVE:  Ángel Guevara is a 42 y.o. female who is here for a hyperthyroidism, multinodular goiter. 1. Hypothyroidism    This started in 2001. Patient was diagnosed with MNG. The problem has been worsening. Previous thyroid studies include: TSH and free thyroxine. On levothyroxine. Current complaints: fatigue, hair loss. Worsening anxiety, insomnia  Hair loss worse. Had a lot of stress, father, brother passed away recently. 2. Multinodular goiter    In 2001 she was diagnosed with 1.5 cm cystic nodule. Repeated US - right 1.4 cm, right superior 1.7 cm, left subcm nodules. Dr. Rudy Smallwood follows. History of obstructive symptoms: difficulty swallowing No, changes in voice/hoarseness No.  History of radiation to patient's neck: No  Resent iodine exposure: No  Family history includes thyroid abnormalities. Family history of thyroid cancer: No    3. Overweight  Eats moderately healthy. On Optavia   Lost 65 lbs on Optavia    4. History of Hyperthyroidism    No diarrhea. Hair loss postpartum. Calcium 10.6, ULN 10.5. Had son born 3/28/2018. Has another boy 3yo  Has Mary implant for birth control. Had short periods since 3/2018.    5. Hashimoto's thyroiditis  Has fatigue. 6. Postpartum thyroiditis  Has fatigue. 10/2021  Thyroid sono copy from Dr. Rudy Smallwood note: The patient was placed in a semi-recumbent position with mild neck extension. Real time B-mode ultrasound on the neck was performed in transverse/ axial and longitudinal/ sagittal planes. Stable heterogenous MNG with cyto nondiagnostic R inf predominately cystic 1.1cm (prior1.4cm) with R sup solid 1.5cm (prior 1.7cm) and L inf solid hypo 1.1cm without suspicious features. Bilateral infrathyroid LNs benign appearing likely reactive. The patient tolerated the procedure well and without side effects. 7. IFG  Glucose 102    8. Vitamin D deficiency  Has fatigue    9.  Hyperlipidemia  Father had MI at age 46  No HTN, diabetes  No smoking      Name: Irma Cruz   :  1985   MRN:  [id number]       [facility]  Reason:  THYROID NODULE  Dict. Staff: Karla James [id number]    Verified By: Jayant Pike: 09  11:51 am  Exams:  US-THYROID/NECK/HEAD      Thyroid ultrasound on 2009    History: Thyroid nodule    Comparison: None    Findings: The right lobe of the gland measures greater than 5 cm in  craniocaudal dimension. It extends beyond the confines of the  transducer. Transversely, it measures 2.4 x 2.5 cm. The left  lobe measures 4.8 x 2.4 x 1.6 cm. The thyroid demonstrates  diffusely heterogeneous echogenicity. There is coarsening of the  echotexture. Within the right lobe, there is a 1.3 x 0.7 x 0.9  cm cyst. There is also a hypoechoic nodule measuring 1.2 x 0.9 x  0.7 cm. The remainder of the gland is diffusely heterogeneous  and may contain additional small hypoechoic nodules. Impression:    Diffusely heterogeneous thyroid gland. There is a discrete cyst  and a discrete nodule seen within the right lobe as described  above.   * end of result *   Status         Past Medical History:   Diagnosis Date    Acquired hypothyroidism 2018    Allergic rhinitis     Goiter, non-toxic     Hypertension     Hyperthyroidism 2018     Patient Active Problem List    Diagnosis Date Noted    Overweight (BMI 25.0-29.9) 2022    Hair loss 2022    Class 1 obesity with body mass index (BMI) of 30.0 to 30.9 in adult 2022    COVID-19 2021    IFG (impaired fasting glucose) 2020    Postpartum thyroiditis 2018    Hashimoto's thyroiditis 2018    Acquired hypothyroidism 2018    History of hyperthyroidism 2018    Class 2 obesity with body mass index (BMI) of 38.0 to 38.9 in adult 2018    GDM (gestational diabetes mellitus), class A1 01/10/2018    Psychophysiological insomnia 2017    Allergic rhinitis 2016    Multinodular goiter 2011 Mixed hyperlipidemia 05/02/2011    Vitamin D deficiency 05/02/2011    Anxiety 05/02/2011    Elevated blood pressure reading without diagnosis of hypertension 05/02/2011     Past Surgical History:   Procedure Laterality Date    CYST REMOVAL  07/18/2018    Scalp    EYE SURGERY      WISDOM TOOTH EXTRACTION       Family History   Problem Relation Age of Onset    High Blood Pressure Mother     High Cholesterol Mother     High Blood Pressure Father     High Cholesterol Father     Heart Disease Father     High Cholesterol Brother     Heart Disease Paternal Aunt     Stroke Paternal Uncle     Heart Disease Paternal Uncle     Stroke Maternal Grandmother     Arthritis Paternal Grandmother     Heart Disease Paternal Grandfather      Social History     Socioeconomic History    Marital status:      Spouse name: None    Number of children: None    Years of education: None    Highest education level: None   Tobacco Use    Smoking status: Never    Smokeless tobacco: Never   Vaping Use    Vaping Use: Never used   Substance and Sexual Activity    Alcohol use: Not Currently     Comment: 1-2 per month on average    Drug use: No    Sexual activity: Yes     Current Outpatient Medications   Medication Sig Dispense Refill    traZODone (DESYREL) 50 MG tablet TAKE 1 TABLET BY MOUTH EVERY DAY AT BEDTIME AS NEEDED FOR SLEEP 90 tablet 1    fluticasone (FLONASE) 50 MCG/ACT nasal spray SPRAY 1 SPRAY INTO EACH NOSTRIL EVERY DAY 16 g 1    cetirizine (ZYRTEC) 10 MG tablet Take by mouth      Flaxseed, Linseed, 1000 MG CAPS Take  by mouth. fish oil-omega-3 fatty acids 1000 MG capsule Take 1,600 mg by mouth daily       multivitamin (THERAGRAN) per tablet Take 1 tablet by mouth daily. Cholecalciferol (VITAMIN D) 2000 units CAPS capsule Take 2,000 Units by mouth daily. No current facility-administered medications for this visit.      Allergies   Allergen Reactions    Prednisone      flushing     Family Status   Relation Name Status    Mother  Alive    Father      Brother  Alive    PAunt  Alive        one  from sudden death    PUn      MGM      PGM  Alive    PGF      MGF  Alive        thyroid, gout    Brother         Review of Systems:  Constitutional: has fatigue, no fever, has recent weight gain, no recent weight loss, no changes in appetite  Eyes: no eye pain, no change in vision, no eye redness, no eye irritation, no double vision  Ears, nose, throat: no nasal congestion, no sore throat, no earache, no decrease in hearing, no hoarseness, no dry mouth, no sinus problems, no difficulty swallowing, has neck lumps, no dental problems, no mouth sores, no ringing in ears  Pulmonary: no shortness of breath, no wheezing, no dyspnea on exertion, no cough  Cardiovascular: no chest pain, no lower extremity edema, no orthopnea, no intermittent leg claudication, no palpitations  Gastrointestinal: no abdominal pain, no nausea, no vomiting, no diarrhea, no constipation, no dysphagia, no heartburn, no bloating  Genitourinary: no dysuria, no urinary incontinence, no urinary hesitancy, no urinary frequency, no feelings of urinary urgency, no nocturia  Musculoskeletal: no joint swelling, no joint stiffness, no joint pain, no muscle cramps, no muscle pain, no bone pain  Integument/Breast: has hair loss, no skin rashes, no skin lesions, no itching, no dry skin  Neurological: no numbness, no tingling, no weakness, no confusion, no headaches, no dizziness, no fainting, no tremors, no decrease in memory, no balance problems  Psychiatric: has anxiety, no depression, has insomnia  Hematologic/Lymphatic: no tendency for easy bleeding, no swollen lymph nodes, no tendency for easy bruising  Immunology: has seasonal allergies, no frequent infections, no frequent illnesses  Endocrine: no temperature intolerance    /64   Pulse 64   Temp 98 °F (36.7 °C)   Resp 14   Ht 5' 6\" (1.676 m)   Wt 177 lb (80.3 kg) SpO2 99%   BMI 28.57 kg/m²    Wt Readings from Last 3 Encounters:   11/04/22 177 lb (80.3 kg)   08/18/22 170 lb (77.1 kg)   07/22/22 170 lb (77.1 kg)     Body mass index is 28.57 kg/m².     OBJECTIVE:  Constitutional: no acute distress, well appearing and well nourished  Psychiatric: oriented to person, place and time, judgement and insight and normal, recent and remote memory and intact and mood and affect are normal  Skin: skin and subcutaneous tissue is normal without mass, normal turgor  Head and Face: examination of head and face revealed no abnormalities  Eyes: no lid or conjunctival swelling, erythema or discharge, pupils are normal, equal, round, reactive to light  Ears/Nose: external inspection of ears and nose revealed no abnormalities, hearing is grossly normal  Oropharynx/Mouth/Face: lips, tongue and gums are normal with no lesions, the voice quality was normal  Neck: neck is supple and symmetric, with midline trachea and no masses, thyroid is enlarged  Lymphatics: normal cervical lymph nodes, normal supraclavicular nodes  Pulmonary: no increased work of breathing or signs of respiratory distress, lungs are clear to auscultation  Cardiovascular: normal heart rate and rhythm, normal S1 and S2, no murmurs and pedal pulses and 2+ bilaterally, No edema  Abdomen: abdomen is soft, non-tender with no masses  Musculoskeletal: normal gait and station and exam of the digits and nails are normal  Neurological: normal coordination and normal general cortical function      Lab Review:    Lab Results   Component Value Date/Time    WBC 6.9 09/03/2020 08:11 AM    HGB 13.8 09/03/2020 08:11 AM    HCT 40.9 09/03/2020 08:11 AM    MCV 87.8 09/03/2020 08:11 AM     09/03/2020 08:11 AM     Lab Results   Component Value Date/Time     11/02/2022 08:28 AM    K 4.7 11/02/2022 08:28 AM    CL 97 11/02/2022 08:28 AM    CO2 28 11/02/2022 08:28 AM    BUN 14 11/02/2022 08:28 AM    CREATININE 0.8 11/02/2022 08:28 AM GLUCOSE 86 11/02/2022 08:28 AM    GLUCOSE 83 10/22/2011 10:55 AM    CALCIUM 9.8 11/02/2022 08:28 AM    PROT 7.0 11/02/2022 08:28 AM    PROT 7.1 10/22/2011 10:55 AM    LABALBU 4.5 11/02/2022 08:28 AM    BILITOT 0.7 11/02/2022 08:28 AM    ALKPHOS 61 11/02/2022 08:28 AM    AST 18 11/02/2022 08:28 AM    ALT 17 11/02/2022 08:28 AM    LABGLOM >60 11/02/2022 08:28 AM    GFRAA >60 07/20/2022 08:36 AM    GFRAA 98 10/22/2011 10:55 AM    AGRATIO 1.8 11/02/2022 08:28 AM    GLOB 2.6 05/12/2021 09:08 AM     Lab Results   Component Value Date/Time    TSH 1.40 11/02/2022 08:28 AM    FT3 3.0 11/02/2022 08:28 AM     Lab Results   Component Value Date/Time    LABA1C 5.0 11/24/2021 07:43 AM     Lab Results   Component Value Date/Time    EAG 96.8 11/24/2021 07:43 AM     Lab Results   Component Value Date/Time    CHOL 174 03/25/2022 08:48 AM     Lab Results   Component Value Date/Time    TRIG 36 03/25/2022 08:48 AM     Lab Results   Component Value Date/Time    HDL 59 03/25/2022 08:48 AM    HDL 51 03/14/2012 12:52 PM     Lab Results   Component Value Date/Time    LDLCALC 108 03/25/2022 08:48 AM     Lab Results   Component Value Date/Time    LABVLDL 7 03/25/2022 08:48 AM     No results found for: CHOLHDLRATIO  No results found for: LABMICR, [id number]  Lab Results   Component Value Date/Time    VITD25 66.1 11/02/2022 08:28 AM        ASSESSMENT/PLAN:    1. Hypothyroidism  Anxiety and insomnia worse. Stop Levothyroxine and follow  TSH 4.63-3.42-1.54-0.91-0.90-1.31-1.4  TSH 1.6 on 0.025 mg, TSH-0.69 on 0.05 mg  Follow TSH    Has some anxiety    2. Overweight  Diet, exercise. Tried HCG diet, weight watchers, low carb. On Optavia diet, lost 70 lbs. 3. Multinodular goiter  Follow sonogram with Dr. Casas Reasons. Stable heterogenous MNG with cyto nondiagnostic R inf predominately cystic 1.1cm (prior1.4cm) with R sup solid 1.5cm (prior 1.7cm) and L inf solid hypo 1.1cm without suspicious features.   Bilateral infrathyroid LNs benign appearing likely reactive. 4. Hashimoto's thyroiditis  Follow TSH. 5. Postpartum thyroiditis  Follow TSH. 6. History of Hyperthyroidism  - T3, Free; Future  - T4, Free; Future  - TSH without Reflex; Future    7. IFG   Hemoglobin A1c 5.0  Glucose 102-88-89  Hx of gestational diabetes. Prediabetes education    8. Vitamin D deficiency  25OHVitamin D 49.8-59.8-71.6-73.6-92.3  Decrease vit D [id number] IU daily fpr summer    9. Hyperlipidemia  - lipid panel  - Diet, exercise  -12-  Lipitor 10 mg daily    Reviewed and/or ordered clinical lab results Yes  Reviewed and/or ordered radiology tests Yes   Reviewed and/or ordered other diagnostic tests No  Discussed test results with performing physician No  Independently reviewed image, tracing, or specimen No  Made a decision to obtain old records No  Reviewed and summarized old records Yes  In 2001 she was diagnosed with 1.5 cm cystic nodule. Repeated US - right 1.4 cm, right superior 1.7 cm, left subcm nodules. TSH 7/21/2018 <0.01  TSH 7/2016 2.36  TSH 8/2018 6.12  TSH 10/14/2018 4.63  11/7/2018 TSH 3.42  Obtained history from other than patient No    Kylah Wu was counseled regarding symptoms of thyroid diagnosis, course and complications of disease if inadequately treated, side effects of medications, diagnosis, treatment options, and prognosis, risks, benefits, complications, and alternatives of treatment, labs, imaging and other studies and treatment targets and goals, thyorid nodules, hyperthyroidism causes treatment. .  She understands instructions and counseling. Total time I spent for this encounter 30 minutes      Return in about 3 months (around 2/4/2023) for thyroid problems.

## 2023-02-24 ENCOUNTER — PATIENT MESSAGE (OUTPATIENT)
Dept: FAMILY MEDICINE CLINIC | Age: 38
End: 2023-02-24

## 2023-02-24 DIAGNOSIS — R73.01 IFG (IMPAIRED FASTING GLUCOSE): Primary | ICD-10-CM

## 2023-02-24 DIAGNOSIS — E78.00 ELEVATED LDL CHOLESTEROL LEVEL: Primary | ICD-10-CM

## 2023-02-24 DIAGNOSIS — R03.0 ELEVATED BLOOD PRESSURE READING WITHOUT DIAGNOSIS OF HYPERTENSION: ICD-10-CM

## 2023-02-24 NOTE — TELEPHONE ENCOUNTER
From: Summer Mann  To: Dr. Patti Cline  Sent: 2/24/2023 6:55 AM EST  Subject: Blood work order    Good morning,  I am having a blood draw early next week for my endocrinologist appointment on 3/3 and I know I need some different orders for my yearly physical due in March/April. Would you be able to send over orders for a cholesterol panel and A1C so I can get them all done at the same time? Dr. Doug Dinh already has a metabolic panel ordered along with my thyroid hormone tests.    Thank you in advance,  Valente Lopez

## 2023-02-27 DIAGNOSIS — E55.9 VITAMIN D DEFICIENCY: ICD-10-CM

## 2023-02-27 DIAGNOSIS — E03.9 ACQUIRED HYPOTHYROIDISM: ICD-10-CM

## 2023-02-27 DIAGNOSIS — R73.01 IFG (IMPAIRED FASTING GLUCOSE): ICD-10-CM

## 2023-02-27 DIAGNOSIS — R03.0 ELEVATED BLOOD PRESSURE READING WITHOUT DIAGNOSIS OF HYPERTENSION: ICD-10-CM

## 2023-02-27 DIAGNOSIS — E78.00 ELEVATED LDL CHOLESTEROL LEVEL: ICD-10-CM

## 2023-02-27 LAB
A/G RATIO: 1.8 (ref 1.1–2.2)
ALBUMIN SERPL-MCNC: 4.4 G/DL (ref 3.4–5)
ALP BLD-CCNC: 47 U/L (ref 40–129)
ALT SERPL-CCNC: 14 U/L (ref 10–40)
ANION GAP SERPL CALCULATED.3IONS-SCNC: 11 MMOL/L (ref 3–16)
AST SERPL-CCNC: 17 U/L (ref 15–37)
BASOPHILS ABSOLUTE: 0 K/UL (ref 0–0.2)
BASOPHILS RELATIVE PERCENT: 1.3 %
BILIRUB SERPL-MCNC: 0.3 MG/DL (ref 0–1)
BUN BLDV-MCNC: 12 MG/DL (ref 7–20)
CALCIUM SERPL-MCNC: 9.5 MG/DL (ref 8.3–10.6)
CHLORIDE BLD-SCNC: 101 MMOL/L (ref 99–110)
CHOLESTEROL, TOTAL: 184 MG/DL (ref 0–199)
CO2: 26 MMOL/L (ref 21–32)
CREAT SERPL-MCNC: 0.8 MG/DL (ref 0.6–1.1)
EOSINOPHILS ABSOLUTE: 0.2 K/UL (ref 0–0.6)
EOSINOPHILS RELATIVE PERCENT: 4.6 %
GFR SERPL CREATININE-BSD FRML MDRD: >60 ML/MIN/{1.73_M2}
GLUCOSE FASTING: 96 MG/DL (ref 70–99)
HCT VFR BLD CALC: 41.8 % (ref 36–48)
HDLC SERPL-MCNC: 58 MG/DL (ref 40–60)
HEMOGLOBIN: 14.2 G/DL (ref 12–16)
LDL CHOLESTEROL CALCULATED: 115 MG/DL
LYMPHOCYTES ABSOLUTE: 1.4 K/UL (ref 1–5.1)
LYMPHOCYTES RELATIVE PERCENT: 41.5 %
MCH RBC QN AUTO: 30.8 PG (ref 26–34)
MCHC RBC AUTO-ENTMCNC: 34 G/DL (ref 31–36)
MCV RBC AUTO: 90.5 FL (ref 80–100)
MONOCYTES ABSOLUTE: 0.2 K/UL (ref 0–1.3)
MONOCYTES RELATIVE PERCENT: 6 %
NEUTROPHILS ABSOLUTE: 1.6 K/UL (ref 1.7–7.7)
NEUTROPHILS RELATIVE PERCENT: 46.6 %
PDW BLD-RTO: 13.2 % (ref 12.4–15.4)
PLATELET # BLD: 275 K/UL (ref 135–450)
PMV BLD AUTO: 7.7 FL (ref 5–10.5)
POTASSIUM SERPL-SCNC: 4.3 MMOL/L (ref 3.5–5.1)
RBC # BLD: 4.62 M/UL (ref 4–5.2)
SODIUM BLD-SCNC: 138 MMOL/L (ref 136–145)
T3 FREE: 2.6 PG/ML (ref 2.3–4.2)
T4 FREE: 1 NG/DL (ref 0.9–1.8)
TOTAL PROTEIN: 6.9 G/DL (ref 6.4–8.2)
TRIGL SERPL-MCNC: 53 MG/DL (ref 0–150)
TSH SERPL DL<=0.05 MIU/L-ACNC: 1.09 UIU/ML (ref 0.27–4.2)
VITAMIN D 25-HYDROXY: 72.8 NG/ML
VLDLC SERPL CALC-MCNC: 11 MG/DL
WBC # BLD: 3.5 K/UL (ref 4–11)

## 2023-02-28 LAB
ESTIMATED AVERAGE GLUCOSE: 91.1 MG/DL
HBA1C MFR BLD: 4.8 %

## 2023-03-03 ENCOUNTER — OFFICE VISIT (OUTPATIENT)
Dept: ENDOCRINOLOGY | Age: 38
End: 2023-03-03

## 2023-03-03 VITALS
DIASTOLIC BLOOD PRESSURE: 73 MMHG | TEMPERATURE: 98 F | RESPIRATION RATE: 14 BRPM | WEIGHT: 167 LBS | SYSTOLIC BLOOD PRESSURE: 122 MMHG | BODY MASS INDEX: 26.84 KG/M2 | OXYGEN SATURATION: 96 % | HEIGHT: 66 IN | HEART RATE: 73 BPM

## 2023-03-03 DIAGNOSIS — E04.2 MULTINODULAR GOITER: ICD-10-CM

## 2023-03-03 DIAGNOSIS — E03.9 ACQUIRED HYPOTHYROIDISM: Primary | ICD-10-CM

## 2023-03-03 DIAGNOSIS — Z86.39 HISTORY OF HYPERTHYROIDISM: ICD-10-CM

## 2023-03-03 DIAGNOSIS — R73.01 IFG (IMPAIRED FASTING GLUCOSE): ICD-10-CM

## 2023-03-03 DIAGNOSIS — E55.9 VITAMIN D DEFICIENCY: ICD-10-CM

## 2023-03-03 DIAGNOSIS — E66.3 OVERWEIGHT (BMI 25.0-29.9): ICD-10-CM

## 2023-03-03 DIAGNOSIS — E78.2 MIXED HYPERLIPIDEMIA: ICD-10-CM

## 2023-03-03 DIAGNOSIS — E06.3 HASHIMOTO'S THYROIDITIS: ICD-10-CM

## 2023-03-03 RX ORDER — GINGER ROOT/GINGER ROOT EXT 262.5 MG
1 CAPSULE ORAL DAILY
COMMUNITY

## 2023-03-12 DIAGNOSIS — D70.9 NEUTROPENIA, UNSPECIFIED TYPE (HCC): Primary | ICD-10-CM

## 2023-03-25 ENCOUNTER — E-VISIT (OUTPATIENT)
Dept: PRIMARY CARE CLINIC | Age: 38
End: 2023-03-25
Payer: COMMERCIAL

## 2023-03-25 DIAGNOSIS — L98.9 SKIN LESION: Primary | ICD-10-CM

## 2023-03-25 PROCEDURE — 99422 OL DIG E/M SVC 11-20 MIN: CPT | Performed by: NURSE PRACTITIONER

## 2023-03-25 RX ORDER — CLINDAMYCIN PHOSPHATE 10 MG/G
GEL TOPICAL
Qty: 30 G | Refills: 0 | Status: SHIPPED | OUTPATIENT
Start: 2023-03-25 | End: 2023-04-01

## 2023-03-25 NOTE — PROGRESS NOTES
Krzysztof Luevano (1985) initiated an asynchronous digital communication through [address] [place] [address]. HPI: per patient questionnaire     Exam: not applicable    Diagnoses and all orders for this visit:  Diagnoses and all orders for this visit:    Skin lesion    Other orders  -     clindamycin (CLEOCIN-T) 1 % gel; Apply topically 2 times daily. Reviewed photos. Topical antibiotics sent  S/s to monitor for provided  F/u with pcp       Time: EV2 - 11-20 minutes were spent on the digital evaluation and management of this patient.  13 min     CATY Webb - CNP

## 2023-03-26 RX ORDER — SULFAMETHOXAZOLE AND TRIMETHOPRIM 800; 160 MG/1; MG/1
1 TABLET ORAL 2 TIMES DAILY
Qty: 14 TABLET | Refills: 0 | Status: SHIPPED | OUTPATIENT
Start: 2023-03-26 | End: 2023-04-02

## 2023-05-09 RX ORDER — TRAZODONE HYDROCHLORIDE 50 MG/1
TABLET ORAL
Qty: 90 TABLET | Refills: 0 | Status: SHIPPED | OUTPATIENT
Start: 2023-05-09

## 2023-05-09 NOTE — TELEPHONE ENCOUNTER
Future Appointments   Date Time Provider Andrea Daigle   6/28/2023  4:15 PM Eleazar Benitez DO [email or web address] 6/3/2022

## 2023-05-19 ENCOUNTER — OFFICE VISIT (OUTPATIENT)
Dept: FAMILY MEDICINE CLINIC | Age: 38
End: 2023-05-19
Payer: COMMERCIAL

## 2023-05-19 VITALS
SYSTOLIC BLOOD PRESSURE: 116 MMHG | WEIGHT: 170 LBS | RESPIRATION RATE: 18 BRPM | OXYGEN SATURATION: 99 % | BODY MASS INDEX: 27.44 KG/M2 | DIASTOLIC BLOOD PRESSURE: 72 MMHG | HEART RATE: 59 BPM

## 2023-05-19 DIAGNOSIS — K13.79 MUCOSAL IRRITATION OF ORAL CAVITY: ICD-10-CM

## 2023-05-19 DIAGNOSIS — K05.10 GINGIVITIS: Primary | ICD-10-CM

## 2023-05-19 PROCEDURE — 99213 OFFICE O/P EST LOW 20 MIN: CPT | Performed by: NURSE PRACTITIONER

## 2023-05-19 RX ORDER — CHLORHEXIDINE GLUCONATE 0.12 MG/ML
15 RINSE ORAL 2 TIMES DAILY
Qty: 420 ML | Refills: 0 | Status: SHIPPED | OUTPATIENT
Start: 2023-05-19 | End: 2023-06-02

## 2023-05-19 SDOH — ECONOMIC STABILITY: FOOD INSECURITY: WITHIN THE PAST 12 MONTHS, THE FOOD YOU BOUGHT JUST DIDN'T LAST AND YOU DIDN'T HAVE MONEY TO GET MORE.: NEVER TRUE

## 2023-05-19 SDOH — ECONOMIC STABILITY: FOOD INSECURITY: WITHIN THE PAST 12 MONTHS, YOU WORRIED THAT YOUR FOOD WOULD RUN OUT BEFORE YOU GOT MONEY TO BUY MORE.: NEVER TRUE

## 2023-05-19 SDOH — ECONOMIC STABILITY: INCOME INSECURITY: HOW HARD IS IT FOR YOU TO PAY FOR THE VERY BASICS LIKE FOOD, HOUSING, MEDICAL CARE, AND HEATING?: NOT HARD AT ALL

## 2023-05-19 ASSESSMENT — ENCOUNTER SYMPTOMS
GASTROINTESTINAL NEGATIVE: 1
SORE THROAT: 0
TROUBLE SWALLOWING: 0
RESPIRATORY NEGATIVE: 1

## 2023-05-19 ASSESSMENT — PATIENT HEALTH QUESTIONNAIRE - PHQ9
SUM OF ALL RESPONSES TO PHQ QUESTIONS 1-9: 0
2. FEELING DOWN, DEPRESSED OR HOPELESS: 0
SUM OF ALL RESPONSES TO PHQ QUESTIONS 1-9: 0
SUM OF ALL RESPONSES TO PHQ9 QUESTIONS 1 & 2: 0
1. LITTLE INTEREST OR PLEASURE IN DOING THINGS: 0

## 2023-05-19 NOTE — PROGRESS NOTES
5/19/2023    This is a 34 y.o. female   Chief Complaint   Patient presents with    Oral Swelling     X 2 weeks/ initially felt tingly and numb. More recently, she has noticed inflammation. Salty and acidic foods make it worse   . Maggie Bell seen today for evaluation of oral irritation. Approximately 10 days to 2 weeks ago she started having sensitivity and swelling in her gums. She states that the mouth feels tender. It is worse with eating and drinking. Salty and at times she states her tongue also feels tingly. She has no known food allergies. However she stated she made a cheese cake with monk fruit for her 's birthday prior to the onset of these symptoms. However she has made this kind of cheesecake before. No fevers or chills. No shortness of breath. She is taking Zyrtec daily and that has not made a difference. She tried Tylenol and Benadryl which allowed her to go to sleep but did not change any of her symptoms. Acidic foods make it worse. Patient Active Problem List   Diagnosis    Multinodular goiter    Mixed hyperlipidemia    Vitamin D deficiency    Anxiety    Elevated blood pressure reading without diagnosis of hypertension    Allergic rhinitis    Psychophysiological insomnia    GDM (gestational diabetes mellitus), class A1    History of hyperthyroidism    Class 2 obesity with body mass index (BMI) of 38.0 to 38.9 in adult    Postpartum thyroiditis    Hashimoto's thyroiditis    Acquired hypothyroidism    IFG (impaired fasting glucose)    COVID-19    Hair loss    Class 1 obesity with body mass index (BMI) of 30.0 to 30.9 in adult    Overweight (BMI 25.0-29. 9)       Current Outpatient Medications   Medication Sig Dispense Refill    traZODone (DESYREL) 50 MG tablet TAKE 1 TABLET BY MOUTH EVERY DAY AT BEDTIME AS NEEDED FOR SLEEP 90 tablet 0    calcium carb-cholecalciferol (CALCIUM 600/VITAMIN D3) 600-20 MG-MCG TABS Take 1 tablet by mouth daily      fluticasone (FLONASE) 50 MCG/ACT nasal

## 2023-05-19 NOTE — PATIENT INSTRUCTIONS
Salt water rinses 1/4 tsp salt and 18 tsp baking soda to 8 ounces of water  Rinse 4 times a day  Start peridex twice daily  Dilute mouthwash to 1/4 strength  Change tooth brush   Continue zyrtec

## 2023-06-17 DIAGNOSIS — D70.9 NEUTROPENIA, UNSPECIFIED TYPE (HCC): ICD-10-CM

## 2023-06-17 LAB
BASOPHILS # BLD: 0 K/UL (ref 0–0.2)
BASOPHILS NFR BLD: 0.9 %
DEPRECATED RDW RBC AUTO: 12.7 % (ref 12.4–15.4)
EOSINOPHIL # BLD: 0.2 K/UL (ref 0–0.6)
EOSINOPHIL NFR BLD: 4.4 %
HCT VFR BLD AUTO: 40.1 % (ref 36–48)
HGB BLD-MCNC: 14 G/DL (ref 12–16)
LYMPHOCYTES # BLD: 1.6 K/UL (ref 1–5.1)
LYMPHOCYTES NFR BLD: 42.4 %
MCH RBC QN AUTO: 31.8 PG (ref 26–34)
MCHC RBC AUTO-ENTMCNC: 35 G/DL (ref 31–36)
MCV RBC AUTO: 90.8 FL (ref 80–100)
MONOCYTES # BLD: 0.3 K/UL (ref 0–1.3)
MONOCYTES NFR BLD: 7.3 %
NEUTROPHILS # BLD: 1.7 K/UL (ref 1.7–7.7)
NEUTROPHILS NFR BLD: 45 %
PLATELET # BLD AUTO: 220 K/UL (ref 135–450)
PMV BLD AUTO: 7.4 FL (ref 5–10.5)
RBC # BLD AUTO: 4.41 M/UL (ref 4–5.2)
WBC # BLD AUTO: 3.9 K/UL (ref 4–11)

## 2023-06-20 ENCOUNTER — OFFICE VISIT (OUTPATIENT)
Dept: FAMILY MEDICINE CLINIC | Age: 38
End: 2023-06-20
Payer: COMMERCIAL

## 2023-06-20 VITALS
WEIGHT: 176.4 LBS | DIASTOLIC BLOOD PRESSURE: 84 MMHG | TEMPERATURE: 97.1 F | SYSTOLIC BLOOD PRESSURE: 123 MMHG | RESPIRATION RATE: 16 BRPM | HEIGHT: 66 IN | HEART RATE: 62 BPM | BODY MASS INDEX: 28.35 KG/M2

## 2023-06-20 DIAGNOSIS — Z12.31 ENCOUNTER FOR SCREENING MAMMOGRAM FOR MALIGNANT NEOPLASM OF BREAST: ICD-10-CM

## 2023-06-20 DIAGNOSIS — Z00.00 ANNUAL PHYSICAL EXAM: Primary | ICD-10-CM

## 2023-06-20 DIAGNOSIS — D72.819 LEUKOPENIA, UNSPECIFIED TYPE: ICD-10-CM

## 2023-06-20 DIAGNOSIS — M25.562 ACUTE PAIN OF LEFT KNEE: ICD-10-CM

## 2023-06-20 PROCEDURE — 99396 PREV VISIT EST AGE 40-64: CPT | Performed by: FAMILY MEDICINE

## 2023-06-20 ASSESSMENT — ENCOUNTER SYMPTOMS
SHORTNESS OF BREATH: 0
COLOR CHANGE: 0
BACK PAIN: 0
ABDOMINAL PAIN: 0

## 2023-06-20 ASSESSMENT — PATIENT HEALTH QUESTIONNAIRE - PHQ9
SUM OF ALL RESPONSES TO PHQ QUESTIONS 1-9: 0
SUM OF ALL RESPONSES TO PHQ9 QUESTIONS 1 & 2: 0
1. LITTLE INTEREST OR PLEASURE IN DOING THINGS: 0
SUM OF ALL RESPONSES TO PHQ QUESTIONS 1-9: 0
SUM OF ALL RESPONSES TO PHQ QUESTIONS 1-9: 0
2. FEELING DOWN, DEPRESSED OR HOPELESS: 0
SUM OF ALL RESPONSES TO PHQ QUESTIONS 1-9: 0

## 2023-06-20 NOTE — PROGRESS NOTES
Lisa Espitia  YOB: 1985    Date of Service:  6/20/2023    Chief Complaint:   Lisa Espitia is a 34 y.o. female who presents for complete physical examination. HPI:  HPI  Patient labs from June 17, 2023 reviewed at today's visit, within normal limits except for white blood cell count of 3.9. Hx abnormal PAP: no  Sexual activity: Yes   Self-breast exams: yes  Previous DEXA scan: no  Last eye exam: 3 years ago, normal  Exercise: Walking and Piliates   Seatbelt use: yes  Are You a Spiritual Person: no  Advance Directive: yes    Wt Readings from Last 3 Encounters:   06/20/23 176 lb 6.4 oz (80 kg)   05/19/23 170 lb (77.1 kg)   03/03/23 167 lb (75.8 kg)     BP Readings from Last 3 Encounters:   06/20/23 123/84   05/19/23 116/72   03/03/23 122/73       Patient Active Problem List   Diagnosis    Multinodular goiter    Mixed hyperlipidemia    Vitamin D deficiency    Anxiety    Elevated blood pressure reading without diagnosis of hypertension    Allergic rhinitis    Psychophysiological insomnia    GDM (gestational diabetes mellitus), class A1    History of hyperthyroidism    Class 2 obesity with body mass index (BMI) of 38.0 to 38.9 in adult    Postpartum thyroiditis    Hashimoto's thyroiditis    Acquired hypothyroidism    IFG (impaired fasting glucose)    COVID-19    Hair loss    Class 1 obesity with body mass index (BMI) of 30.0 to 30.9 in adult    Overweight (BMI 25.0-29. 9)       Health Maintenance   Topic Date Due    Varicella vaccine (1 of 2 - 2-dose childhood series) Never done    HIV screen  Never done    Hepatitis C screen  Never done    Cervical cancer screen  Never done    A1C test (Diabetic or Prediabetic)  02/27/2024    Depression Screen  05/19/2024    Lipids  02/27/2028    DTaP/Tdap/Td vaccine (5 - Td or Tdap) 03/01/2028    Flu vaccine  Completed    COVID-19 Vaccine  Completed    Hepatitis A vaccine  Aged Out    Hib vaccine  Aged Out    Meningococcal (ACWY) vaccine  Aged Out    Pneumococcal

## 2023-08-09 RX ORDER — TRAZODONE HYDROCHLORIDE 50 MG/1
TABLET ORAL
Qty: 90 TABLET | Refills: 0 | Status: SHIPPED | OUTPATIENT
Start: 2023-08-09

## 2023-11-03 ENCOUNTER — E-VISIT (OUTPATIENT)
Dept: PRIMARY CARE CLINIC | Age: 38
End: 2023-11-03
Payer: COMMERCIAL

## 2023-11-03 DIAGNOSIS — L30.9 DERMATITIS: Primary | ICD-10-CM

## 2023-11-03 PROCEDURE — 99422 OL DIG E/M SVC 11-20 MIN: CPT | Performed by: NURSE PRACTITIONER

## 2023-11-03 RX ORDER — SULFAMETHOXAZOLE AND TRIMETHOPRIM 800; 160 MG/1; MG/1
1 TABLET ORAL 2 TIMES DAILY
Qty: 14 TABLET | Refills: 0 | Status: SHIPPED | OUTPATIENT
Start: 2023-11-03 | End: 2023-11-10

## 2023-11-03 RX ORDER — TRAZODONE HYDROCHLORIDE 50 MG/1
50 TABLET ORAL DAILY PRN
Qty: 90 TABLET | Refills: 0 | Status: SHIPPED | OUTPATIENT
Start: 2023-11-03

## 2023-11-03 RX ORDER — FLUTICASONE PROPIONATE 50 MCG
SPRAY, SUSPENSION (ML) NASAL
Qty: 16 G | Refills: 1 | Status: SHIPPED | OUTPATIENT
Start: 2023-11-03

## 2023-11-03 NOTE — PROGRESS NOTES
Reviewed questionnaire and photos    Reviewed meds/allergies    Dx Dermatitis    Plan Rx given for bactrim, follow up with PCP if no improvement    Time spent on visit 11 min

## 2023-11-08 ENCOUNTER — OFFICE VISIT (OUTPATIENT)
Dept: FAMILY MEDICINE CLINIC | Age: 38
End: 2023-11-08
Payer: COMMERCIAL

## 2023-11-08 VITALS
HEART RATE: 80 BPM | SYSTOLIC BLOOD PRESSURE: 109 MMHG | TEMPERATURE: 97.1 F | OXYGEN SATURATION: 98 % | BODY MASS INDEX: 30.7 KG/M2 | RESPIRATION RATE: 16 BRPM | DIASTOLIC BLOOD PRESSURE: 74 MMHG | WEIGHT: 190.2 LBS

## 2023-11-08 DIAGNOSIS — L30.9 DERMATITIS: Primary | ICD-10-CM

## 2023-11-08 DIAGNOSIS — G47.9 SLEEPING DIFFICULTY: ICD-10-CM

## 2023-11-08 DIAGNOSIS — L08.9 SKIN INFECTION: ICD-10-CM

## 2023-11-08 PROCEDURE — 99214 OFFICE O/P EST MOD 30 MIN: CPT | Performed by: FAMILY MEDICINE

## 2023-11-08 RX ORDER — CLOTRIMAZOLE 1 %
CREAM (GRAM) TOPICAL 2 TIMES DAILY
COMMUNITY

## 2023-11-08 RX ORDER — PRENATAL VIT 91/IRON/FOLIC/DHA 28-975-200
COMBINATION PACKAGE (EA) ORAL
COMMUNITY
Start: 2023-11-06 | End: 2023-11-08

## 2023-11-08 RX ORDER — TRIAMCINOLONE ACETONIDE 1 MG/ML
LOTION TOPICAL
Qty: 1 EACH | Refills: 0 | Status: SHIPPED | OUTPATIENT
Start: 2023-11-08

## 2023-11-08 RX ORDER — METHYLPREDNISOLONE 4 MG/1
TABLET ORAL
Qty: 1 KIT | Refills: 0 | Status: SHIPPED | OUTPATIENT
Start: 2023-11-08 | End: 2023-11-14

## 2023-11-08 ASSESSMENT — PATIENT HEALTH QUESTIONNAIRE - PHQ9
SUM OF ALL RESPONSES TO PHQ QUESTIONS 1-9: 0
SUM OF ALL RESPONSES TO PHQ9 QUESTIONS 1 & 2: 0
1. LITTLE INTEREST OR PLEASURE IN DOING THINGS: 0
SUM OF ALL RESPONSES TO PHQ QUESTIONS 1-9: 0
2. FEELING DOWN, DEPRESSED OR HOPELESS: 0
SUM OF ALL RESPONSES TO PHQ QUESTIONS 1-9: 0
SUM OF ALL RESPONSES TO PHQ QUESTIONS 1-9: 0

## 2023-11-08 NOTE — PROGRESS NOTES
11/8/2023    This is a 34 y.o. female   Chief Complaint   Patient presents with    Rash     Rash on torso and arms, e-visit 11/3/23 using bactrim, [facility] 11/6/23 said could be fungal    .    HPI  Pt presents today for the following:    Rash: Located on torso and arms, Sx began 6 days ago on the left wrist where she wears her fit bit,  within 24 hours noticed on right wrist, teaches microbiology  had an E-visit on November 3, 2023 and was prescribed Bactrim x 7 days. Developed a yeat infection, using OTC Monostat started yesterday. , used a Tagederm strip over iright wrist, went to [facility] 2 days ago and told it was fungal. Using Tinea cruris medication but not helping. Sleep Difficulty: Taking Trazodone 50 mg daily prn. Also admits to 6 hx of itching around her anus.   Past Medical History:   Diagnosis Date    Acquired hypothyroidism 8/26/2018    Allergic rhinitis     Goiter, non-toxic     Hypertension     Hyperthyroidism 8/20/2018       Orders Only on 06/17/2023   Component Date Value Ref Range Status    WBC 06/17/2023 3.9 (L)  4.0 - 11.0 K/uL Final    RBC 06/17/2023 4.41  4.00 - 5.20 M/uL Final    Hemoglobin 06/17/2023 14.0  12.0 - 16.0 g/dL Final    Hematocrit 06/17/2023 40.1  36.0 - 48.0 % Final    MCV 06/17/2023 90.8  80.0 - 100.0 fL Final    MCH 06/17/2023 31.8  26.0 - 34.0 pg Final    MCHC 06/17/2023 35.0  31.0 - 36.0 g/dL Final    RDW 06/17/2023 12.7  12.4 - 15.4 % Final    Platelets 45/29/5420 220  135 - 450 K/uL Final    MPV 06/17/2023 7.4  5.0 - 10.5 fL Final    Neutrophils % 06/17/2023 45.0  % Final    Lymphocytes % 06/17/2023 42.4  % Final    Monocytes % 06/17/2023 7.3  % Final    Eosinophils % 06/17/2023 4.4  % Final    Basophils % 06/17/2023 0.9  % Final    Neutrophils Absolute 06/17/2023 1.7  1.7 - 7.7 K/uL Final    Lymphocytes Absolute 06/17/2023 1.6  1.0 - 5.1 K/uL Final    Monocytes Absolute 06/17/2023 0.3  0.0 - 1.3 K/uL Final    Eosinophils Absolute 06/17/2023 0.2  0.0 - 0.6

## 2024-02-02 RX ORDER — TRAZODONE HYDROCHLORIDE 50 MG/1
50 TABLET ORAL DAILY PRN
Qty: 90 TABLET | Refills: 0 | Status: SHIPPED | OUTPATIENT
Start: 2024-02-02

## 2024-04-26 RX ORDER — TRAZODONE HYDROCHLORIDE 50 MG/1
TABLET ORAL
Qty: 90 TABLET | Refills: 0 | Status: SHIPPED | OUTPATIENT
Start: 2024-04-26

## 2024-05-20 ENCOUNTER — OFFICE VISIT (OUTPATIENT)
Dept: URGENT CARE | Age: 39
End: 2024-05-20

## 2024-05-20 VITALS
HEIGHT: 66 IN | OXYGEN SATURATION: 98 % | BODY MASS INDEX: 33.91 KG/M2 | DIASTOLIC BLOOD PRESSURE: 74 MMHG | WEIGHT: 211 LBS | HEART RATE: 78 BPM | SYSTOLIC BLOOD PRESSURE: 111 MMHG | TEMPERATURE: 98.5 F

## 2024-05-20 DIAGNOSIS — B02.9 HERPES ZOSTER WITHOUT COMPLICATION: Primary | ICD-10-CM

## 2024-05-20 RX ORDER — VALACYCLOVIR HYDROCHLORIDE 1 G/1
1000 TABLET, FILM COATED ORAL 3 TIMES DAILY
Qty: 21 TABLET | Refills: 0 | Status: SHIPPED | OUTPATIENT
Start: 2024-05-20 | End: 2024-05-27

## 2024-05-20 NOTE — PATIENT INSTRUCTIONS
Take valtrex as directed for treatment of Shingles.  Continue Ibuprofen or Tylenol for pain management.  If pain becomes more significant, you can take ibuprofen, up to 600 mg four times per day every day with food for the next 3-5 days, then as needed and directed on the bottle for continued pain.You may alternate this with up to 1000 mg of acetaminophen (Tylenol), every six hours. Do not take more than 4000 mg of acetaminophen from all sources in a 24-hour period.   Watch for signs of infection (such as swelling, increased tenderness, discharge, purulence, spreading red rash, heat), and return to the clinic should any develop.  Keep any discharge from contacting anyone who may not have had previous chicken pox, the Varicella vaccine, or who are pregnant, as they are at higher risk for developing illness from your rash.    Recommend following up with your PCP in 5 days for any persistent or worsening symptoms.    New Prescriptions    VALACYCLOVIR (VALTREX) 1 G TABLET    Take 1 tablet by mouth 3 times daily for 7 days

## 2024-05-20 NOTE — PROGRESS NOTES
Tana Mckeon (: 1985) is a 39 y.o. female, New patient, here for evaluation of the following chief complaint(s):  Pain (Pain developed on the L side of her scalp on Thursday, worse with pressure or movement. Reports having swelling on side. Pain radiates from ear into the L side of head and then back into the back of her ear. )      ASSESSMENT/PLAN:    ICD-10-CM    1. Herpes zoster without complication  B02.9 valACYclovir (VALTREX) 1 g tablet          Given presence of small, vesicular rash with tightly packed, small, clear vesicles, with surrounding tenderness and itch, all unilateral and within the same dermatome (C2), no history of skin injuries, traumas, or warm erythematous rash on exam, and pt noting past history of chicken pox as a child, concern for outbreak of herpes zoster.  Low concern for cellulitis, abscess, tinea, SJS, folliculitis, eczema, contact dermatitis, or sun burn.  Valtrex prescription provided due to rash outbreak for <72 hours.   Provided instruction for pain medication regimen using OTC pain medications  Monitor for signs of infection and return to clinic or f/u with PCP for concerns.   Discussed potential for exposure of others to the discharge from the vesicles as being contagious.     Discussed PCP follow up for persisting or worsening symptoms, or to return to the clinic if unable to obtain PCP follow up for worsening symptoms.    The patient tolerated their visit well. The patient and/or the family were informed of the results of any tests, a time was given to answer questions, a plan was proposed and they agreed with plan. Reviewed AVS with treatment instructions and answered questions - pt/family expresses understanding and agreement with the discussed treatment plan and AVS instructions.      SUBJECTIVE/OBJECTIVE:  HPI:   39 y.o. female presents for complaint of left sided scalp pain x 4 days.    Notes left sided scalp pain that hurts with pressure or movement. Notes

## 2024-07-25 RX ORDER — TRAZODONE HYDROCHLORIDE 50 MG/1
TABLET ORAL
Qty: 90 TABLET | Refills: 0 | Status: SHIPPED | OUTPATIENT
Start: 2024-07-25

## 2024-07-25 NOTE — TELEPHONE ENCOUNTER
6/20/23    Future Appointments   Date Time Provider Department Center   9/5/2024  3:40 PM Quang Good, DO [facility]

## 2024-08-17 ENCOUNTER — OFFICE VISIT (OUTPATIENT)
Dept: URGENT CARE | Age: 39
End: 2024-08-17

## 2024-08-17 VITALS
HEART RATE: 73 BPM | OXYGEN SATURATION: 98 % | TEMPERATURE: 98.4 F | WEIGHT: 209.8 LBS | HEIGHT: 66 IN | SYSTOLIC BLOOD PRESSURE: 112 MMHG | BODY MASS INDEX: 33.72 KG/M2 | DIASTOLIC BLOOD PRESSURE: 76 MMHG

## 2024-08-17 DIAGNOSIS — K04.7 DENTAL INFECTION: Primary | ICD-10-CM

## 2024-08-17 RX ORDER — AMOXICILLIN AND CLAVULANATE POTASSIUM 875; 125 MG/1; MG/1
1 TABLET, FILM COATED ORAL 2 TIMES DAILY
Qty: 20 TABLET | Refills: 0 | Status: SHIPPED | OUTPATIENT
Start: 2024-08-17 | End: 2024-08-27

## 2024-08-17 RX ORDER — MULTIVIT-MIN/IRON/FOLIC ACID/K 18-600-40
CAPSULE ORAL
COMMUNITY

## 2024-08-17 NOTE — PATIENT INSTRUCTIONS
Augmentin is prescribed for antibiotic treatment of the dental infection  Take the antibiotics until completed, do not stop unless otherwise directed by a healthcare provider.  Recommend daily yogurt or other probiotics while on antibiotics.  Take ibuprofen, up to 600 mg four times per day every day with food for the next 3-5 days, then as needed and directed on the bottle for continued pain.You may alternate this with up to 500 mg of acetaminophen (Extra-Strength Tylenol), every six hours. Do not take more than 4000 mg of acetaminophen from all sources in a 24-hour period.  Salt water gargles, especially after eating to keep food debris out of the tooth  Hot compresses over the area of jaw pain/swelling for 15-20 minutes several times per day  Continued brushing your teeth to keep the area of clean of any food debris.  MUST follow up with a dentist to address the underlying dental pathology to prevent the return of the infection.  If you develop worsening swelling, worsening pain, fevers, chills, nausea and vomiting, shortness of breath, or chest pain - follow up immediately with the ER for further evaluation.    New Prescriptions    No medications on file

## 2024-08-17 NOTE — PROGRESS NOTES
Trachea: Trachea and phonation normal.   Cardiovascular:      Rate and Rhythm: Normal rate and regular rhythm.      Heart sounds: Normal heart sounds.   Pulmonary:      Effort: Pulmonary effort is normal.      Breath sounds: Normal breath sounds.   Musculoskeletal:      Cervical back: Full passive range of motion without pain, normal range of motion and neck supple.   Lymphadenopathy:      Cervical: No cervical adenopathy.   Skin:     General: Skin is warm and dry.   Neurological:      Mental Status: She is alert and oriented to person, place, and time.   Psychiatric:         Attention and Perception: Attention normal.         Behavior: Behavior is cooperative.       PROCEDURES:  Unless otherwise noted below, none     Procedures    RESULTS:  No results found for this visit on 08/17/24.  An electronic signature was used to authenticate this note.    --Patrick Gama PA-C

## 2024-08-22 DIAGNOSIS — D72.819 LEUKOPENIA, UNSPECIFIED TYPE: Primary | ICD-10-CM

## 2024-08-22 DIAGNOSIS — E78.2 MIXED HYPERLIPIDEMIA: ICD-10-CM

## 2024-08-22 DIAGNOSIS — E55.9 VITAMIN D DEFICIENCY: ICD-10-CM

## 2024-08-22 DIAGNOSIS — E06.3 HASHIMOTO'S THYROIDITIS: ICD-10-CM

## 2024-08-22 DIAGNOSIS — Z86.39 HISTORY OF HYPERTHYROIDISM: ICD-10-CM

## 2024-09-03 DIAGNOSIS — E06.3 HASHIMOTO'S THYROIDITIS: ICD-10-CM

## 2024-09-03 DIAGNOSIS — E78.2 MIXED HYPERLIPIDEMIA: ICD-10-CM

## 2024-09-03 DIAGNOSIS — E55.9 VITAMIN D DEFICIENCY: ICD-10-CM

## 2024-09-03 DIAGNOSIS — D72.819 LEUKOPENIA, UNSPECIFIED TYPE: ICD-10-CM

## 2024-09-03 LAB
25(OH)D3 SERPL-MCNC: 56.9 NG/ML
ALBUMIN SERPL-MCNC: 4.4 G/DL (ref 3.4–5)
ALBUMIN/GLOB SERPL: 1.8 {RATIO} (ref 1.1–2.2)
ALP SERPL-CCNC: 58 U/L (ref 40–129)
ALT SERPL-CCNC: 15 U/L (ref 10–40)
ANION GAP SERPL CALCULATED.3IONS-SCNC: 10 MMOL/L (ref 3–16)
AST SERPL-CCNC: 22 U/L (ref 15–37)
BASOPHILS # BLD: 0 K/UL (ref 0–0.2)
BASOPHILS NFR BLD: 0.9 %
BILIRUB SERPL-MCNC: 0.6 MG/DL (ref 0–1)
BUN SERPL-MCNC: 12 MG/DL (ref 7–20)
CALCIUM SERPL-MCNC: 9.8 MG/DL (ref 8.3–10.6)
CHLORIDE SERPL-SCNC: 101 MMOL/L (ref 99–110)
CHOLEST SERPL-MCNC: 222 MG/DL (ref 0–199)
CO2 SERPL-SCNC: 26 MMOL/L (ref 21–32)
CREAT SERPL-MCNC: 0.9 MG/DL (ref 0.6–1.1)
DEPRECATED RDW RBC AUTO: 12.9 % (ref 12.4–15.4)
EOSINOPHIL # BLD: 0.2 K/UL (ref 0–0.6)
EOSINOPHIL NFR BLD: 3.3 %
EST. AVERAGE GLUCOSE BLD GHB EST-MCNC: 96.8 MG/DL
GFR SERPLBLD CREATININE-BSD FMLA CKD-EPI: 82 ML/MIN/{1.73_M2}
GLUCOSE SERPL-MCNC: 93 MG/DL (ref 70–99)
HBA1C MFR BLD: 5 %
HCT VFR BLD AUTO: 41 % (ref 36–48)
HDLC SERPL-MCNC: 54 MG/DL (ref 40–60)
HGB BLD-MCNC: 13.9 G/DL (ref 12–16)
LDLC SERPL CALC-MCNC: 154 MG/DL
LYMPHOCYTES # BLD: 1.6 K/UL (ref 1–5.1)
LYMPHOCYTES NFR BLD: 29.8 %
MAGNESIUM SERPL-MCNC: 2.06 MG/DL (ref 1.8–2.4)
MCH RBC QN AUTO: 31 PG (ref 26–34)
MCHC RBC AUTO-ENTMCNC: 34 G/DL (ref 31–36)
MCV RBC AUTO: 91.1 FL (ref 80–100)
MONOCYTES # BLD: 0.3 K/UL (ref 0–1.3)
MONOCYTES NFR BLD: 6.4 %
NEUTROPHILS # BLD: 3.3 K/UL (ref 1.7–7.7)
NEUTROPHILS NFR BLD: 59.6 %
PLATELET # BLD AUTO: 260 K/UL (ref 135–450)
PMV BLD AUTO: 7.7 FL (ref 5–10.5)
POTASSIUM SERPL-SCNC: 4 MMOL/L (ref 3.5–5.1)
PROT SERPL-MCNC: 6.8 G/DL (ref 6.4–8.2)
RBC # BLD AUTO: 4.5 M/UL (ref 4–5.2)
SODIUM SERPL-SCNC: 137 MMOL/L (ref 136–145)
T3FREE SERPL-MCNC: 2.9 PG/ML (ref 2.3–4.2)
T4 FREE SERPL-MCNC: 1.1 NG/DL (ref 0.9–1.8)
TRIGL SERPL-MCNC: 69 MG/DL (ref 0–150)
TSH SERPL DL<=0.005 MIU/L-ACNC: 1.13 UIU/ML (ref 0.27–4.2)
VLDLC SERPL CALC-MCNC: 14 MG/DL
WBC # BLD AUTO: 5.5 K/UL (ref 4–11)

## 2024-09-05 ENCOUNTER — OFFICE VISIT (OUTPATIENT)
Dept: FAMILY MEDICINE CLINIC | Age: 39
End: 2024-09-05
Payer: COMMERCIAL

## 2024-09-05 VITALS
SYSTOLIC BLOOD PRESSURE: 115 MMHG | HEART RATE: 70 BPM | RESPIRATION RATE: 16 BRPM | BODY MASS INDEX: 33.27 KG/M2 | DIASTOLIC BLOOD PRESSURE: 79 MMHG | OXYGEN SATURATION: 97 % | TEMPERATURE: 97.8 F | HEIGHT: 66 IN | WEIGHT: 207 LBS

## 2024-09-05 DIAGNOSIS — E78.00 ELEVATED LDL CHOLESTEROL LEVEL: ICD-10-CM

## 2024-09-05 DIAGNOSIS — Z00.00 ANNUAL PHYSICAL EXAM: Primary | ICD-10-CM

## 2024-09-05 PROCEDURE — 99396 PREV VISIT EST AGE 40-64: CPT | Performed by: FAMILY MEDICINE

## 2024-09-05 SDOH — ECONOMIC STABILITY: FOOD INSECURITY: WITHIN THE PAST 12 MONTHS, THE FOOD YOU BOUGHT JUST DIDN'T LAST AND YOU DIDN'T HAVE MONEY TO GET MORE.: NEVER TRUE

## 2024-09-05 SDOH — ECONOMIC STABILITY: FOOD INSECURITY: WITHIN THE PAST 12 MONTHS, YOU WORRIED THAT YOUR FOOD WOULD RUN OUT BEFORE YOU GOT MONEY TO BUY MORE.: NEVER TRUE

## 2024-09-05 SDOH — ECONOMIC STABILITY: INCOME INSECURITY: HOW HARD IS IT FOR YOU TO PAY FOR THE VERY BASICS LIKE FOOD, HOUSING, MEDICAL CARE, AND HEATING?: NOT HARD AT ALL

## 2024-09-05 ASSESSMENT — PATIENT HEALTH QUESTIONNAIRE - PHQ9
SUM OF ALL RESPONSES TO PHQ QUESTIONS 1-9: 0
SUM OF ALL RESPONSES TO PHQ9 QUESTIONS 1 & 2: 0
SUM OF ALL RESPONSES TO PHQ9 QUESTIONS 1 & 2: 0
SUM OF ALL RESPONSES TO PHQ QUESTIONS 1-9: 0
2. FEELING DOWN, DEPRESSED OR HOPELESS: NOT AT ALL
SUM OF ALL RESPONSES TO PHQ QUESTIONS 1-9: 0
2. FEELING DOWN, DEPRESSED OR HOPELESS: NOT AT ALL
1. LITTLE INTEREST OR PLEASURE IN DOING THINGS: NOT AT ALL
1. LITTLE INTEREST OR PLEASURE IN DOING THINGS: NOT AT ALL
SUM OF ALL RESPONSES TO PHQ QUESTIONS 1-9: 0

## 2024-09-05 ASSESSMENT — ENCOUNTER SYMPTOMS
COLOR CHANGE: 0
BACK PAIN: 0
EYE ITCHING: 1
ABDOMINAL PAIN: 0
SHORTNESS OF BREATH: 0

## 2024-09-05 NOTE — PROGRESS NOTES
Tana Mckeon  YOB: 1985    Date of Service:  9/5/2024    Chief Complaint:   Tana Mckeon is a 39 y.o. female who presents for complete physical examination.    HPI:  HPI  Labs from 09/03/2024 reviewed at today's visit, within normal limits except for total cholesterol 222 and .    The 10-year ASCVD risk score (Orion ALONSO, et al., 2019) is: 0.6%    Values used to calculate the score:      Age: 39 years      Sex: Female      Is Non- : No      Diabetic: No      Tobacco smoker: No      Systolic Blood Pressure: 115 mmHg      Is BP treated: No      HDL Cholesterol: 54 mg/dL      Total Cholesterol: 222 mg/dL    Hx abnormal PAP: no  Sexual activity: Yes   Self-breast exams: yes  Previous DEXA scan: no  Last eye exam: spring 2024, normal  Exercise: Was doing water aerobic over the summer, riding bike in the morning, takes stairs at work.  Seatbelt use: Yes  Are You a Spiritual Person: No  Advance Directive: Unsure    Wt Readings from Last 3 Encounters:   09/05/24 93.9 kg (207 lb)   08/17/24 95.2 kg (209 lb 12.8 oz)   05/20/24 95.7 kg (211 lb)     BP Readings from Last 3 Encounters:   09/05/24 115/79   08/17/24 112/76   05/20/24 111/74       Patient Active Problem List   Diagnosis    Multinodular goiter    Mixed hyperlipidemia    Vitamin D deficiency    Anxiety    Elevated blood pressure reading without diagnosis of hypertension    Allergic rhinitis    Psychophysiological insomnia    GDM (gestational diabetes mellitus), class A1    History of hyperthyroidism    Class 2 obesity with body mass index (BMI) of 38.0 to 38.9 in adult    Postpartum thyroiditis    Hashimoto's thyroiditis    Acquired hypothyroidism    IFG (impaired fasting glucose)    COVID-19    Hair loss    Class 1 obesity with body mass index (BMI) of 30.0 to 30.9 in adult    Overweight (BMI 25.0-29.9)       Health Maintenance   Topic Date Due    Varicella vaccine (1 of 2 - 13+ 2-dose series) Never done    HIV screen

## 2024-09-27 ENCOUNTER — E-VISIT (OUTPATIENT)
Dept: PRIMARY CARE CLINIC | Age: 39
End: 2024-09-27
Payer: COMMERCIAL

## 2024-09-27 DIAGNOSIS — L98.9 SKIN LESION: Primary | ICD-10-CM

## 2024-09-27 PROCEDURE — 99423 OL DIG E/M SVC 21+ MIN: CPT | Performed by: NURSE PRACTITIONER

## 2024-09-27 RX ORDER — MUPIROCIN 20 MG/G
OINTMENT TOPICAL
Qty: 15 G | Refills: 0 | Status: SHIPPED | OUTPATIENT
Start: 2024-09-27 | End: 2024-10-04

## 2024-10-21 RX ORDER — TRAZODONE HYDROCHLORIDE 50 MG/1
TABLET, FILM COATED ORAL
Qty: 90 TABLET | Refills: 1 | Status: SHIPPED | OUTPATIENT
Start: 2024-10-21

## 2024-11-03 ENCOUNTER — E-VISIT (OUTPATIENT)
Dept: PRIMARY CARE CLINIC | Age: 39
End: 2024-11-03
Payer: COMMERCIAL

## 2024-11-03 DIAGNOSIS — B36.9 FUNGAL DERMATITIS: Primary | ICD-10-CM

## 2024-11-03 PROCEDURE — 99422 OL DIG E/M SVC 11-20 MIN: CPT | Performed by: NURSE PRACTITIONER

## 2024-11-03 RX ORDER — CLOTRIMAZOLE AND BETAMETHASONE DIPROPIONATE 10; .64 MG/G; MG/G
CREAM TOPICAL
Qty: 45 G | Refills: 0 | Status: SHIPPED | OUTPATIENT
Start: 2024-11-03

## 2024-11-03 NOTE — PROGRESS NOTES
Reviewed questionnaire and photos    Reviewed meds/allergies    Dx Fungal dermatitis    Plan Rx given for lotrisone, follow up with PCP if no improvement    Time spent on visit 11 min

## 2024-11-06 ENCOUNTER — OFFICE VISIT (OUTPATIENT)
Dept: URGENT CARE | Age: 39
End: 2024-11-06

## 2024-11-06 VITALS
DIASTOLIC BLOOD PRESSURE: 84 MMHG | TEMPERATURE: 99 F | SYSTOLIC BLOOD PRESSURE: 122 MMHG | OXYGEN SATURATION: 98 % | HEART RATE: 93 BPM | BODY MASS INDEX: 34.39 KG/M2 | HEIGHT: 66 IN | WEIGHT: 214 LBS

## 2024-11-06 DIAGNOSIS — L08.9 SKIN INFECTION: Primary | ICD-10-CM

## 2024-11-06 RX ORDER — SULFAMETHOXAZOLE AND TRIMETHOPRIM 800; 160 MG/1; MG/1
1 TABLET ORAL 2 TIMES DAILY
Qty: 14 TABLET | Refills: 0 | Status: SHIPPED | OUTPATIENT
Start: 2024-11-06 | End: 2024-11-13

## 2024-11-06 ASSESSMENT — ENCOUNTER SYMPTOMS
DIARRHEA: 0
EYE PAIN: 0
SORE THROAT: 0
COUGH: 0
VOMITING: 0
SHORTNESS OF BREATH: 0
NAUSEA: 0
ABDOMINAL PAIN: 0

## 2024-11-06 NOTE — PROGRESS NOTES
betamethasone.     Denies fever, body aches, chills, trouble breathing or trouble swallowing.     Has tried using mupirocin and clotrimazole with betamethasone on the rash on her axillas but they do not seem to be helping. Pt states she has a hx in the past of staph infections.         History provided by:  Patient   used: No        VITAL SIGNS  Vitals:    11/06/24 0822 11/06/24 0840   BP: 128/86 122/84   Pulse: 93    Temp: 99 °F (37.2 °C)    TempSrc: Oral    SpO2: 98%    Weight: 97.1 kg (214 lb)    Height: 1.676 m (5' 6\")        Review of Systems   Constitutional:  Negative for chills, fatigue and fever.   HENT:  Negative for congestion and sore throat.    Eyes:  Negative for pain and visual disturbance.   Respiratory:  Negative for cough and shortness of breath.    Cardiovascular:  Negative for chest pain.   Gastrointestinal:  Negative for abdominal pain, diarrhea, nausea and vomiting.   Skin:  Positive for rash.   Neurological:  Negative for dizziness and headaches.   Psychiatric/Behavioral:  Negative for confusion.      See HPI for pertinent positives and negatives.    Physical Exam  Vitals and nursing note reviewed.   Constitutional:       General: She is not in acute distress.     Appearance: Normal appearance. She is not ill-appearing, toxic-appearing or diaphoretic.      Interventions: She is not intubated.  HENT:      Head: Normocephalic and atraumatic.      Right Ear: External ear normal.      Left Ear: External ear normal.      Nose: Nose normal.      Mouth/Throat:      Mouth: Mucous membranes are moist.   Eyes:      Conjunctiva/sclera: Conjunctivae normal.      Pupils: Pupils are equal, round, and reactive to light.   Cardiovascular:      Rate and Rhythm: Normal rate and regular rhythm.      Pulses: Normal pulses.      Heart sounds: Normal heart sounds.   Pulmonary:      Effort: Pulmonary effort is normal. No tachypnea, bradypnea, accessory muscle usage, prolonged expiration,

## 2025-01-27 ENCOUNTER — OFFICE VISIT (OUTPATIENT)
Dept: FAMILY MEDICINE CLINIC | Age: 40
End: 2025-01-27

## 2025-01-27 VITALS
DIASTOLIC BLOOD PRESSURE: 62 MMHG | WEIGHT: 218.8 LBS | BODY MASS INDEX: 35.32 KG/M2 | RESPIRATION RATE: 16 BRPM | HEART RATE: 77 BPM | OXYGEN SATURATION: 99 % | SYSTOLIC BLOOD PRESSURE: 108 MMHG | TEMPERATURE: 97.8 F

## 2025-01-27 DIAGNOSIS — J06.9 VIRAL URI: Primary | ICD-10-CM

## 2025-01-27 DIAGNOSIS — R05.9 COUGH, UNSPECIFIED TYPE: ICD-10-CM

## 2025-01-27 DIAGNOSIS — E66.812 CLASS 2 SEVERE OBESITY WITH SERIOUS COMORBIDITY AND BODY MASS INDEX (BMI) OF 35.0 TO 35.9 IN ADULT, UNSPECIFIED OBESITY TYPE: Primary | ICD-10-CM

## 2025-01-27 DIAGNOSIS — E66.01 CLASS 2 SEVERE OBESITY WITH SERIOUS COMORBIDITY AND BODY MASS INDEX (BMI) OF 35.0 TO 35.9 IN ADULT, UNSPECIFIED OBESITY TYPE: Primary | ICD-10-CM

## 2025-01-27 LAB
INFLUENZA A ANTIGEN, POC: NEGATIVE
INFLUENZA B ANTIGEN, POC: NEGATIVE
LOT EXPIRE DATE: NORMAL
LOT KIT NUMBER: NORMAL
SARS-COV-2, POC: NORMAL
VALID INTERNAL CONTROL: 0
VENDOR AND KIT NAME POC: NORMAL

## 2025-01-27 RX ORDER — ALBUTEROL SULFATE 90 UG/1
2 INHALANT RESPIRATORY (INHALATION) EVERY 6 HOURS PRN
Qty: 18 G | Refills: 0 | Status: SHIPPED | OUTPATIENT
Start: 2025-01-27

## 2025-01-27 RX ORDER — DEXTROMETHORPHAN HYDROBROMIDE AND PROMETHAZINE HYDROCHLORIDE 15; 6.25 MG/5ML; MG/5ML
5 SYRUP ORAL 4 TIMES DAILY PRN
Qty: 240 ML | Refills: 0 | Status: SHIPPED | OUTPATIENT
Start: 2025-01-27

## 2025-01-27 SDOH — ECONOMIC STABILITY: FOOD INSECURITY: WITHIN THE PAST 12 MONTHS, YOU WORRIED THAT YOUR FOOD WOULD RUN OUT BEFORE YOU GOT MONEY TO BUY MORE.: NEVER TRUE

## 2025-01-27 SDOH — ECONOMIC STABILITY: FOOD INSECURITY: WITHIN THE PAST 12 MONTHS, THE FOOD YOU BOUGHT JUST DIDN'T LAST AND YOU DIDN'T HAVE MONEY TO GET MORE.: NEVER TRUE

## 2025-01-27 ASSESSMENT — ENCOUNTER SYMPTOMS
SORE THROAT: 1
CHEST TIGHTNESS: 0
VOMITING: 0
COUGH: 1
ABDOMINAL PAIN: 0
DIARRHEA: 0
RHINORRHEA: 1
WHEEZING: 0
VOICE CHANGE: 1
SINUS PAIN: 0
NAUSEA: 0
SWOLLEN GLANDS: 0

## 2025-01-27 ASSESSMENT — PATIENT HEALTH QUESTIONNAIRE - PHQ9
2. FEELING DOWN, DEPRESSED OR HOPELESS: NOT AT ALL
1. LITTLE INTEREST OR PLEASURE IN DOING THINGS: NOT AT ALL
SUM OF ALL RESPONSES TO PHQ QUESTIONS 1-9: 0
SUM OF ALL RESPONSES TO PHQ QUESTIONS 1-9: 0
SUM OF ALL RESPONSES TO PHQ9 QUESTIONS 1 & 2: 0
SUM OF ALL RESPONSES TO PHQ QUESTIONS 1-9: 0
SUM OF ALL RESPONSES TO PHQ QUESTIONS 1-9: 0

## 2025-01-27 ASSESSMENT — ANXIETY QUESTIONNAIRES
IF YOU CHECKED OFF ANY PROBLEMS ON THIS QUESTIONNAIRE, HOW DIFFICULT HAVE THESE PROBLEMS MADE IT FOR YOU TO DO YOUR WORK, TAKE CARE OF THINGS AT HOME, OR GET ALONG WITH OTHER PEOPLE: NOT DIFFICULT AT ALL
5. BEING SO RESTLESS THAT IT IS HARD TO SIT STILL: NOT AT ALL
1. FEELING NERVOUS, ANXIOUS, OR ON EDGE: NOT AT ALL
2. NOT BEING ABLE TO STOP OR CONTROL WORRYING: NOT AT ALL
GAD7 TOTAL SCORE: 0
6. BECOMING EASILY ANNOYED OR IRRITABLE: NOT AT ALL
4. TROUBLE RELAXING: NOT AT ALL
3. WORRYING TOO MUCH ABOUT DIFFERENT THINGS: NOT AT ALL
7. FEELING AFRAID AS IF SOMETHING AWFUL MIGHT HAPPEN: NOT AT ALL

## 2025-01-27 NOTE — PROGRESS NOTES
1/27/2025    This is a 39 y.o. female   Chief Complaint   Patient presents with    Cough     Chest congestion, neck and shoulder pain, SOB, nausea, has a little phlegm    .    Tana is seen today for evaluation of URI symptoms. Symptoms started yesterday morning and has been worsening    Cold Symptoms   This is a new problem. The current episode started yesterday. The problem has been gradually worsening. There has been no fever. Associated symptoms include coughing, rhinorrhea and a sore throat. Pertinent negatives include no abdominal pain, chest pain, congestion, diarrhea, dysuria, ear pain, headaches, joint pain, joint swelling, nausea, neck pain, plugged ear sensation, rash, sinus pain, sneezing, swollen glands, vomiting or wheezing. She has tried decongestant and antihistamine for the symptoms. The treatment provided mild relief.        Patient Active Problem List   Diagnosis    Multinodular goiter    Mixed hyperlipidemia    Vitamin D deficiency    Anxiety    Elevated blood pressure reading without diagnosis of hypertension    Allergic rhinitis    Psychophysiological insomnia    GDM (gestational diabetes mellitus), class A1    History of hyperthyroidism    Class 2 obesity with body mass index (BMI) of 38.0 to 38.9 in adult    Postpartum thyroiditis    Hashimoto's thyroiditis    Acquired hypothyroidism    IFG (impaired fasting glucose)    COVID-19    Hair loss    Class 1 obesity with body mass index (BMI) of 30.0 to 30.9 in adult    Overweight (BMI 25.0-29.9)       Current Outpatient Medications   Medication Sig Dispense Refill    clotrimazole-betamethasone (LOTRISONE) 1-0.05 % cream Apply externally bid x 3 days then daily x 4 days. (Patient not taking: Reported on 11/6/2024) 45 g 0    traZODone (DESYREL) 50 MG tablet TAKE 1 TABLET BY MOUTH EVERY DAY AS NEEDED FOR SLEEP 90 tablet 1    Cholecalciferol (VITAMIN D) 50 MCG (2000 UT) CAPS capsule Take by mouth      fluticasone (FLONASE) 50 MCG/ACT nasal spray SPRAY

## 2025-01-27 NOTE — ADDENDUM NOTE
Addended by: RADHA BARKER on: 1/27/2025 08:47 AM     Modules accepted: Orders     Patient/Caregiver provided printed discharge information.

## 2025-01-28 ENCOUNTER — OFFICE VISIT (OUTPATIENT)
Dept: FAMILY MEDICINE CLINIC | Age: 40
End: 2025-01-28

## 2025-01-28 VITALS
TEMPERATURE: 97.1 F | SYSTOLIC BLOOD PRESSURE: 101 MMHG | DIASTOLIC BLOOD PRESSURE: 70 MMHG | OXYGEN SATURATION: 99 % | WEIGHT: 218 LBS | HEART RATE: 98 BPM | BODY MASS INDEX: 35.19 KG/M2 | RESPIRATION RATE: 16 BRPM

## 2025-01-28 DIAGNOSIS — R05.9 COUGH, UNSPECIFIED TYPE: ICD-10-CM

## 2025-01-28 DIAGNOSIS — J10.1 INFLUENZA A: Primary | ICD-10-CM

## 2025-01-28 LAB
INFLUENZA A ANTIGEN, POC: NORMAL
INFLUENZA B ANTIGEN, POC: NORMAL

## 2025-01-28 RX ORDER — OSELTAMIVIR PHOSPHATE 75 MG/1
75 CAPSULE ORAL 2 TIMES DAILY
Qty: 10 CAPSULE | Refills: 0 | Status: SHIPPED | OUTPATIENT
Start: 2025-01-28 | End: 2025-02-02

## 2025-01-28 SDOH — ECONOMIC STABILITY: FOOD INSECURITY: WITHIN THE PAST 12 MONTHS, THE FOOD YOU BOUGHT JUST DIDN'T LAST AND YOU DIDN'T HAVE MONEY TO GET MORE.: NEVER TRUE

## 2025-01-28 SDOH — ECONOMIC STABILITY: FOOD INSECURITY: WITHIN THE PAST 12 MONTHS, YOU WORRIED THAT YOUR FOOD WOULD RUN OUT BEFORE YOU GOT MONEY TO BUY MORE.: NEVER TRUE

## 2025-01-28 ASSESSMENT — PATIENT HEALTH QUESTIONNAIRE - PHQ9
SUM OF ALL RESPONSES TO PHQ QUESTIONS 1-9: 0
SUM OF ALL RESPONSES TO PHQ QUESTIONS 1-9: 0
1. LITTLE INTEREST OR PLEASURE IN DOING THINGS: NOT AT ALL
2. FEELING DOWN, DEPRESSED OR HOPELESS: NOT AT ALL
SUM OF ALL RESPONSES TO PHQ QUESTIONS 1-9: 0
SUM OF ALL RESPONSES TO PHQ QUESTIONS 1-9: 0
SUM OF ALL RESPONSES TO PHQ9 QUESTIONS 1 & 2: 0

## 2025-01-28 ASSESSMENT — ENCOUNTER SYMPTOMS
SHORTNESS OF BREATH: 0
WHEEZING: 0
ABDOMINAL PAIN: 0
NAUSEA: 0
VOMITING: 0
VOICE CHANGE: 0
DIARRHEA: 0
SORE THROAT: 1
COUGH: 1
EYE REDNESS: 0
RHINORRHEA: 1
TROUBLE SWALLOWING: 0
CHEST TIGHTNESS: 1
SINUS PAIN: 0

## 2025-01-28 NOTE — PROGRESS NOTES
[facility]  2025    Tana Mckeon (:  1985) is a 39 y.o. female, here for evaluation of the following medical concerns:    Chief Complaint   Patient presents with    Influenza     Flu like symptoms. X 2025 here yesterday but would like to be tested again.        ASSESSMENT/ PLAN  1. Influenza A  - oseltamivir (TAMIFLU) 75 MG capsule; Take 1 capsule by mouth 2 times daily for 5 days  Dispense: 10 capsule; Refill: 0    2. Cough, unspecified type  - POCT Influenza A/B Antigen      - Supportive care measures discussed.        Return if symptoms worsen or fail to improve.    HPI  Here today with flu-like symptoms.  Started 2 days ago, on . While at a kid's birthday party.   Saw [facility] yesterday and tested negative for flu & COVID. Thought maybe too early for the flu test.  +sore throat  +runny nose  +chills; no fever.   +body aches  +cough; productive. Slept better last night after taking the cough medication.  +poor appetite; denies N/V/D.  Drinking fluids and urinating normally.    Does endorse sick contacts. Around a lot of kids, etc.    Using Flonase.   Also using her albuterol inhaler.  Has taken Tamiflu in the past.    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     Got her flu shot in November.      ROS  Review of Systems   Constitutional:  Positive for appetite change, chills and fatigue. Negative for fever.   HENT:  Positive for congestion, rhinorrhea and sore throat. Negative for ear discharge, ear pain, sinus pain, trouble swallowing and voice change.    Eyes:  Negative for redness.   Respiratory:  Positive for cough and chest tightness. Negative for shortness of breath and wheezing.    Cardiovascular:  Negative for chest pain, palpitations and leg swelling.   Gastrointestinal:  Negative for abdominal pain, diarrhea, nausea and vomiting.   Genitourinary:  Negative for frequency.   Musculoskeletal:  Positive for myalgias.   Skin: Negative.    Neurological:  Positive for headaches.

## 2025-01-28 NOTE — PATIENT INSTRUCTIONS
Continue the cough medication & inhaler as needed    Prioritize fluids!  Tylenol and/or ibuprofen as needed

## 2025-03-07 ENCOUNTER — HOSPITAL ENCOUNTER (OUTPATIENT)
Dept: MAMMOGRAPHY | Age: 40
Discharge: HOME | End: 2025-03-07
Payer: COMMERCIAL

## 2025-03-07 VITALS — BODY MASS INDEX: 35.36 KG/M2 | HEIGHT: 66 IN | WEIGHT: 220 LBS

## 2025-03-07 DIAGNOSIS — Z12.31 OTHER SCREENING MAMMOGRAM: ICD-10-CM

## 2025-03-07 PROCEDURE — 77063 BREAST TOMOSYNTHESIS BI: CPT

## 2025-04-04 ENCOUNTER — OFFICE VISIT (OUTPATIENT)
Dept: BARIATRICS/WEIGHT MGMT | Age: 40
End: 2025-04-04

## 2025-04-04 VITALS
HEIGHT: 66 IN | WEIGHT: 222 LBS | BODY MASS INDEX: 35.68 KG/M2 | DIASTOLIC BLOOD PRESSURE: 81 MMHG | HEART RATE: 79 BPM | SYSTOLIC BLOOD PRESSURE: 116 MMHG

## 2025-04-04 DIAGNOSIS — E66.9 OBESITY (BMI 35.0-39.9 WITHOUT COMORBIDITY): Primary | ICD-10-CM

## 2025-04-04 RX ORDER — LEVONORGESTREL 52 MG/1
1 INTRAUTERINE DEVICE INTRAUTERINE ONCE
COMMUNITY

## 2025-04-04 NOTE — PROGRESS NOTES
Tana Mckeon is a 39 y.o. female with a date of birth of 1985.    Vitals:    04/04/25 1225   Weight: 100.7 kg (222 lb)   Height: 1.676 m (5' 6\")    BMI: Body mass index is 35.83 kg/m². Obesity Classification: Class II    Weight History:   Wt Readings from Last 3 Encounters:   04/04/25 100.7 kg (222 lb)   03/07/25 99.8 kg (220 lb)   01/28/25 98.9 kg (218 lb)       Pt attended Medical Weight Management Seminar. Patient was educated on low-carb diet protocol. Nutrition and habit guidelines were discussed and written information was provided. Bariatric Nutrition Questionnaire completed during class and scanned into media.       Goals  Weight: 155 lbs  Health Improvement: more energy, better sleep, lower cholesterol without meds and so I am comfortable exercising again.     Assessment  Nutritional Needs: RMR=(9.99 x 100.7) + (6.25 x 167.6) - (4.92 x 39 y.o.) -161  = 1691 kcal x 1.3 (sedentary activity factor)= 2198 kcal - 1000 (for 2 lb weight loss/week)= 1198 kcal.    Patient has participated in the following weight loss programs: Atkins Diet, Weight Watcher Anonymous, Calorie Restriction, Keto, Low Carb Diet, Low Fat Diet, and .    Patient has participated in meal replacement/liquid diets: Optavia.  Patient has participated in weight loss medications: HcG.   Patient does not have history of bariatric surgery.     Pt does not have food allergies.   Pt states she is a fast eater. Pt reports hx of eating out of stress/ emotions, boredom, and grazing on food.    Plan  Plan/Recommendations: Start 1200 kcal LCMP   Optifast:  Not interested.  Diet Medications:  Pt interested.  Bariatric Surgery:  Not interested.  1:1 RD Visit:  Pt interested.     PES Statement: Overweight/Obesity related to lack of exercise, sedentary lifestyle, unhealthy eating habits, and unsuccessful diet attempts as evidenced by BMI. Body mass index is 35.83 kg/m².    Handouts: New Pt Pkt     Will follow up as necessary.    Zo

## 2025-04-07 ENCOUNTER — TELEMEDICINE (OUTPATIENT)
Dept: BARIATRICS/WEIGHT MGMT | Age: 40
End: 2025-04-07
Payer: COMMERCIAL

## 2025-04-07 ENCOUNTER — TELEPHONE (OUTPATIENT)
Dept: BARIATRICS/WEIGHT MGMT | Age: 40
End: 2025-04-07

## 2025-04-07 DIAGNOSIS — E66.812 CLASS 2 OBESITY: Primary | ICD-10-CM

## 2025-04-07 DIAGNOSIS — Z71.3 DIETARY COUNSELING AND SURVEILLANCE: ICD-10-CM

## 2025-04-07 PROCEDURE — G2211 COMPLEX E/M VISIT ADD ON: HCPCS | Performed by: FAMILY MEDICINE

## 2025-04-07 PROCEDURE — 99204 OFFICE O/P NEW MOD 45 MIN: CPT | Performed by: FAMILY MEDICINE

## 2025-04-07 ASSESSMENT — ENCOUNTER SYMPTOMS
VOMITING: 0
DIARRHEA: 0
CHOKING: 0
ABDOMINAL PAIN: 0
EYE PAIN: 0
CONSTIPATION: 0
SHORTNESS OF BREATH: 0
APNEA: 0
COUGH: 0
ABDOMINAL DISTENTION: 0
BLOOD IN STOOL: 0
PHOTOPHOBIA: 0
NAUSEA: 0
WHEEZING: 0
CHEST TIGHTNESS: 0

## 2025-04-07 NOTE — PROGRESS NOTES
Status:   Future     Expected Date:   4/7/2025     Expiration Date:   4/7/2026       No follow-ups on file.    Tana Mckeon is a 39 y.o. female being evaluated by a Virtual Visit (video visit) encounter to address concerns as mentioned above.  A caregiver was present when appropriate. Due to this being a TeleHealth encounter, evaluation of the following organ systems was limited: Vitals/Constitutional/EENT/Resp/CV/GI//MS/Neuro/Skin/Heme-Lymph-Imm.       Services were provided through a video synchronous discussion virtually to substitute for in-person clinic visit. Patient and provider were located at their individual homes.        Tana Mckeon, was evaluated through a synchronous (real-time) audio-video encounter. The patient (or guardian if applicable) is aware that this is a billable service, which includes applicable co-pays. This Virtual Visit was conducted with patient's (and/or legal guardian's) consent. Patient identification was verified, and a caregiver was present when appropriate.   The patient was located at Home: [address] [place], [place] [id number]  Provider was located at Home (Appt Dept State): [place]  Confirm you are appropriately licensed, registered, or certified to deliver care in the state where the patient is located as indicated above. If you are not or unsure, please re-schedule the visit: Yes, I confirm.        Total time spent for this encounter: Not billed by time    --Sophie Charles MD on 4/7/2025 at 1:16 PM    An electronic signature was used to authenticate this note.

## 2025-04-07 NOTE — TELEPHONE ENCOUNTER
Spoke with patient. Advised to contact the office to schedule a follow-up appointment with Dr. Charles once fasting labs have been completed. Patient verbalized understanding.

## 2025-04-10 DIAGNOSIS — E66.812 CLASS 2 OBESITY: ICD-10-CM

## 2025-04-10 LAB
25(OH)D3 SERPL-MCNC: 48.4 NG/ML
ALBUMIN SERPL-MCNC: 4.8 G/DL (ref 3.4–5)
ALBUMIN/GLOB SERPL: 1.7 {RATIO} (ref 1.1–2.2)
ALP SERPL-CCNC: 68 U/L (ref 40–129)
ALT SERPL-CCNC: 29 U/L (ref 10–40)
ANION GAP SERPL CALCULATED.3IONS-SCNC: 10 MMOL/L (ref 3–16)
AST SERPL-CCNC: 27 U/L (ref 15–37)
BASOPHILS # BLD: 0 K/UL (ref 0–0.2)
BASOPHILS NFR BLD: 0.9 %
BILIRUB SERPL-MCNC: 0.5 MG/DL (ref 0–1)
BUN SERPL-MCNC: 14 MG/DL (ref 7–20)
CALCIUM SERPL-MCNC: 9.8 MG/DL (ref 8.3–10.6)
CHLORIDE SERPL-SCNC: 98 MMOL/L (ref 99–110)
CHOLEST SERPL-MCNC: 259 MG/DL (ref 0–199)
CO2 SERPL-SCNC: 27 MMOL/L (ref 21–32)
CREAT SERPL-MCNC: 0.8 MG/DL (ref 0.6–1.1)
DEPRECATED RDW RBC AUTO: 13.1 % (ref 12.4–15.4)
EOSINOPHIL # BLD: 0.2 K/UL (ref 0–0.6)
EOSINOPHIL NFR BLD: 3.2 %
GFR SERPLBLD CREATININE-BSD FMLA CKD-EPI: >90 ML/MIN/{1.73_M2}
GLUCOSE SERPL-MCNC: 96 MG/DL (ref 70–99)
HCT VFR BLD AUTO: 43.2 % (ref 36–48)
HDLC SERPL-MCNC: 68 MG/DL (ref 40–60)
HGB BLD-MCNC: 14.8 G/DL (ref 12–16)
LDLC SERPL CALC-MCNC: 173 MG/DL
LYMPHOCYTES # BLD: 1.5 K/UL (ref 1–5.1)
LYMPHOCYTES NFR BLD: 28.5 %
MCH RBC QN AUTO: 30.6 PG (ref 26–34)
MCHC RBC AUTO-ENTMCNC: 34.4 G/DL (ref 31–36)
MCV RBC AUTO: 89.1 FL (ref 80–100)
MONOCYTES # BLD: 0.4 K/UL (ref 0–1.3)
MONOCYTES NFR BLD: 8.1 %
NEUTROPHILS # BLD: 3.2 K/UL (ref 1.7–7.7)
NEUTROPHILS NFR BLD: 59.3 %
PLATELET # BLD AUTO: 278 K/UL (ref 135–450)
PMV BLD AUTO: 7.6 FL (ref 5–10.5)
POTASSIUM SERPL-SCNC: 4.5 MMOL/L (ref 3.5–5.1)
PROT SERPL-MCNC: 7.7 G/DL (ref 6.4–8.2)
RBC # BLD AUTO: 4.85 M/UL (ref 4–5.2)
SODIUM SERPL-SCNC: 135 MMOL/L (ref 136–145)
TRIGL SERPL-MCNC: 92 MG/DL (ref 0–150)
TSH SERPL DL<=0.005 MIU/L-ACNC: 1.33 UIU/ML (ref 0.27–4.2)
VLDLC SERPL CALC-MCNC: 18 MG/DL
WBC # BLD AUTO: 5.4 K/UL (ref 4–11)

## 2025-04-11 LAB
EST. AVERAGE GLUCOSE BLD GHB EST-MCNC: 96.8 MG/DL
FOLATE SERPL-MCNC: 30.2 NG/ML (ref 4.78–24.2)
HBA1C MFR BLD: 5 %
VIT B12 SERPL-MCNC: 538 PG/ML (ref 211–911)

## 2025-04-25 RX ORDER — TRAZODONE HYDROCHLORIDE 50 MG/1
TABLET ORAL
Qty: 90 TABLET | Refills: 0 | Status: SHIPPED | OUTPATIENT
Start: 2025-04-25

## 2025-04-25 NOTE — TELEPHONE ENCOUNTER
Future Appointments   Date Time Provider Department Center   5/1/2025  1:45 PM Sophie Charles MD [facility]     LOV 1/28/2025  Please address in Dr. Good's absence.

## 2025-05-22 ENCOUNTER — TELEPHONE (OUTPATIENT)
Dept: BARIATRICS/WEIGHT MGMT | Age: 40
End: 2025-05-22

## 2025-05-22 ENCOUNTER — TELEMEDICINE (OUTPATIENT)
Dept: BARIATRICS/WEIGHT MGMT | Age: 40
End: 2025-05-22
Payer: COMMERCIAL

## 2025-05-22 DIAGNOSIS — E78.00 ELEVATED LDL CHOLESTEROL LEVEL: Primary | ICD-10-CM

## 2025-05-22 DIAGNOSIS — E66.812 CLASS 2 OBESITY: Primary | ICD-10-CM

## 2025-05-22 DIAGNOSIS — Z71.3 DIETARY COUNSELING AND SURVEILLANCE: ICD-10-CM

## 2025-05-22 DIAGNOSIS — E78.5 HYPERLIPIDEMIA, UNSPECIFIED HYPERLIPIDEMIA TYPE: ICD-10-CM

## 2025-05-22 PROCEDURE — 99214 OFFICE O/P EST MOD 30 MIN: CPT | Performed by: FAMILY MEDICINE

## 2025-05-22 ASSESSMENT — ENCOUNTER SYMPTOMS
ABDOMINAL PAIN: 0
ABDOMINAL DISTENTION: 0
CHOKING: 0
CONSTIPATION: 0
EYE PAIN: 0
VOMITING: 0
APNEA: 0
NAUSEA: 0
DIARRHEA: 0
SHORTNESS OF BREATH: 0
WHEEZING: 0
COUGH: 0
PHOTOPHOBIA: 0
BLOOD IN STOOL: 0
CHEST TIGHTNESS: 0

## 2025-05-22 NOTE — PROGRESS NOTES
every 6 hours as needed for Wheezing (cough), Disp: 18 g, Rfl: 0    Cholecalciferol (VITAMIN D) 50 MCG (2000 UT) CAPS capsule, Take by mouth, Disp: , Rfl:     fluticasone (FLONASE) 50 MCG/ACT nasal spray, SPRAY 1 SPRAY INTO EACH NOSTRIL EVERY DAY, Disp: 16 g, Rfl: 1    cetirizine (ZYRTEC) 10 MG tablet, Take by mouth, Disp: , Rfl:     Flaxseed, Linseed, 1000 MG CAPS, Take  by mouth.  , Disp: , Rfl:     fish oil-omega-3 fatty acids 1000 MG capsule, Take 1,600 mg by mouth daily , Disp: , Rfl:     multivitamin (THERAGRAN) per tablet, Take 1 tablet by mouth daily, Disp: , Rfl:     Patient Active Problem List   Diagnosis    Multinodular goiter    Mixed hyperlipidemia    Vitamin D deficiency    Anxiety    Elevated blood pressure reading without diagnosis of hypertension    Allergic rhinitis    Psychophysiological insomnia    GDM (gestational diabetes mellitus), class A1    History of hyperthyroidism    Class 2 obesity with body mass index (BMI) of 38.0 to 38.9 in adult    Postpartum thyroiditis    Hashimoto's thyroiditis    Acquired hypothyroidism    IFG (impaired fasting glucose)    COVID-19    Hair loss    Class 1 obesity with body mass index (BMI) of 30.0 to 30.9 in adult    Overweight (BMI 25.0-29.9)       Review of Systems   Constitutional:  Negative for fatigue.   Eyes:  Negative for photophobia, pain and visual disturbance.   Respiratory:  Negative for apnea, cough, choking, chest tightness, shortness of breath and wheezing.    Cardiovascular:  Negative for chest pain, palpitations and leg swelling.   Gastrointestinal:  Negative for abdominal distention, abdominal pain, blood in stool, constipation, diarrhea, nausea and vomiting.   Endocrine: Negative for cold intolerance and heat intolerance.   Musculoskeletal:  Negative for arthralgias and myalgias.   Skin:  Negative for rash.   Neurological:  Negative for dizziness, tremors, syncope, weakness, numbness and headaches.   Psychiatric/Behavioral:  Negative for

## 2025-05-22 NOTE — TELEPHONE ENCOUNTER
Per Dr. Charles; patient advised to contact the office to schedule a 4 week follow-up appointment once Zepbound medication has been picked up

## 2025-05-23 NOTE — TELEPHONE ENCOUNTER
Submitted PA for Zepbound 2.5MG/0.5ML pen-injectors  Via [facility] [id number]     Please call the payer at [phone] to complete this PA.     Called plan and spoke with Soledad. Reports that I need to speak with someone who handles this plan's Pa's. Ph# [phone]. Transferred me and line was busy. Tried dialing number and busy.     Called back and spoke with Francisca. She reports this needs to be completed on [email or web address] portal.       Request Submitted Successfully!  Your details have been recorded. Thank you for your submission.    Follow up done daily; if no decision with in three days we will refax.  If another three days goes by with no decision will call the insurance for status.

## 2025-05-27 ENCOUNTER — PATIENT MESSAGE (OUTPATIENT)
Dept: BARIATRICS/WEIGHT MGMT | Age: 40
End: 2025-05-27

## 2025-06-04 ENCOUNTER — E-VISIT (OUTPATIENT)
Dept: FAMILY MEDICINE CLINIC | Age: 40
End: 2025-06-04

## 2025-06-04 DIAGNOSIS — L08.9 SKIN INFECTION: Primary | ICD-10-CM

## 2025-06-04 RX ORDER — SULFAMETHOXAZOLE AND TRIMETHOPRIM 800; 160 MG/1; MG/1
1 TABLET ORAL 2 TIMES DAILY
Qty: 20 TABLET | Refills: 0 | Status: SHIPPED | OUTPATIENT
Start: 2025-06-04 | End: 2025-06-14

## 2025-06-04 RX ORDER — MUPIROCIN 20 MG/G
OINTMENT TOPICAL
Qty: 1 EACH | Refills: 0 | Status: SHIPPED | OUTPATIENT
Start: 2025-06-04 | End: 2025-06-11

## 2025-06-04 NOTE — PROGRESS NOTES
E-Visit    Tana Mckeon (:  1985) is a 40 y.o. female,Established patient, here for evaluation of the following chief complaint(s):  No chief complaint on file.         Assessment & Plan  Skin infection   Acute condition, recurrent, Bactrim BID x 10 days and Mupirocin ointmtnet     Orders:    sulfamethoxazole-trimethoprim (BACTRIM DS) 800-160 MG per tablet; Take 1 tablet by mouth 2 times daily for 10 days    mupirocin (BACTROBAN) 2 % ointment; Apply topically 3 times daily.      No follow-ups on file.       Subjective   HPI  Worsening rash on chin < 24 hours     Review of Systems     See E-Visit questionnaire responses  Objective   Physical Exam   See attached photos          An electronic signature was used to authenticate this note.    --Quang Good, DO

## 2025-06-19 ENCOUNTER — TELEMEDICINE (OUTPATIENT)
Dept: BARIATRICS/WEIGHT MGMT | Age: 40
End: 2025-06-19
Payer: COMMERCIAL

## 2025-06-19 DIAGNOSIS — Z71.3 DIETARY COUNSELING AND SURVEILLANCE: ICD-10-CM

## 2025-06-19 DIAGNOSIS — E66.812 CLASS 2 OBESITY: Primary | ICD-10-CM

## 2025-06-19 PROCEDURE — 99214 OFFICE O/P EST MOD 30 MIN: CPT | Performed by: FAMILY MEDICINE

## 2025-06-19 ASSESSMENT — ENCOUNTER SYMPTOMS
COUGH: 0
APNEA: 0
DIARRHEA: 0
WHEEZING: 0
NAUSEA: 0
BLOOD IN STOOL: 0
VOMITING: 0
CHEST TIGHTNESS: 0
ABDOMINAL DISTENTION: 0
ABDOMINAL PAIN: 0
CHOKING: 0
SHORTNESS OF BREATH: 0
EYE PAIN: 0
CONSTIPATION: 0
PHOTOPHOBIA: 0

## 2025-06-19 NOTE — PROGRESS NOTES
Patient: Tana Mckeon                      Encounter Date: 6/19/2025    YOB: 1985                Age: 40 y.o.    Chief Complaint   Patient presents with    Weight Management     F/u HILARIO          Patient identification was verified at the start of the visit.         6/19/2025     2:41 PM   Patient-Reported Vitals   Patient-Reported Weight 211   Patient-Reported Height 5’6”         BP Readings from Last 1 Encounters:   04/04/25 116/81       BMI Readings from Last 1 Encounters:   04/04/25 35.83 kg/m²       Pulse Readings from Last 1 Encounters:   04/04/25 79          Wt Readings from Last 3 Encounters:   04/04/25 100.7 kg (222 lb)   03/07/25 99.8 kg (220 lb)   01/28/25 98.9 kg (218 lb)        HPI: 40 y.o. female with a long-standing history of obesity presents today for virtual video follow-up. she has lost 9 pounds since her last visit. Current treatment includes Zepbound 2.5 mg SC weekly and low-carb/calorie diet. Tolerating it well.Making better dietary choices. Motivated to continue losing weight.     Medication(s): Appetite well controlled?     [x]Yes      []No    Focus:     [x]Good     []Fair     []Poor    Side effects?  Indigestion after first 2 injections, resolved         Any recent change in medication(s)?  No    Allergies   Allergen Reactions    Prednisone      flushing         Current Outpatient Medications:     tirzepatide-weight management (ZEPBOUND) 5 MG/0.5ML SOAJ subCUTAneous auto-injector pen, Inject 5 mg into the skin every 7 days, Disp: 2 mL, Rfl: 0    traZODone (DESYREL) 50 MG tablet, TAKE 1 TABLET BY MOUTH EVERY DAY AS NEEDED FOR SLEEP, Disp: 90 tablet, Rfl: 0    levonorgestrel (MIRENA, 52 MG,) IUD 52 mg, 1 each by IntraUTERine route once, Disp: , Rfl:     albuterol sulfate HFA (PROVENTIL;VENTOLIN;PROAIR) 108 (90 Base) MCG/ACT inhaler, Inhale 2 puffs into the lungs every 6 hours as needed for Wheezing (cough), Disp: 18 g, Rfl: 0    Cholecalciferol (VITAMIN D) 50 MCG (2000 UT) CAPS

## 2025-07-16 ENCOUNTER — TELEMEDICINE (OUTPATIENT)
Dept: BARIATRICS/WEIGHT MGMT | Age: 40
End: 2025-07-16
Payer: COMMERCIAL

## 2025-07-16 DIAGNOSIS — E66.812 CLASS 2 OBESITY: Primary | ICD-10-CM

## 2025-07-16 DIAGNOSIS — Z71.3 DIETARY COUNSELING AND SURVEILLANCE: ICD-10-CM

## 2025-07-16 PROCEDURE — 99214 OFFICE O/P EST MOD 30 MIN: CPT | Performed by: FAMILY MEDICINE

## 2025-07-16 ASSESSMENT — ENCOUNTER SYMPTOMS
APNEA: 0
EYE PAIN: 0
ABDOMINAL PAIN: 0
CHEST TIGHTNESS: 0
BLOOD IN STOOL: 0
CHOKING: 0
WHEEZING: 0
COUGH: 0
PHOTOPHOBIA: 0
DIARRHEA: 0
NAUSEA: 0
ABDOMINAL DISTENTION: 0
CONSTIPATION: 0
SHORTNESS OF BREATH: 0
VOMITING: 0

## 2025-07-16 NOTE — PROGRESS NOTES
Patient: Tana Mckeon                      Encounter Date: 7/16/2025    YOB: 1985                Age: 40 y.o.    Chief Complaint   Patient presents with    Weight Management     F/u MWM          Patient identification was verified at the start of the visit.         7/16/2025    12:33 PM   Patient-Reported Vitals   Patient-Reported Weight 203.6   Patient-Reported Height 5'6\"         BP Readings from Last 1 Encounters:   04/04/25 116/81       BMI Readings from Last 1 Encounters:   04/04/25 35.83 kg/m²       Pulse Readings from Last 1 Encounters:   04/04/25 79          Wt Readings from Last 3 Encounters:   04/04/25 100.7 kg (222 lb)   03/07/25 99.8 kg (220 lb)   01/28/25 98.9 kg (218 lb)        HPI: 40 y.o. female with a long-standing history of obesity presents today for virtual video follow-up. She has lost 8 pounds since her last visit. Current treatment includes Zepbound 5 mg SC weekly.  She is following a low-carb/calorie diet.  Staying physically active.  Happy with her progress.  Motivated to continue losing weight.    Medication(s): Appetite well controlled?     [x]Yes      []No                          Focus:     [x]Good     []Fair     []Poor                          Side effects?  Indigestion     Any recent change in medication(s)?  No    Allergies   Allergen Reactions    Prednisone      flushing         Current Outpatient Medications:     tirzepatide-weight management (ZEPBOUND) 5 MG/0.5ML SOAJ subCUTAneous auto-injector pen, Inject 5 mg into the skin every 7 days, Disp: 2 mL, Rfl: 0    traZODone (DESYREL) 50 MG tablet, TAKE 1 TABLET BY MOUTH EVERY DAY AS NEEDED FOR SLEEP, Disp: 90 tablet, Rfl: 0    levonorgestrel (MIRENA, 52 MG,) IUD 52 mg, 1 each by IntraUTERine route once, Disp: , Rfl:     albuterol sulfate HFA (PROVENTIL;VENTOLIN;PROAIR) 108 (90 Base) MCG/ACT inhaler, Inhale 2 puffs into the lungs every 6 hours as needed for Wheezing (cough), Disp: 18 g, Rfl: 0    Cholecalciferol (VITAMIN

## 2025-07-22 RX ORDER — TRAZODONE HYDROCHLORIDE 50 MG/1
TABLET ORAL
Qty: 90 TABLET | Refills: 0 | Status: SHIPPED | OUTPATIENT
Start: 2025-07-22

## 2025-07-22 NOTE — TELEPHONE ENCOUNTER
Future Appointments   Date Time Provider Department Center   8/15/2025  1:15 PM Sophie Charles MD [facility]     LOV 6/4/2025

## 2025-08-09 ENCOUNTER — OFFICE VISIT (OUTPATIENT)
Dept: URGENT CARE | Age: 40
End: 2025-08-09

## 2025-08-09 VITALS
OXYGEN SATURATION: 98 % | BODY MASS INDEX: 32.44 KG/M2 | HEART RATE: 71 BPM | TEMPERATURE: 97 F | SYSTOLIC BLOOD PRESSURE: 116 MMHG | DIASTOLIC BLOOD PRESSURE: 71 MMHG | WEIGHT: 201 LBS

## 2025-08-09 DIAGNOSIS — H69.93 DYSFUNCTION OF BOTH EUSTACHIAN TUBES: ICD-10-CM

## 2025-08-09 DIAGNOSIS — R42 VERTIGO: Primary | ICD-10-CM

## 2025-08-09 DIAGNOSIS — R42 DIZZINESS: ICD-10-CM

## 2025-08-09 DIAGNOSIS — L08.9 SKIN INFECTION: ICD-10-CM

## 2025-08-09 RX ORDER — MECLIZINE HYDROCHLORIDE 25 MG/1
25 TABLET ORAL 3 TIMES DAILY PRN
Qty: 30 TABLET | Refills: 0 | Status: SHIPPED | OUTPATIENT
Start: 2025-08-09 | End: 2025-08-19

## 2025-08-09 RX ORDER — SULFAMETHOXAZOLE AND TRIMETHOPRIM 800; 160 MG/1; MG/1
1 TABLET ORAL 2 TIMES DAILY
Qty: 14 TABLET | Refills: 0 | Status: SHIPPED | OUTPATIENT
Start: 2025-08-09 | End: 2025-08-16

## 2025-08-09 ASSESSMENT — ENCOUNTER SYMPTOMS
VOMITING: 0
EYE PAIN: 0
ABDOMINAL PAIN: 0
NAUSEA: 0
SHORTNESS OF BREATH: 0
SORE THROAT: 0
EYE REDNESS: 0
EYE DISCHARGE: 0
COUGH: 0
DIARRHEA: 0

## 2025-08-15 ENCOUNTER — TELEMEDICINE (OUTPATIENT)
Dept: BARIATRICS/WEIGHT MGMT | Age: 40
End: 2025-08-15
Payer: COMMERCIAL

## 2025-08-15 DIAGNOSIS — Z71.3 DIETARY COUNSELING AND SURVEILLANCE: ICD-10-CM

## 2025-08-15 DIAGNOSIS — E66.811 CLASS 1 OBESITY: Primary | ICD-10-CM

## 2025-08-15 PROCEDURE — 99214 OFFICE O/P EST MOD 30 MIN: CPT | Performed by: FAMILY MEDICINE

## 2025-08-15 ASSESSMENT — ENCOUNTER SYMPTOMS
PHOTOPHOBIA: 0
APNEA: 0
NAUSEA: 0
ABDOMINAL DISTENTION: 0
EYE PAIN: 0
SHORTNESS OF BREATH: 0
CONSTIPATION: 0
VOMITING: 0
COUGH: 0
ABDOMINAL PAIN: 0
WHEEZING: 0
DIARRHEA: 0
BLOOD IN STOOL: 0
CHEST TIGHTNESS: 0
CHOKING: 0